# Patient Record
Sex: FEMALE | Race: BLACK OR AFRICAN AMERICAN | Employment: OTHER | URBAN - METROPOLITAN AREA
[De-identification: names, ages, dates, MRNs, and addresses within clinical notes are randomized per-mention and may not be internally consistent; named-entity substitution may affect disease eponyms.]

---

## 2018-09-06 ENCOUNTER — HOSPITAL ENCOUNTER (EMERGENCY)
Age: 68
Discharge: HOME | End: 2018-09-06
Attending: EMERGENCY MEDICINE
Payer: MEDICARE

## 2018-09-06 ENCOUNTER — APPOINTMENT (OUTPATIENT)
Dept: CT IMAGING | Age: 68
End: 2018-09-06
Payer: MEDICARE

## 2018-09-06 VITALS
BODY MASS INDEX: 30.3 KG/M2 | WEIGHT: 171 LBS | DIASTOLIC BLOOD PRESSURE: 92 MMHG | TEMPERATURE: 98.2 F | SYSTOLIC BLOOD PRESSURE: 195 MMHG | HEIGHT: 63 IN | HEART RATE: 104 BPM | OXYGEN SATURATION: 97 % | RESPIRATION RATE: 16 BRPM

## 2018-09-06 DIAGNOSIS — K52.9 GASTROENTERITIS: Primary | ICD-10-CM

## 2018-09-06 LAB
ALBUMIN SERPL-MCNC: 3.8 G/DL (ref 3.5–5.2)
ALP BLD-CCNC: 144 U/L (ref 35–104)
ALT SERPL-CCNC: 29 U/L (ref 0–32)
ANION GAP SERPL CALCULATED.3IONS-SCNC: 13 MMOL/L (ref 7–16)
AST SERPL-CCNC: 74 U/L (ref 0–31)
BASOPHILS ABSOLUTE: 0.02 E9/L (ref 0–0.2)
BASOPHILS RELATIVE PERCENT: 0.1 % (ref 0–2)
BILIRUB SERPL-MCNC: 1.1 MG/DL (ref 0–1.2)
BUN BLDV-MCNC: 12 MG/DL (ref 8–23)
CALCIUM SERPL-MCNC: 8.9 MG/DL (ref 8.6–10.2)
CHLORIDE BLD-SCNC: 96 MMOL/L (ref 98–107)
CO2: 27 MMOL/L (ref 22–29)
CREAT SERPL-MCNC: 0.7 MG/DL (ref 0.5–1)
EOSINOPHILS ABSOLUTE: 0.09 E9/L (ref 0.05–0.5)
EOSINOPHILS RELATIVE PERCENT: 0.6 % (ref 0–6)
GFR AFRICAN AMERICAN: >60
GFR NON-AFRICAN AMERICAN: >60 ML/MIN/1.73
GLUCOSE BLD-MCNC: 240 MG/DL (ref 74–109)
HCT VFR BLD CALC: 34.9 % (ref 34–48)
HEMOGLOBIN: 10.6 G/DL (ref 11.5–15.5)
IMMATURE GRANULOCYTES #: 0.06 E9/L
IMMATURE GRANULOCYTES %: 0.4 % (ref 0–5)
LACTIC ACID: 1.5 MMOL/L (ref 0.5–2.2)
LIPASE: 22 U/L (ref 13–60)
LYMPHOCYTES ABSOLUTE: 0.71 E9/L (ref 1.5–4)
LYMPHOCYTES RELATIVE PERCENT: 4.7 % (ref 20–42)
MCH RBC QN AUTO: 20.9 PG (ref 26–35)
MCHC RBC AUTO-ENTMCNC: 30.4 % (ref 32–34.5)
MCV RBC AUTO: 68.7 FL (ref 80–99.9)
MONOCYTES ABSOLUTE: 0.64 E9/L (ref 0.1–0.95)
MONOCYTES RELATIVE PERCENT: 4.3 % (ref 2–12)
NEUTROPHILS ABSOLUTE: 13.51 E9/L (ref 1.8–7.3)
NEUTROPHILS RELATIVE PERCENT: 89.9 % (ref 43–80)
PDW BLD-RTO: 19.7 FL (ref 11.5–15)
PLATELET # BLD: 239 E9/L (ref 130–450)
PMV BLD AUTO: 10.8 FL (ref 7–12)
POTASSIUM SERPL-SCNC: 3.9 MMOL/L (ref 3.5–5)
RBC # BLD: 5.08 E12/L (ref 3.5–5.5)
SODIUM BLD-SCNC: 136 MMOL/L (ref 132–146)
TOTAL PROTEIN: 7.6 G/DL (ref 6.4–8.3)
WBC # BLD: 15 E9/L (ref 4.5–11.5)

## 2018-09-06 PROCEDURE — 83690 ASSAY OF LIPASE: CPT

## 2018-09-06 PROCEDURE — 2580000003 HC RX 258: Performed by: EMERGENCY MEDICINE

## 2018-09-06 PROCEDURE — 74177 CT ABD & PELVIS W/CONTRAST: CPT

## 2018-09-06 PROCEDURE — 80053 COMPREHEN METABOLIC PANEL: CPT

## 2018-09-06 PROCEDURE — 85025 COMPLETE CBC W/AUTO DIFF WBC: CPT

## 2018-09-06 PROCEDURE — 99284 EMERGENCY DEPT VISIT MOD MDM: CPT

## 2018-09-06 PROCEDURE — 96374 THER/PROPH/DIAG INJ IV PUSH: CPT

## 2018-09-06 PROCEDURE — 83605 ASSAY OF LACTIC ACID: CPT

## 2018-09-06 PROCEDURE — 6360000004 HC RX CONTRAST MEDICATION: Performed by: RADIOLOGY

## 2018-09-06 PROCEDURE — 6360000002 HC RX W HCPCS: Performed by: EMERGENCY MEDICINE

## 2018-09-06 PROCEDURE — 36415 COLL VENOUS BLD VENIPUNCTURE: CPT

## 2018-09-06 RX ORDER — ONDANSETRON 2 MG/ML
4 INJECTION INTRAMUSCULAR; INTRAVENOUS ONCE
Status: COMPLETED | OUTPATIENT
Start: 2018-09-06 | End: 2018-09-06

## 2018-09-06 RX ORDER — 0.9 % SODIUM CHLORIDE 0.9 %
1000 INTRAVENOUS SOLUTION INTRAVENOUS ONCE
Status: COMPLETED | OUTPATIENT
Start: 2018-09-06 | End: 2018-09-06

## 2018-09-06 RX ORDER — PROMETHAZINE HYDROCHLORIDE 12.5 MG/1
12.5 TABLET ORAL EVERY 6 HOURS PRN
Qty: 15 TABLET | Refills: 0 | Status: SHIPPED | OUTPATIENT
Start: 2018-09-06 | End: 2018-09-13

## 2018-09-06 RX ADMIN — SODIUM CHLORIDE 1000 ML: 9 INJECTION, SOLUTION INTRAVENOUS at 18:30

## 2018-09-06 RX ADMIN — IOPAMIDOL 110 ML: 755 INJECTION, SOLUTION INTRAVENOUS at 20:05

## 2018-09-06 RX ADMIN — SODIUM CHLORIDE 1000 ML: 9 INJECTION, SOLUTION INTRAVENOUS at 18:55

## 2018-09-06 RX ADMIN — ONDANSETRON HYDROCHLORIDE 4 MG: 2 INJECTION, SOLUTION INTRAMUSCULAR; INTRAVENOUS at 18:55

## 2018-09-06 ASSESSMENT — PAIN DESCRIPTION - DESCRIPTORS: DESCRIPTORS: SORE

## 2018-09-06 ASSESSMENT — PAIN DESCRIPTION - FREQUENCY: FREQUENCY: CONTINUOUS

## 2018-09-06 ASSESSMENT — PAIN DESCRIPTION - ONSET: ONSET: SUDDEN

## 2018-09-06 ASSESSMENT — PAIN DESCRIPTION - LOCATION: LOCATION: ABDOMEN

## 2018-09-06 ASSESSMENT — PAIN SCALES - GENERAL: PAINLEVEL_OUTOF10: 7

## 2018-09-06 ASSESSMENT — PAIN DESCRIPTION - PAIN TYPE: TYPE: ACUTE PAIN

## 2018-09-06 NOTE — ED PROVIDER NOTES
Department of Emergency Medicine   ED  Provider Note  Admit Date/RoomTime: 9/6/2018  5:56 PM  ED Room: 04/04    HPI:   Dhruv Valladares is a 77 y.o. female presenting to the ED for abd pain, beginning at 7:00 AM today. The complaint has been constant, moderate in severity, and worsened by nothing. Pt also c/o nausea and emesis. She states she had diarrhea 2 days ago but it has since resolved. She has had 7 episodes of emesis throughout the day. She has taken imodium. She denies HA, chest pain, SOB, fever, chills, diarrhea, dysuria, hematuria, hematochezia, and melena. ROS:   Pertinent positives and negatives are stated within HPI, all other systems reviewed and are negative.    --------------------------------------------- PAST HISTORY ---------------------------------------------  Past Medical History:  has a past medical history of Diabetes mellitus ([facility] [address] Diverticular disease; Fibromyalgia muscle pain; and Hypertension. Past Surgical History:  has a past surgical history that includes Hysterectomy. Social History:  reports that she has never smoked. She has never used smokeless tobacco. She reports that she does not drink alcohol or use drugs. Family History: family history is not on file. The patients home medications have been reviewed. Allergies: Patient has no known allergies.     -------------------------------------------------- RESULTS -------------------------------------------------  All laboratory and radiology results have been personally reviewed by myself   LABS:  Results for orders placed or performed during the hospital encounter of 09/06/18   CBC Auto Differential   Result Value Ref Range    WBC 15.0 (H) 4.5 - 11.5 E9/L    RBC 5.08 3.50 - 5.50 E12/L    Hemoglobin 10.6 (L) 11.5 - 15.5 g/dL    Hematocrit 34.9 34.0 - 48.0 %    MCV 68.7 (L) 80.0 - 99.9 fL    MCH 20.9 (L) 26.0 - 35.0 pg    MCHC 30.4 (L) 32.0 - 34.5 %    RDW 19.7 (H) 11.5 - 15.0 fL    Platelets 174 291 - 749 E9/L    MPV 10.8 7.0 - 12.0 fL    Neutrophils % 89.9 (H) 43.0 - 80.0 %    Immature Granulocytes % 0.4 0.0 - 5.0 %    Lymphocytes % 4.7 (L) 20.0 - 42.0 %    Monocytes % 4.3 2.0 - 12.0 %    Eosinophils % 0.6 0.0 - 6.0 %    Basophils % 0.1 0.0 - 2.0 %    Neutrophils # 13.51 (H) 1.80 - 7.30 E9/L    Immature Granulocytes # 0.06 E9/L    Lymphocytes # 0.71 (L) 1.50 - 4.00 E9/L    Monocytes # 0.64 0.10 - 0.95 E9/L    Eosinophils # 0.09 0.05 - 0.50 E9/L    Basophils # 0.02 0.00 - 0.20 E9/L   Comprehensive Metabolic Panel   Result Value Ref Range    Sodium 136 132 - 146 mmol/L    Potassium 3.9 3.5 - 5.0 mmol/L    Chloride 96 (L) 98 - 107 mmol/L    CO2 27 22 - 29 mmol/L    Anion Gap 13 7 - 16 mmol/L    Glucose 240 (H) 74 - 109 mg/dL    BUN 12 8 - 23 mg/dL    CREATININE 0.7 0.5 - 1.0 mg/dL    GFR Non-African American >60 >=60 mL/min/1.73    GFR African American >60     Calcium 8.9 8.6 - 10.2 mg/dL    Total Protein 7.6 6.4 - 8.3 g/dL    Alb 3.8 3.5 - 5.2 g/dL    Total Bilirubin 1.1 0.0 - 1.2 mg/dL    Alkaline Phosphatase 144 (H) 35 - 104 U/L    ALT 29 0 - 32 U/L    AST 74 (H) 0 - 31 U/L   Lactic Acid, Plasma   Result Value Ref Range    Lactic Acid 1.5 0.5 - 2.2 mmol/L   Lipase   Result Value Ref Range    Lipase 22 13 - 60 U/L       RADIOLOGY:  Interpreted by Radiologist.  CT ABDOMEN PELVIS W IV CONTRAST Additional Contrast? None   Final Result      1. Cholelithiasis. 2. Small hiatal hernia.                         ------------------------- NURSING NOTES AND VITALS REVIEWED ---------------------------   The nursing notes within the ED encounter and vital signs as below have been reviewed.    BP (!) 179/94   Pulse 104   Temp 99.5 °F (37.5 °C) (Oral)   Resp 16   Ht 5' 3\" (1.6 m)   Wt 171 lb (77.6 kg)   SpO2 98%   BMI 30.29 kg/m²   Oxygen Saturation Interpretation: Normal    ---------------------------------------------------PHYSICAL EXAM--------------------------------------    Constitutional/General: Alert and oriented x3, well appearing, non toxic in NAD. Afebrile. Head: NC/AT  Eyes: PERRL, EOMI  HENT: Oropharynx clear. Handling secretions. Neck: Supple, full ROM  Pulmonary: Lungs clear to auscultation bilaterally, no wheezes, rales, or rhonchi. Not in respiratory distress. Cardiovascular: Regular rate. Regular rhythm. No murmurs. No gallops, or rubs. 2+ distal pulses. Abdomen: Soft, non tender, non distended. No guarding, rebound, or rigidity. Extremities: Moves all extremities x 4. Warm and well perfused. No edema. Skin: Warm and dry without rash. Neurologic: GCS 15, no focal deficits, systemic strength 5/5 to all extremities symmetrically, CN II-XII grossly intact. Psych: Speech and behavior appropriate.       ------------------------------ ED COURSE/MEDICAL DECISION MAKING----------------------  Medications   0.9 % sodium chloride bolus (1,000 mLs Intravenous New Bag 9/6/18 1855)   0.9 % sodium chloride bolus (1,000 mLs Intravenous New Bag 9/6/18 1830)   ondansetron (ZOFRAN) injection 4 mg (4 mg Intravenous Given 9/6/18 1855)   iopamidol (ISOVUE-370) 76 % injection 110 mL (110 mLs Intravenous Given 9/6/18 2005)            Medical Decision Making:    Pt is a 77year old female presenting to the ED for abd pain, nausea, and emesis. She appears in NAD, her BP is slightly elevated. She will be given IV fluids. Labs ordered. CT scan of the abdomen was negative except for a symptomatic cholelithiasis  Counseling: The emergency provider has spoken with the patient and discussed todays results, in addition to providing specific details for the plan of care and counseling regarding the diagnosis and prognosis. Questions are answered at this time and they are agreeable with the plan.      --------------------------------- IMPRESSION AND DISPOSITION ---------------------------------    IMPRESSION  1.  Gastroenteritis        DISPOSITION  Disposition: Discharge to home  Patient condition is good    9/6/18, 6:20 PM.    This note is

## 2018-11-24 ENCOUNTER — HOSPITAL ENCOUNTER (EMERGENCY)
Age: 68
Discharge: HOME | End: 2018-11-24
Attending: EMERGENCY MEDICINE
Payer: MEDICARE

## 2018-11-24 ENCOUNTER — APPOINTMENT (OUTPATIENT)
Dept: GENERAL RADIOLOGY | Age: 68
End: 2018-11-24
Payer: MEDICARE

## 2018-11-24 VITALS
HEART RATE: 84 BPM | HEIGHT: 64 IN | TEMPERATURE: 98 F | WEIGHT: 174 LBS | DIASTOLIC BLOOD PRESSURE: 81 MMHG | SYSTOLIC BLOOD PRESSURE: 175 MMHG | OXYGEN SATURATION: 99 % | BODY MASS INDEX: 29.71 KG/M2 | RESPIRATION RATE: 16 BRPM

## 2018-11-24 DIAGNOSIS — R07.82 INTERCOSTAL PAIN: ICD-10-CM

## 2018-11-24 DIAGNOSIS — N64.4 BREAST PAIN: Primary | ICD-10-CM

## 2018-11-24 LAB
ALBUMIN SERPL-MCNC: 3.7 G/DL (ref 3.5–5.2)
ALP BLD-CCNC: 143 U/L (ref 35–104)
ALT SERPL-CCNC: 16 U/L (ref 0–32)
ANION GAP SERPL CALCULATED.3IONS-SCNC: 11 MMOL/L (ref 7–16)
AST SERPL-CCNC: 25 U/L (ref 0–31)
BACTERIA: ABNORMAL /HPF
BASOPHILS ABSOLUTE: 0.02 E9/L (ref 0–0.2)
BASOPHILS RELATIVE PERCENT: 0.3 % (ref 0–2)
BILIRUB SERPL-MCNC: 0.3 MG/DL (ref 0–1.2)
BILIRUBIN URINE: NEGATIVE
BLOOD, URINE: ABNORMAL
BUN BLDV-MCNC: 20 MG/DL (ref 8–23)
CALCIUM SERPL-MCNC: 8.6 MG/DL (ref 8.6–10.2)
CHLORIDE BLD-SCNC: 99 MMOL/L (ref 98–107)
CLARITY: CLEAR
CO2: 24 MMOL/L (ref 22–29)
COLOR: YELLOW
CREAT SERPL-MCNC: 0.8 MG/DL (ref 0.5–1)
EKG ATRIAL RATE: 92 BPM
EKG P AXIS: 46 DEGREES
EKG P-R INTERVAL: 150 MS
EKG Q-T INTERVAL: 380 MS
EKG QRS DURATION: 90 MS
EKG QTC CALCULATION (BAZETT): 469 MS
EKG R AXIS: 7 DEGREES
EKG T AXIS: 62 DEGREES
EKG VENTRICULAR RATE: 92 BPM
EOSINOPHILS ABSOLUTE: 0.29 E9/L (ref 0.05–0.5)
EOSINOPHILS RELATIVE PERCENT: 4.5 % (ref 0–6)
GFR AFRICAN AMERICAN: >60
GFR NON-AFRICAN AMERICAN: >60 ML/MIN/1.73
GLUCOSE BLD-MCNC: 328 MG/DL (ref 74–99)
GLUCOSE URINE: >=1000 MG/DL
HCT VFR BLD CALC: 33 % (ref 34–48)
HEMOGLOBIN: 10.5 G/DL (ref 11.5–15.5)
IMMATURE GRANULOCYTES #: 0.02 E9/L
IMMATURE GRANULOCYTES %: 0.3 % (ref 0–5)
KETONES, URINE: NEGATIVE MG/DL
LACTIC ACID: 1.4 MMOL/L (ref 0.5–2.2)
LEUKOCYTE ESTERASE, URINE: ABNORMAL
LIPASE: 36 U/L (ref 13–60)
LYMPHOCYTES ABSOLUTE: 1.91 E9/L (ref 1.5–4)
LYMPHOCYTES RELATIVE PERCENT: 29.4 % (ref 20–42)
MCH RBC QN AUTO: 23.2 PG (ref 26–35)
MCHC RBC AUTO-ENTMCNC: 31.8 % (ref 32–34.5)
MCV RBC AUTO: 72.8 FL (ref 80–99.9)
MONOCYTES ABSOLUTE: 0.5 E9/L (ref 0.1–0.95)
MONOCYTES RELATIVE PERCENT: 7.7 % (ref 2–12)
NEUTROPHILS ABSOLUTE: 3.76 E9/L (ref 1.8–7.3)
NEUTROPHILS RELATIVE PERCENT: 57.8 % (ref 43–80)
NITRITE, URINE: NEGATIVE
PDW BLD-RTO: 19.6 FL (ref 11.5–15)
PH UA: 6 (ref 5–9)
PLATELET # BLD: 209 E9/L (ref 130–450)
PMV BLD AUTO: 11.1 FL (ref 7–12)
POTASSIUM SERPL-SCNC: 4.6 MMOL/L (ref 3.5–5)
PRO-BNP: 79 PG/ML (ref 0–125)
PROTEIN UA: NEGATIVE MG/DL
RBC # BLD: 4.53 E12/L (ref 3.5–5.5)
RBC UA: ABNORMAL /HPF (ref 0–2)
SODIUM BLD-SCNC: 134 MMOL/L (ref 132–146)
SPECIFIC GRAVITY UA: 1.01 (ref 1–1.03)
TOTAL PROTEIN: 7.1 G/DL (ref 6.4–8.3)
TROPONIN: <0.01 NG/ML (ref 0–0.03)
UROBILINOGEN, URINE: 0.2 E.U./DL
WBC # BLD: 6.5 E9/L (ref 4.5–11.5)
WBC UA: ABNORMAL /HPF (ref 0–5)

## 2018-11-24 PROCEDURE — 83605 ASSAY OF LACTIC ACID: CPT

## 2018-11-24 PROCEDURE — 81001 URINALYSIS AUTO W/SCOPE: CPT

## 2018-11-24 PROCEDURE — 71045 X-RAY EXAM CHEST 1 VIEW: CPT

## 2018-11-24 PROCEDURE — 83880 ASSAY OF NATRIURETIC PEPTIDE: CPT

## 2018-11-24 PROCEDURE — 6360000002 HC RX W HCPCS: Performed by: EMERGENCY MEDICINE

## 2018-11-24 PROCEDURE — 83690 ASSAY OF LIPASE: CPT

## 2018-11-24 PROCEDURE — 96374 THER/PROPH/DIAG INJ IV PUSH: CPT

## 2018-11-24 PROCEDURE — 99285 EMERGENCY DEPT VISIT HI MDM: CPT

## 2018-11-24 PROCEDURE — 36415 COLL VENOUS BLD VENIPUNCTURE: CPT

## 2018-11-24 PROCEDURE — 96375 TX/PRO/DX INJ NEW DRUG ADDON: CPT

## 2018-11-24 PROCEDURE — 80053 COMPREHEN METABOLIC PANEL: CPT

## 2018-11-24 PROCEDURE — 84484 ASSAY OF TROPONIN QUANT: CPT

## 2018-11-24 PROCEDURE — 85025 COMPLETE CBC W/AUTO DIFF WBC: CPT

## 2018-11-24 RX ORDER — HYDRALAZINE HYDROCHLORIDE 20 MG/ML
10 INJECTION INTRAMUSCULAR; INTRAVENOUS ONCE
Status: COMPLETED | OUTPATIENT
Start: 2018-11-24 | End: 2018-11-24

## 2018-11-24 RX ORDER — NAPROXEN 500 MG/1
500 TABLET ORAL 2 TIMES DAILY PRN
Qty: 28 TABLET | Refills: 0 | Status: SHIPPED | OUTPATIENT
Start: 2018-11-24 | End: 2019-09-19

## 2018-11-24 RX ORDER — SODIUM CHLORIDE 0.9 % (FLUSH) 0.9 %
10 SYRINGE (ML) INJECTION PRN
Status: DISCONTINUED | OUTPATIENT
Start: 2018-11-24 | End: 2018-11-24 | Stop reason: HOSPADM

## 2018-11-24 RX ORDER — FENTANYL CITRATE 50 UG/ML
25 INJECTION, SOLUTION INTRAMUSCULAR; INTRAVENOUS ONCE
Status: COMPLETED | OUTPATIENT
Start: 2018-11-24 | End: 2018-11-24

## 2018-11-24 RX ADMIN — FENTANYL CITRATE 25 MCG: 50 INJECTION, SOLUTION INTRAMUSCULAR; INTRAVENOUS at 10:43

## 2018-11-24 RX ADMIN — HYDRALAZINE HYDROCHLORIDE 10 MG: 20 INJECTION INTRAMUSCULAR; INTRAVENOUS at 12:11

## 2018-11-24 ASSESSMENT — PAIN DESCRIPTION - DESCRIPTORS: DESCRIPTORS: SORE

## 2018-11-24 ASSESSMENT — ENCOUNTER SYMPTOMS
NAUSEA: 0
VOMITING: 0
BACK PAIN: 0
ABDOMINAL PAIN: 0
COUGH: 0
BLOOD IN STOOL: 0
SHORTNESS OF BREATH: 0

## 2018-11-24 ASSESSMENT — PAIN DESCRIPTION - ORIENTATION: ORIENTATION: MID

## 2018-11-24 ASSESSMENT — PAIN SCALES - GENERAL
PAINLEVEL_OUTOF10: 7
PAINLEVEL_OUTOF10: 3

## 2018-11-24 ASSESSMENT — PAIN DESCRIPTION - LOCATION
LOCATION: CHEST
LOCATION: CHEST

## 2018-11-24 ASSESSMENT — PAIN DESCRIPTION - PAIN TYPE: TYPE: ACUTE PAIN

## 2018-11-24 ASSESSMENT — PAIN DESCRIPTION - FREQUENCY: FREQUENCY: CONTINUOUS

## 2019-03-19 ENCOUNTER — APPOINTMENT (OUTPATIENT)
Dept: GENERAL RADIOLOGY | Age: 69
End: 2019-03-19
Payer: MEDICARE

## 2019-03-19 ENCOUNTER — HOSPITAL ENCOUNTER (EMERGENCY)
Age: 69
Discharge: HOME | End: 2019-03-19
Attending: EMERGENCY MEDICINE
Payer: MEDICARE

## 2019-03-19 VITALS
BODY MASS INDEX: 28.35 KG/M2 | WEIGHT: 160 LBS | RESPIRATION RATE: 20 BRPM | OXYGEN SATURATION: 94 % | TEMPERATURE: 99 F | HEIGHT: 63 IN | HEART RATE: 88 BPM | DIASTOLIC BLOOD PRESSURE: 88 MMHG | SYSTOLIC BLOOD PRESSURE: 148 MMHG

## 2019-03-19 DIAGNOSIS — J11.1 INFLUENZA WITH RESPIRATORY MANIFESTATION OTHER THAN PNEUMONIA: Primary | ICD-10-CM

## 2019-03-19 LAB
ANION GAP SERPL CALCULATED.3IONS-SCNC: 12 MMOL/L (ref 7–16)
ANISOCYTOSIS: ABNORMAL
BASOPHILS ABSOLUTE: 0.01 E9/L (ref 0–0.2)
BASOPHILS RELATIVE PERCENT: 0.1 % (ref 0–2)
BUN BLDV-MCNC: 18 MG/DL (ref 8–23)
BURR CELLS: ABNORMAL
CALCIUM SERPL-MCNC: 8.6 MG/DL (ref 8.6–10.2)
CHLORIDE BLD-SCNC: 94 MMOL/L (ref 98–107)
CO2: 27 MMOL/L (ref 22–29)
CREAT SERPL-MCNC: 1 MG/DL (ref 0.5–1)
EOSINOPHILS ABSOLUTE: 0.01 E9/L (ref 0.05–0.5)
EOSINOPHILS RELATIVE PERCENT: 0.1 % (ref 0–6)
GFR AFRICAN AMERICAN: >60
GFR NON-AFRICAN AMERICAN: >60 ML/MIN/1.73
GLUCOSE BLD-MCNC: 187 MG/DL (ref 74–99)
HCT VFR BLD CALC: 30.4 % (ref 34–48)
HEMOGLOBIN: 9 G/DL (ref 11.5–15.5)
HYPOCHROMIA: ABNORMAL
IMMATURE GRANULOCYTES #: 0.05 E9/L
IMMATURE GRANULOCYTES %: 0.5 % (ref 0–5)
INFLUENZA A BY PCR: DETECTED
INFLUENZA B BY PCR: NOT DETECTED
LACTIC ACID: 1.3 MMOL/L (ref 0.5–2.2)
LYMPHOCYTES ABSOLUTE: 1.07 E9/L (ref 1.5–4)
LYMPHOCYTES RELATIVE PERCENT: 11 % (ref 20–42)
MCH RBC QN AUTO: 18.2 PG (ref 26–35)
MCHC RBC AUTO-ENTMCNC: 29.6 % (ref 32–34.5)
MCV RBC AUTO: 61.4 FL (ref 80–99.9)
MONOCYTES ABSOLUTE: 0.98 E9/L (ref 0.1–0.95)
MONOCYTES RELATIVE PERCENT: 10.1 % (ref 2–12)
NEUTROPHILS ABSOLUTE: 7.6 E9/L (ref 1.8–7.3)
NEUTROPHILS RELATIVE PERCENT: 78.2 % (ref 43–80)
OVALOCYTES: ABNORMAL
PDW BLD-RTO: 21.1 FL (ref 11.5–15)
PLATELET # BLD: 255 E9/L (ref 130–450)
PMV BLD AUTO: ABNORMAL FL (ref 7–12)
POIKILOCYTES: ABNORMAL
POLYCHROMASIA: ABNORMAL
POTASSIUM SERPL-SCNC: 3.4 MMOL/L (ref 3.5–5)
RBC # BLD: 4.95 E12/L (ref 3.5–5.5)
SCHISTOCYTES: ABNORMAL
SODIUM BLD-SCNC: 133 MMOL/L (ref 132–146)
WBC # BLD: 9.7 E9/L (ref 4.5–11.5)

## 2019-03-19 PROCEDURE — 99283 EMERGENCY DEPT VISIT LOW MDM: CPT

## 2019-03-19 PROCEDURE — 6370000000 HC RX 637 (ALT 250 FOR IP): Performed by: EMERGENCY MEDICINE

## 2019-03-19 PROCEDURE — 80048 BASIC METABOLIC PNL TOTAL CA: CPT

## 2019-03-19 PROCEDURE — 71046 X-RAY EXAM CHEST 2 VIEWS: CPT

## 2019-03-19 PROCEDURE — 87040 BLOOD CULTURE FOR BACTERIA: CPT

## 2019-03-19 PROCEDURE — 85025 COMPLETE CBC W/AUTO DIFF WBC: CPT

## 2019-03-19 PROCEDURE — 83605 ASSAY OF LACTIC ACID: CPT

## 2019-03-19 PROCEDURE — 87502 INFLUENZA DNA AMP PROBE: CPT

## 2019-03-19 PROCEDURE — 36415 COLL VENOUS BLD VENIPUNCTURE: CPT

## 2019-03-19 PROCEDURE — 2580000003 HC RX 258: Performed by: EMERGENCY MEDICINE

## 2019-03-19 RX ORDER — BENZONATATE 100 MG/1
100 CAPSULE ORAL 3 TIMES DAILY PRN
Qty: 21 CAPSULE | Refills: 0 | Status: SHIPPED | OUTPATIENT
Start: 2019-03-19 | End: 2019-03-26

## 2019-03-19 RX ORDER — ACETAMINOPHEN 325 MG/1
650 TABLET ORAL ONCE
Status: COMPLETED | OUTPATIENT
Start: 2019-03-19 | End: 2019-03-19

## 2019-03-19 RX ORDER — 0.9 % SODIUM CHLORIDE 0.9 %
500 INTRAVENOUS SOLUTION INTRAVENOUS ONCE
Status: COMPLETED | OUTPATIENT
Start: 2019-03-19 | End: 2019-03-19

## 2019-03-19 RX ORDER — OSELTAMIVIR PHOSPHATE 75 MG/1
75 CAPSULE ORAL ONCE
Status: COMPLETED | OUTPATIENT
Start: 2019-03-19 | End: 2019-03-19

## 2019-03-19 RX ORDER — OSELTAMIVIR PHOSPHATE 75 MG/1
75 CAPSULE ORAL 2 TIMES DAILY
Qty: 10 CAPSULE | Refills: 0 | Status: SHIPPED | OUTPATIENT
Start: 2019-03-19 | End: 2019-03-24

## 2019-03-19 RX ORDER — POTASSIUM CHLORIDE 20 MEQ/1
20 TABLET, EXTENDED RELEASE ORAL ONCE
Status: COMPLETED | OUTPATIENT
Start: 2019-03-19 | End: 2019-03-19

## 2019-03-19 RX ADMIN — ACETAMINOPHEN 650 MG: 325 TABLET ORAL at 18:07

## 2019-03-19 RX ADMIN — OSELTAMIVIR PHOSPHATE 75 MG: 75 CAPSULE ORAL at 20:15

## 2019-03-19 RX ADMIN — POTASSIUM CHLORIDE 20 MEQ: 20 TABLET, EXTENDED RELEASE ORAL at 20:58

## 2019-03-19 RX ADMIN — SODIUM CHLORIDE 500 ML: 9 INJECTION, SOLUTION INTRAVENOUS at 17:57

## 2019-03-19 ASSESSMENT — ENCOUNTER SYMPTOMS
BLOOD IN STOOL: 0
COUGH: 1
SHORTNESS OF BREATH: 0
VOMITING: 0
NAUSEA: 0
ABDOMINAL PAIN: 0
BACK PAIN: 0

## 2019-03-19 ASSESSMENT — PAIN DESCRIPTION - PAIN TYPE: TYPE: ACUTE PAIN

## 2019-03-19 ASSESSMENT — PAIN SCALES - GENERAL
PAINLEVEL_OUTOF10: 8
PAINLEVEL_OUTOF10: 8

## 2019-03-19 ASSESSMENT — PAIN DESCRIPTION - LOCATION: LOCATION: HEAD;FOOT;LEG

## 2019-03-19 ASSESSMENT — PAIN DESCRIPTION - DESCRIPTORS: DESCRIPTORS: ACHING

## 2019-03-19 ASSESSMENT — PAIN DESCRIPTION - PROGRESSION: CLINICAL_PROGRESSION: RESOLVED

## 2019-03-19 ASSESSMENT — PAIN DESCRIPTION - ORIENTATION: ORIENTATION: RIGHT;LEFT

## 2019-03-19 ASSESSMENT — PAIN SCALES - WONG BAKER: WONGBAKER_NUMERICALRESPONSE: 2

## 2019-03-24 LAB
BLOOD CULTURE, ROUTINE: NORMAL
CULTURE, BLOOD 2: NORMAL

## 2019-03-29 LAB
EKG ATRIAL RATE: 98 BPM
EKG P AXIS: 47 DEGREES
EKG P-R INTERVAL: 134 MS
EKG Q-T INTERVAL: 388 MS
EKG QRS DURATION: 98 MS
EKG QTC CALCULATION (BAZETT): 495 MS
EKG R AXIS: 37 DEGREES
EKG T AXIS: 105 DEGREES
EKG VENTRICULAR RATE: 98 BPM

## 2019-09-19 ENCOUNTER — APPOINTMENT (OUTPATIENT)
Dept: GENERAL RADIOLOGY | Age: 69
DRG: 193 | End: 2019-09-19
Payer: MEDICARE

## 2019-09-19 ENCOUNTER — APPOINTMENT (OUTPATIENT)
Dept: CT IMAGING | Age: 69
DRG: 193 | End: 2019-09-19
Payer: MEDICARE

## 2019-09-19 ENCOUNTER — APPOINTMENT (OUTPATIENT)
Dept: ULTRASOUND IMAGING | Age: 69
DRG: 193 | End: 2019-09-19
Payer: MEDICARE

## 2019-09-19 ENCOUNTER — HOSPITAL ENCOUNTER (INPATIENT)
Age: 69
LOS: 1 days | Discharge: HOME | DRG: 193 | End: 2019-09-20
Attending: EMERGENCY MEDICINE | Admitting: INTERNAL MEDICINE
Payer: MEDICARE

## 2019-09-19 DIAGNOSIS — J96.01 ACUTE RESPIRATORY FAILURE WITH HYPOXIA (HCC): Primary | ICD-10-CM

## 2019-09-19 DIAGNOSIS — J18.9 PNEUMONIA OF RIGHT UPPER LOBE DUE TO INFECTIOUS ORGANISM: ICD-10-CM

## 2019-09-19 DIAGNOSIS — J44.1 COPD EXACERBATION (HCC): ICD-10-CM

## 2019-09-19 PROBLEM — M54.9 CHRONIC BACK PAIN: Status: ACTIVE | Noted: 2019-09-19

## 2019-09-19 PROBLEM — G89.29 CHRONIC BACK PAIN: Status: ACTIVE | Noted: 2019-09-19

## 2019-09-19 LAB
ABO/RH: NORMAL
ANION GAP SERPL CALCULATED.3IONS-SCNC: 10 MMOL/L (ref 7–16)
ANTIBODY SCREEN: NORMAL
B.E.: -0.3 MMOL/L (ref -3–3)
BASOPHILS ABSOLUTE: 0.03 E9/L (ref 0–0.2)
BASOPHILS RELATIVE PERCENT: 0.3 % (ref 0–2)
BUN BLDV-MCNC: 18 MG/DL (ref 8–23)
CALCIUM SERPL-MCNC: 9.2 MG/DL (ref 8.6–10.2)
CHLORIDE BLD-SCNC: 98 MMOL/L (ref 98–107)
CO2: 28 MMOL/L (ref 22–29)
COHB: 1.1 % (ref 0–1.5)
CREAT SERPL-MCNC: 0.9 MG/DL (ref 0.5–1)
CRITICAL: ABNORMAL
DATE ANALYZED: ABNORMAL
DATE OF COLLECTION: ABNORMAL
EOSINOPHILS ABSOLUTE: 0.19 E9/L (ref 0.05–0.5)
EOSINOPHILS RELATIVE PERCENT: 2.1 % (ref 0–6)
GFR AFRICAN AMERICAN: >60
GFR NON-AFRICAN AMERICAN: >60 ML/MIN/1.73
GLUCOSE BLD-MCNC: 265 MG/DL (ref 74–99)
HCO3: 24.7 MMOL/L (ref 22–26)
HCT VFR BLD CALC: 39.9 % (ref 34–48)
HEMOGLOBIN: 12.4 G/DL (ref 11.5–15.5)
HHB: 9.9 % (ref 0–5)
IMMATURE GRANULOCYTES #: 0.03 E9/L
IMMATURE GRANULOCYTES %: 0.3 % (ref 0–5)
LAB: ABNORMAL
LYMPHOCYTES ABSOLUTE: 1.21 E9/L (ref 1.5–4)
LYMPHOCYTES RELATIVE PERCENT: 13.2 % (ref 20–42)
Lab: ABNORMAL
MCH RBC QN AUTO: 25.9 PG (ref 26–35)
MCHC RBC AUTO-ENTMCNC: 31.1 % (ref 32–34.5)
MCV RBC AUTO: 83.3 FL (ref 80–99.9)
METER GLUCOSE: 285 MG/DL (ref 74–99)
METER GLUCOSE: 296 MG/DL (ref 74–99)
METER GLUCOSE: 350 MG/DL (ref 74–99)
METHB: 0.2 % (ref 0–1.5)
MODE: ABNORMAL
MONOCYTES ABSOLUTE: 0.59 E9/L (ref 0.1–0.95)
MONOCYTES RELATIVE PERCENT: 6.5 % (ref 2–12)
NEUTROPHILS ABSOLUTE: 7.09 E9/L (ref 1.8–7.3)
NEUTROPHILS RELATIVE PERCENT: 77.6 % (ref 43–80)
O2 CONTENT: 16.3 ML/DL
O2 SATURATION: 90 % (ref 92–98.5)
O2HB: 88.8 % (ref 94–97)
OPERATOR ID: ABNORMAL
PATIENT TEMP: 37 C
PCO2: 41.8 MMHG (ref 35–45)
PDW BLD-RTO: 15 FL (ref 11.5–15)
PH BLOOD GAS: 7.39 (ref 7.35–7.45)
PLATELET # BLD: 160 E9/L (ref 130–450)
PMV BLD AUTO: 11.9 FL (ref 7–12)
PO2: 57 MMHG (ref 60–100)
POTASSIUM SERPL-SCNC: 4.1 MMOL/L (ref 3.5–5)
PRO-BNP: 3189 PG/ML (ref 0–125)
RBC # BLD: 4.79 E12/L (ref 3.5–5.5)
SODIUM BLD-SCNC: 136 MMOL/L (ref 132–146)
SOURCE, BLOOD GAS: ABNORMAL
THB: 13.1 G/DL (ref 11.5–16.5)
TIME ANALYZED: 814
TROPONIN: <0.01 NG/ML (ref 0–0.03)
WBC # BLD: 9.1 E9/L (ref 4.5–11.5)

## 2019-09-19 PROCEDURE — 82962 GLUCOSE BLOOD TEST: CPT

## 2019-09-19 PROCEDURE — 93005 ELECTROCARDIOGRAM TRACING: CPT | Performed by: EMERGENCY MEDICINE

## 2019-09-19 PROCEDURE — 93971 EXTREMITY STUDY: CPT

## 2019-09-19 PROCEDURE — 82805 BLOOD GASES W/O2 SATURATION: CPT

## 2019-09-19 PROCEDURE — 6360000002 HC RX W HCPCS: Performed by: NURSE PRACTITIONER

## 2019-09-19 PROCEDURE — 94640 AIRWAY INHALATION TREATMENT: CPT

## 2019-09-19 PROCEDURE — 6370000000 HC RX 637 (ALT 250 FOR IP): Performed by: NURSE PRACTITIONER

## 2019-09-19 PROCEDURE — 85025 COMPLETE CBC W/AUTO DIFF WBC: CPT

## 2019-09-19 PROCEDURE — APPSS45 APP SPLIT SHARED TIME 31-45 MINUTES: Performed by: NURSE PRACTITIONER

## 2019-09-19 PROCEDURE — 6360000002 HC RX W HCPCS: Performed by: EMERGENCY MEDICINE

## 2019-09-19 PROCEDURE — 99285 EMERGENCY DEPT VISIT HI MDM: CPT

## 2019-09-19 PROCEDURE — 6360000004 HC RX CONTRAST MEDICATION: Performed by: RADIOLOGY

## 2019-09-19 PROCEDURE — 71275 CT ANGIOGRAPHY CHEST: CPT

## 2019-09-19 PROCEDURE — 80048 BASIC METABOLIC PNL TOTAL CA: CPT

## 2019-09-19 PROCEDURE — 87633 RESP VIRUS 12-25 TARGETS: CPT

## 2019-09-19 PROCEDURE — 83880 ASSAY OF NATRIURETIC PEPTIDE: CPT

## 2019-09-19 PROCEDURE — 99223 1ST HOSP IP/OBS HIGH 75: CPT | Performed by: INTERNAL MEDICINE

## 2019-09-19 PROCEDURE — 86900 BLOOD TYPING SEROLOGIC ABO: CPT

## 2019-09-19 PROCEDURE — 2580000003 HC RX 258: Performed by: EMERGENCY MEDICINE

## 2019-09-19 PROCEDURE — 94760 N-INVAS EAR/PLS OXIMETRY 1: CPT

## 2019-09-19 PROCEDURE — 96365 THER/PROPH/DIAG IV INF INIT: CPT

## 2019-09-19 PROCEDURE — 2580000003 HC RX 258: Performed by: NURSE PRACTITIONER

## 2019-09-19 PROCEDURE — 87798 DETECT AGENT NOS DNA AMP: CPT

## 2019-09-19 PROCEDURE — 87581 M.PNEUMON DNA AMP PROBE: CPT

## 2019-09-19 PROCEDURE — 86901 BLOOD TYPING SEROLOGIC RH(D): CPT

## 2019-09-19 PROCEDURE — 2060000000 HC ICU INTERMEDIATE R&B

## 2019-09-19 PROCEDURE — 87486 CHLMYD PNEUM DNA AMP PROBE: CPT

## 2019-09-19 PROCEDURE — 84484 ASSAY OF TROPONIN QUANT: CPT

## 2019-09-19 PROCEDURE — 96375 TX/PRO/DX INJ NEW DRUG ADDON: CPT

## 2019-09-19 PROCEDURE — 94664 DEMO&/EVAL PT USE INHALER: CPT

## 2019-09-19 PROCEDURE — 86850 RBC ANTIBODY SCREEN: CPT

## 2019-09-19 PROCEDURE — 6370000000 HC RX 637 (ALT 250 FOR IP): Performed by: EMERGENCY MEDICINE

## 2019-09-19 PROCEDURE — 71045 X-RAY EXAM CHEST 1 VIEW: CPT

## 2019-09-19 RX ORDER — CLONIDINE HYDROCHLORIDE 0.2 MG/1
0.2 TABLET ORAL NIGHTLY
Status: DISCONTINUED | OUTPATIENT
Start: 2019-09-19 | End: 2019-09-20 | Stop reason: HOSPADM

## 2019-09-19 RX ORDER — SODIUM CHLORIDE 0.9 % (FLUSH) 0.9 %
10 SYRINGE (ML) INJECTION PRN
Status: DISCONTINUED | OUTPATIENT
Start: 2019-09-19 | End: 2019-09-19 | Stop reason: SDUPTHER

## 2019-09-19 RX ORDER — IPRATROPIUM BROMIDE AND ALBUTEROL SULFATE 2.5; .5 MG/3ML; MG/3ML
1 SOLUTION RESPIRATORY (INHALATION)
Status: DISCONTINUED | OUTPATIENT
Start: 2019-09-19 | End: 2019-09-20 | Stop reason: HOSPADM

## 2019-09-19 RX ORDER — SODIUM CHLORIDE 0.9 % (FLUSH) 0.9 %
10 SYRINGE (ML) INJECTION EVERY 12 HOURS SCHEDULED
Status: DISCONTINUED | OUTPATIENT
Start: 2019-09-19 | End: 2019-09-20 | Stop reason: HOSPADM

## 2019-09-19 RX ORDER — OXYCODONE HYDROCHLORIDE AND ACETAMINOPHEN 5; 325 MG/1; MG/1
1 TABLET ORAL EVERY 4 HOURS PRN
Status: DISCONTINUED | OUTPATIENT
Start: 2019-09-19 | End: 2019-09-20 | Stop reason: HOSPADM

## 2019-09-19 RX ORDER — CYCLOBENZAPRINE HCL 10 MG
10 TABLET ORAL 3 TIMES DAILY PRN
Status: DISCONTINUED | OUTPATIENT
Start: 2019-09-19 | End: 2019-09-20 | Stop reason: HOSPADM

## 2019-09-19 RX ORDER — LORAZEPAM 2 MG/ML
0.5 INJECTION INTRAMUSCULAR ONCE
Status: COMPLETED | OUTPATIENT
Start: 2019-09-19 | End: 2019-09-19

## 2019-09-19 RX ORDER — OXYCODONE AND ACETAMINOPHEN 7.5; 325 MG/1; MG/1
1 TABLET ORAL EVERY 6 HOURS PRN
Status: DISCONTINUED | OUTPATIENT
Start: 2019-09-19 | End: 2019-09-19 | Stop reason: CLARIF

## 2019-09-19 RX ORDER — BUMETANIDE 1 MG/1
1 TABLET ORAL NIGHTLY
Status: DISCONTINUED | OUTPATIENT
Start: 2019-09-19 | End: 2019-09-20 | Stop reason: HOSPADM

## 2019-09-19 RX ORDER — ASCORBIC ACID 500 MG
500 TABLET ORAL DAILY
COMMUNITY

## 2019-09-19 RX ORDER — LOSARTAN POTASSIUM 50 MG/1
100 TABLET ORAL DAILY
Status: DISCONTINUED | OUTPATIENT
Start: 2019-09-19 | End: 2019-09-20 | Stop reason: HOSPADM

## 2019-09-19 RX ORDER — BUMETANIDE 1 MG/1
1 TABLET ORAL NIGHTLY
Status: ON HOLD | COMMUNITY
End: 2019-10-24 | Stop reason: HOSPADM

## 2019-09-19 RX ORDER — NICOTINE POLACRILEX 4 MG
15 LOZENGE BUCCAL PRN
Status: DISCONTINUED | OUTPATIENT
Start: 2019-09-19 | End: 2019-09-20 | Stop reason: HOSPADM

## 2019-09-19 RX ORDER — HYDROCHLOROTHIAZIDE 25 MG/1
25 TABLET ORAL DAILY
Status: DISCONTINUED | OUTPATIENT
Start: 2019-09-19 | End: 2019-09-20 | Stop reason: HOSPADM

## 2019-09-19 RX ORDER — CLONIDINE HYDROCHLORIDE 0.1 MG/1
0.1 TABLET ORAL DAILY
Status: DISCONTINUED | OUTPATIENT
Start: 2019-09-19 | End: 2019-09-20 | Stop reason: HOSPADM

## 2019-09-19 RX ORDER — DEXTROSE MONOHYDRATE 25 G/50ML
12.5 INJECTION, SOLUTION INTRAVENOUS PRN
Status: DISCONTINUED | OUTPATIENT
Start: 2019-09-19 | End: 2019-09-20 | Stop reason: HOSPADM

## 2019-09-19 RX ORDER — INSULIN GLARGINE 100 [IU]/ML
22 INJECTION, SOLUTION SUBCUTANEOUS NIGHTLY
Status: DISCONTINUED | OUTPATIENT
Start: 2019-09-19 | End: 2019-09-20 | Stop reason: HOSPADM

## 2019-09-19 RX ORDER — METHYLPREDNISOLONE SODIUM SUCCINATE 125 MG/2ML
80 INJECTION, POWDER, LYOPHILIZED, FOR SOLUTION INTRAMUSCULAR; INTRAVENOUS ONCE
Status: COMPLETED | OUTPATIENT
Start: 2019-09-19 | End: 2019-09-19

## 2019-09-19 RX ORDER — CETIRIZINE HYDROCHLORIDE 10 MG/1
5 TABLET ORAL NIGHTLY
Status: DISCONTINUED | OUTPATIENT
Start: 2019-09-19 | End: 2019-09-20 | Stop reason: HOSPADM

## 2019-09-19 RX ORDER — OXYCODONE HYDROCHLORIDE 5 MG/1
2.5 TABLET ORAL EVERY 4 HOURS PRN
Status: DISCONTINUED | OUTPATIENT
Start: 2019-09-19 | End: 2019-09-20 | Stop reason: HOSPADM

## 2019-09-19 RX ORDER — IPRATROPIUM BROMIDE AND ALBUTEROL SULFATE 2.5; .5 MG/3ML; MG/3ML
1 SOLUTION RESPIRATORY (INHALATION) ONCE
Status: COMPLETED | OUTPATIENT
Start: 2019-09-19 | End: 2019-09-19

## 2019-09-19 RX ORDER — DOCUSATE SODIUM 100 MG/1
100 CAPSULE, LIQUID FILLED ORAL PRN
COMMUNITY
End: 2021-06-08

## 2019-09-19 RX ORDER — SODIUM CHLORIDE 9 MG/ML
INJECTION, SOLUTION INTRAVENOUS CONTINUOUS
Status: ACTIVE | OUTPATIENT
Start: 2019-09-19 | End: 2019-09-20

## 2019-09-19 RX ORDER — FLUOXETINE HYDROCHLORIDE 20 MG/1
20 CAPSULE ORAL DAILY
Status: DISCONTINUED | OUTPATIENT
Start: 2019-09-19 | End: 2019-09-20 | Stop reason: HOSPADM

## 2019-09-19 RX ORDER — LOSARTAN POTASSIUM 100 MG/1
100 TABLET ORAL DAILY
Status: ON HOLD | COMMUNITY
End: 2019-10-23 | Stop reason: ALTCHOICE

## 2019-09-19 RX ORDER — LORATADINE 10 MG/1
10 TABLET ORAL DAILY
COMMUNITY
End: 2019-11-07 | Stop reason: ALTCHOICE

## 2019-09-19 RX ORDER — DEXTROSE MONOHYDRATE 50 MG/ML
100 INJECTION, SOLUTION INTRAVENOUS PRN
Status: DISCONTINUED | OUTPATIENT
Start: 2019-09-19 | End: 2019-09-20 | Stop reason: HOSPADM

## 2019-09-19 RX ORDER — AMLODIPINE BESYLATE 10 MG/1
10 TABLET ORAL DAILY
Status: DISCONTINUED | OUTPATIENT
Start: 2019-09-19 | End: 2019-09-20 | Stop reason: HOSPADM

## 2019-09-19 RX ORDER — FLUOXETINE HYDROCHLORIDE 20 MG/1
20 CAPSULE ORAL DAILY
COMMUNITY

## 2019-09-19 RX ORDER — CYCLOBENZAPRINE HCL 10 MG
10 TABLET ORAL 3 TIMES DAILY PRN
COMMUNITY

## 2019-09-19 RX ORDER — ONDANSETRON 2 MG/ML
4 INJECTION INTRAMUSCULAR; INTRAVENOUS EVERY 6 HOURS PRN
Status: DISCONTINUED | OUTPATIENT
Start: 2019-09-19 | End: 2019-09-20 | Stop reason: HOSPADM

## 2019-09-19 RX ORDER — SODIUM CHLORIDE 0.9 % (FLUSH) 0.9 %
10 SYRINGE (ML) INJECTION EVERY 12 HOURS SCHEDULED
Status: DISCONTINUED | OUTPATIENT
Start: 2019-09-19 | End: 2019-09-19 | Stop reason: SDUPTHER

## 2019-09-19 RX ORDER — SODIUM CHLORIDE 0.9 % (FLUSH) 0.9 %
10 SYRINGE (ML) INJECTION PRN
Status: DISCONTINUED | OUTPATIENT
Start: 2019-09-19 | End: 2019-09-20 | Stop reason: HOSPADM

## 2019-09-19 RX ADMIN — FLUOXETINE 20 MG: 20 CAPSULE ORAL at 12:30

## 2019-09-19 RX ADMIN — LORAZEPAM 0.5 MG: 2 INJECTION INTRAMUSCULAR at 09:56

## 2019-09-19 RX ADMIN — CETIRIZINE HYDROCHLORIDE 5 MG: 10 TABLET, FILM COATED ORAL at 20:27

## 2019-09-19 RX ADMIN — CLONIDINE HYDROCHLORIDE 0.2 MG: 0.2 TABLET ORAL at 20:27

## 2019-09-19 RX ADMIN — ENOXAPARIN SODIUM 40 MG: 40 INJECTION SUBCUTANEOUS at 14:13

## 2019-09-19 RX ADMIN — IOPAMIDOL 80 ML: 755 INJECTION, SOLUTION INTRAVENOUS at 09:54

## 2019-09-19 RX ADMIN — CLONIDINE HYDROCHLORIDE 0.1 MG: 0.2 TABLET ORAL at 12:30

## 2019-09-19 RX ADMIN — AMLODIPINE BESYLATE 10 MG: 10 TABLET ORAL at 12:30

## 2019-09-19 RX ADMIN — HYDROCHLOROTHIAZIDE 25 MG: 25 TABLET ORAL at 12:32

## 2019-09-19 RX ADMIN — INSULIN LISPRO 10 UNITS: 100 INJECTION, SOLUTION INTRAVENOUS; SUBCUTANEOUS at 12:35

## 2019-09-19 RX ADMIN — IPRATROPIUM BROMIDE AND ALBUTEROL SULFATE 1 AMPULE: 2.5; .5 SOLUTION RESPIRATORY (INHALATION) at 15:31

## 2019-09-19 RX ADMIN — BUMETANIDE 1 MG: 1 TABLET ORAL at 20:27

## 2019-09-19 RX ADMIN — INSULIN LISPRO 3 UNITS: 100 INJECTION, SOLUTION INTRAVENOUS; SUBCUTANEOUS at 20:34

## 2019-09-19 RX ADMIN — INSULIN LISPRO 6 UNITS: 100 INJECTION, SOLUTION INTRAVENOUS; SUBCUTANEOUS at 16:24

## 2019-09-19 RX ADMIN — LOSARTAN POTASSIUM 100 MG: 50 TABLET, FILM COATED ORAL at 12:30

## 2019-09-19 RX ADMIN — AZITHROMYCIN DIHYDRATE 500 MG: 500 INJECTION, POWDER, LYOPHILIZED, FOR SOLUTION INTRAVENOUS at 10:40

## 2019-09-19 RX ADMIN — IPRATROPIUM BROMIDE AND ALBUTEROL SULFATE 1 AMPULE: .5; 3 SOLUTION RESPIRATORY (INHALATION) at 08:33

## 2019-09-19 RX ADMIN — IPRATROPIUM BROMIDE AND ALBUTEROL SULFATE 1 AMPULE: 2.5; .5 SOLUTION RESPIRATORY (INHALATION) at 21:43

## 2019-09-19 RX ADMIN — METHYLPREDNISOLONE SODIUM SUCCINATE 80 MG: 125 INJECTION, POWDER, FOR SOLUTION INTRAMUSCULAR; INTRAVENOUS at 08:22

## 2019-09-19 RX ADMIN — Medication 10 ML: at 20:28

## 2019-09-19 RX ADMIN — CEFTRIAXONE 1 G: 1 INJECTION, POWDER, FOR SOLUTION INTRAMUSCULAR; INTRAVENOUS at 09:08

## 2019-09-19 RX ADMIN — SODIUM CHLORIDE: 9 INJECTION, SOLUTION INTRAVENOUS at 14:04

## 2019-09-19 RX ADMIN — INSULIN GLARGINE 22 UNITS: 100 INJECTION, SOLUTION SUBCUTANEOUS at 20:34

## 2019-09-19 ASSESSMENT — PAIN SCALES - GENERAL
PAINLEVEL_OUTOF10: 0
PAINLEVEL_OUTOF10: 0

## 2019-09-19 NOTE — ED NOTES
Pt complaint of SOB for a few days. Pt states it worse today. Pt states walking she is just exhausted and having difficulty breathing.       Bruce Lopez RN  09/19/19 [id number]

## 2019-09-19 NOTE — ED PROVIDER NOTES
Supple  Respiratory: Lungs clear to auscultation bilaterally, no wheezes, rales, or rhonchi. Mild respiratory distress. Arrie Mast. Hypoxic on room air: 88%. Cardiovascular:  Regular rate. Regular rhythm. No murmurs, gallops, or rubs. 2+ distal pulses  Chest: No chest wall tenderness  GI:  Abdomen Soft, Non tender, Non distended. +BS. No rebound, guarding, or rigidity. No pulsatile masses. Musculoskeletal: Moves all extremities x 4. Warm and well perfused, no clubbing, cyanosis, or edema. No leg edema. Integument: Skin warm and dry. Neurologic: GCS 15, no focal deficits, symmetric strength 5/5 in the upper and lower extremities bilaterally  Psychiatric: Normal Affect    -------------------------------------------------- RESULTS -------------------------------------------------  I have personally reviewed all laboratory and imaging results for this patient. Results are listed below.      LABS:  Results for orders placed or performed during the hospital encounter of 09/19/19   CBC Auto Differential   Result Value Ref Range    WBC 9.1 4.5 - 11.5 E9/L    RBC 4.79 3.50 - 5.50 E12/L    Hemoglobin 12.4 11.5 - 15.5 g/dL    Hematocrit 39.9 34.0 - 48.0 %    MCV 83.3 80.0 - 99.9 fL    MCH 25.9 (L) 26.0 - 35.0 pg    MCHC 31.1 (L) 32.0 - 34.5 %    RDW 15.0 11.5 - 15.0 fL    Platelets 658 279 - 980 E9/L    MPV 11.9 7.0 - 12.0 fL    Neutrophils % 77.6 43.0 - 80.0 %    Immature Granulocytes % 0.3 0.0 - 5.0 %    Lymphocytes % 13.2 (L) 20.0 - 42.0 %    Monocytes % 6.5 2.0 - 12.0 %    Eosinophils % 2.1 0.0 - 6.0 %    Basophils % 0.3 0.0 - 2.0 %    Neutrophils Absolute 7.09 1.80 - 7.30 E9/L    Immature Granulocytes # 0.03 E9/L    Lymphocytes Absolute 1.21 (L) 1.50 - 4.00 E9/L    Monocytes Absolute 0.59 0.10 - 0.95 E9/L    Eosinophils Absolute 0.19 0.05 - 0.50 E9/L    Basophils Absolute 0.03 0.00 - 0.20 W6/K   Basic Metabolic Panel   Result Value Ref Range    Sodium 136 132 - 146 mmol/L    Potassium 4.1 3.5 - 5.0 mmol/L Bag 9/19/19 1404)   ipratropium-albuterol (DUONEB) nebulizer solution 1 ampule (1 ampule Inhalation Given 9/19/19 1531)   glucose (GLUTOSE) 40 % oral gel 15 g (has no administration in time range)   dextrose 50 % IV solution (has no administration in time range)   glucagon (rDNA) injection 1 mg (has no administration in time range)   dextrose 5 % solution (has no administration in time range)   insulin lispro (HUMALOG) injection vial 0-12 Units (10 Units Subcutaneous Given 9/19/19 1235)   insulin lispro (HUMALOG) injection vial 0-6 Units (has no administration in time range)   sodium chloride flush 0.9 % injection 10 mL (has no administration in time range)   sodium chloride flush 0.9 % injection 10 mL (has no administration in time range)   oxyCODONE-acetaminophen (PERCOCET) 5-325 MG per tablet 1 tablet (has no administration in time range)     And   oxyCODONE (ROXICODONE) immediate release tablet 2.5 mg (has no administration in time range)   cloNIDine (CATAPRES) tablet 0.1 mg (0.1 mg Oral Given 9/19/19 1230)   cloNIDine (CATAPRES) tablet 0.2 mg (has no administration in time range)   ipratropium-albuterol (DUONEB) nebulizer solution 1 ampule (1 ampule Inhalation Given 9/19/19 0833)   methylPREDNISolone sodium (SOLU-MEDROL) injection 80 mg (80 mg Intravenous Given 9/19/19 0822)   cefTRIAXone (ROCEPHIN) 1 g in dextrose 5 % 50 mL IVPB (vial-mate) (0 g Intravenous Stopped 9/19/19 1020)   azithromycin (ZITHROMAX) 500 mg in D5W 250ml addavial (0 mg Intravenous Stopped 9/19/19 1204)   iopamidol (ISOVUE-370) 76 % injection 80 mL (80 mLs Intravenous Given 9/19/19 0954)   LORazepam (ATIVAN) injection 0.5 mg (0.5 mg Intravenous Given 9/19/19 0956)       Medical Decision Making:    She was seen and examined. Labs and imaging were ordered for concern for both pneumonia and pulmonary embolism. She ultimately received CTA of the chest breathing treatments and steroids.   Cup was concerning for pneumonia without CT evidence of pulmonary embolism. Biotics were initiated and patient was still requiring oxygen in the department which she normally does not wear oxygen at home. This was discussed with Dr. Tali Hammer who will admit. This patient's ED course included: a personal history and physicial examination, re-evaluation prior to disposition, cardiac monitoring and continuous pulse oximetry    This patient has remained hemodynamically stable during their ED course. Consultations:  Time: 10:34 AM.   Spoke with Dr. Hair Yuan (Hospitalist). Discussed case. They will admit this patient. Counseling: The emergency provider has spoken with the patient and discussed todays results, in addition to providing specific details for the plan of care and counseling regarding the diagnosis and prognosis. Questions are answered at this time and they are agreeable with the plan.     --------------------------------- IMPRESSION AND DISPOSITION ---------------------------------    IMPRESSION  1. Acute respiratory failure with hypoxia ([facility] [address]    2. COPD exacerbation ([facility] [address]    3. Pneumonia of right upper lobe due to infectious organism [facility])        DISPOSITION  Disposition: Admit to telemetry  Patient condition is stable    9/19/19, 8:40 AM.    This note is prepared by Isha Bhatia, acting as Scribe for Tashi Saavedra DO. Tashi Saavedra DO:  The scribe's documentation has been prepared under my direction and personally reviewed by me in its entirety. I confirm that the note above accurately reflects all work, treatment, procedures, and medical decision making performed by me.          Tashi Saavedra DO  09/19/19 [id number]

## 2019-09-19 NOTE — ED NOTES
SBAR faxed to floor. Will call to verify it was received. Spoke with Leah Sage if she did not receive it they will call back. Pt on her way up to floor.      Tati Medina RN  09/19/19 [id number]

## 2019-09-20 VITALS
RESPIRATION RATE: 16 BRPM | OXYGEN SATURATION: 96 % | HEIGHT: 63 IN | BODY MASS INDEX: 32.27 KG/M2 | TEMPERATURE: 98.1 F | WEIGHT: 182.1 LBS | HEART RATE: 77 BPM | SYSTOLIC BLOOD PRESSURE: 111 MMHG | DIASTOLIC BLOOD PRESSURE: 62 MMHG

## 2019-09-20 LAB
ALBUMIN SERPL-MCNC: 3.5 G/DL (ref 3.5–5.2)
ALP BLD-CCNC: 83 U/L (ref 35–104)
ALT SERPL-CCNC: 14 U/L (ref 0–32)
ANION GAP SERPL CALCULATED.3IONS-SCNC: 10 MMOL/L (ref 7–16)
AST SERPL-CCNC: 17 U/L (ref 0–31)
BASOPHILS ABSOLUTE: 0 E9/L (ref 0–0.2)
BASOPHILS RELATIVE PERCENT: 0 % (ref 0–2)
BILIRUB SERPL-MCNC: 0.5 MG/DL (ref 0–1.2)
BUN BLDV-MCNC: 20 MG/DL (ref 8–23)
CALCIUM SERPL-MCNC: 9.2 MG/DL (ref 8.6–10.2)
CHLORIDE BLD-SCNC: 98 MMOL/L (ref 98–107)
CO2: 27 MMOL/L (ref 22–29)
CREAT SERPL-MCNC: 0.9 MG/DL (ref 0.5–1)
EKG ATRIAL RATE: 99 BPM
EKG P AXIS: 51 DEGREES
EKG P-R INTERVAL: 138 MS
EKG Q-T INTERVAL: 382 MS
EKG QRS DURATION: 96 MS
EKG QTC CALCULATION (BAZETT): 490 MS
EKG R AXIS: 60 DEGREES
EKG T AXIS: 86 DEGREES
EKG VENTRICULAR RATE: 99 BPM
EOSINOPHILS ABSOLUTE: 0 E9/L (ref 0.05–0.5)
EOSINOPHILS RELATIVE PERCENT: 0 % (ref 0–6)
FILM ARRAY ADENOVIRUS: NORMAL
FILM ARRAY BORDETELLA PERTUSSIS: NORMAL
FILM ARRAY CHLAMYDOPHILIA PNEUMONIAE: NORMAL
FILM ARRAY CORONAVIRUS 229E: NORMAL
FILM ARRAY CORONAVIRUS HKU1: NORMAL
FILM ARRAY CORONAVIRUS NL63: NORMAL
FILM ARRAY CORONAVIRUS OC43: NORMAL
FILM ARRAY INFLUENZA A VIRUS 09H1: NORMAL
FILM ARRAY INFLUENZA A VIRUS H1: NORMAL
FILM ARRAY INFLUENZA A VIRUS H3: NORMAL
FILM ARRAY INFLUENZA A VIRUS: NORMAL
FILM ARRAY INFLUENZA B: NORMAL
FILM ARRAY METAPNEUMOVIRUS: NORMAL
FILM ARRAY MYCOPLASMA PNEUMONIAE: NORMAL
FILM ARRAY PARAINFLUENZA VIRUS 1: NORMAL
FILM ARRAY PARAINFLUENZA VIRUS 2: NORMAL
FILM ARRAY PARAINFLUENZA VIRUS 3: NORMAL
FILM ARRAY PARAINFLUENZA VIRUS 4: NORMAL
FILM ARRAY RESPIRATORY SYNCITIAL VIRUS: NORMAL
FILM ARRAY RHINOVIRUS/ENTEROVIRUS: NORMAL
GFR AFRICAN AMERICAN: >60
GFR NON-AFRICAN AMERICAN: >60 ML/MIN/1.73
GLUCOSE BLD-MCNC: 255 MG/DL (ref 74–99)
HBA1C MFR BLD: 9.7 % (ref 4–5.6)
HCT VFR BLD CALC: 34.7 % (ref 34–48)
HEMOGLOBIN: 11.3 G/DL (ref 11.5–15.5)
IMMATURE GRANULOCYTES #: 0.04 E9/L
IMMATURE GRANULOCYTES %: 0.5 % (ref 0–5)
LYMPHOCYTES ABSOLUTE: 0.75 E9/L (ref 1.5–4)
LYMPHOCYTES RELATIVE PERCENT: 10.1 % (ref 20–42)
MCH RBC QN AUTO: 26.5 PG (ref 26–35)
MCHC RBC AUTO-ENTMCNC: 32.6 % (ref 32–34.5)
MCV RBC AUTO: 81.3 FL (ref 80–99.9)
METER GLUCOSE: 139 MG/DL (ref 74–99)
METER GLUCOSE: 223 MG/DL (ref 74–99)
METER GLUCOSE: 227 MG/DL (ref 74–99)
MONOCYTES ABSOLUTE: 0.35 E9/L (ref 0.1–0.95)
MONOCYTES RELATIVE PERCENT: 4.7 % (ref 2–12)
NEUTROPHILS ABSOLUTE: 6.3 E9/L (ref 1.8–7.3)
NEUTROPHILS RELATIVE PERCENT: 84.7 % (ref 43–80)
PDW BLD-RTO: 15 FL (ref 11.5–15)
PHOSPHORUS: 2.8 MG/DL (ref 2.5–4.5)
PLATELET # BLD: 162 E9/L (ref 130–450)
PMV BLD AUTO: 12 FL (ref 7–12)
POTASSIUM REFLEX MAGNESIUM: 4.2 MMOL/L (ref 3.5–5)
PROCALCITONIN: 0.14 NG/ML (ref 0–0.08)
RBC # BLD: 4.27 E12/L (ref 3.5–5.5)
SODIUM BLD-SCNC: 135 MMOL/L (ref 132–146)
TOTAL PROTEIN: 6.6 G/DL (ref 6.4–8.3)
WBC # BLD: 7.4 E9/L (ref 4.5–11.5)

## 2019-09-20 PROCEDURE — 2700000000 HC OXYGEN THERAPY PER DAY

## 2019-09-20 PROCEDURE — 84100 ASSAY OF PHOSPHORUS: CPT

## 2019-09-20 PROCEDURE — 36415 COLL VENOUS BLD VENIPUNCTURE: CPT

## 2019-09-20 PROCEDURE — 6370000000 HC RX 637 (ALT 250 FOR IP): Performed by: NURSE PRACTITIONER

## 2019-09-20 PROCEDURE — 82962 GLUCOSE BLOOD TEST: CPT

## 2019-09-20 PROCEDURE — 84145 PROCALCITONIN (PCT): CPT

## 2019-09-20 PROCEDURE — 83036 HEMOGLOBIN GLYCOSYLATED A1C: CPT

## 2019-09-20 PROCEDURE — 93010 ELECTROCARDIOGRAM REPORT: CPT | Performed by: INTERNAL MEDICINE

## 2019-09-20 PROCEDURE — 94640 AIRWAY INHALATION TREATMENT: CPT

## 2019-09-20 PROCEDURE — 99239 HOSP IP/OBS DSCHRG MGMT >30: CPT | Performed by: INTERNAL MEDICINE

## 2019-09-20 PROCEDURE — 6360000002 HC RX W HCPCS: Performed by: NURSE PRACTITIONER

## 2019-09-20 PROCEDURE — 85025 COMPLETE CBC W/AUTO DIFF WBC: CPT

## 2019-09-20 PROCEDURE — 80053 COMPREHEN METABOLIC PANEL: CPT

## 2019-09-20 PROCEDURE — 2580000003 HC RX 258: Performed by: NURSE PRACTITIONER

## 2019-09-20 RX ORDER — CEFUROXIME AXETIL 500 MG/1
500 TABLET ORAL 2 TIMES DAILY
Qty: 24 TABLET | Refills: 0 | Status: SHIPPED | OUTPATIENT
Start: 2019-09-20 | End: 2019-10-02

## 2019-09-20 RX ORDER — AZITHROMYCIN 500 MG/1
500 TABLET, FILM COATED ORAL DAILY
Qty: 3 TABLET | Refills: 0 | Status: SHIPPED | OUTPATIENT
Start: 2019-09-20 | End: 2019-09-24

## 2019-09-20 RX ADMIN — IPRATROPIUM BROMIDE AND ALBUTEROL SULFATE 1 AMPULE: 2.5; .5 SOLUTION RESPIRATORY (INHALATION) at 12:24

## 2019-09-20 RX ADMIN — SODIUM CHLORIDE: 9 INJECTION, SOLUTION INTRAVENOUS at 06:14

## 2019-09-20 RX ADMIN — ENOXAPARIN SODIUM 40 MG: 40 INJECTION SUBCUTANEOUS at 13:30

## 2019-09-20 RX ADMIN — FLUOXETINE 20 MG: 20 CAPSULE ORAL at 08:00

## 2019-09-20 RX ADMIN — INSULIN LISPRO 4 UNITS: 100 INJECTION, SOLUTION INTRAVENOUS; SUBCUTANEOUS at 06:08

## 2019-09-20 RX ADMIN — Medication 10 ML: at 08:01

## 2019-09-20 RX ADMIN — AMLODIPINE BESYLATE 10 MG: 10 TABLET ORAL at 08:00

## 2019-09-20 RX ADMIN — INSULIN LISPRO 4 UNITS: 100 INJECTION, SOLUTION INTRAVENOUS; SUBCUTANEOUS at 11:07

## 2019-09-20 RX ADMIN — LOSARTAN POTASSIUM 100 MG: 50 TABLET, FILM COATED ORAL at 08:00

## 2019-09-20 RX ADMIN — HYDROCHLOROTHIAZIDE 25 MG: 25 TABLET ORAL at 08:00

## 2019-09-20 RX ADMIN — CLONIDINE HYDROCHLORIDE 0.1 MG: 0.2 TABLET ORAL at 08:00

## 2019-09-20 RX ADMIN — IPRATROPIUM BROMIDE AND ALBUTEROL SULFATE 1 AMPULE: 2.5; .5 SOLUTION RESPIRATORY (INHALATION) at 16:06

## 2019-09-20 RX ADMIN — IPRATROPIUM BROMIDE AND ALBUTEROL SULFATE 1 AMPULE: 2.5; .5 SOLUTION RESPIRATORY (INHALATION) at 08:13

## 2019-09-20 RX ADMIN — CEFTRIAXONE 1 G: 1 INJECTION, POWDER, FOR SOLUTION INTRAMUSCULAR; INTRAVENOUS at 08:30

## 2019-09-20 RX ADMIN — AZITHROMYCIN DIHYDRATE 500 MG: 500 INJECTION, POWDER, LYOPHILIZED, FOR SOLUTION INTRAVENOUS at 10:14

## 2019-09-20 ASSESSMENT — PAIN SCALES - GENERAL
PAINLEVEL_OUTOF10: 0

## 2019-09-20 NOTE — DISCHARGE SUMMARY
to the ED for further evaluation. Patient states that her granddaughter recently had a cold and she originally thought she caught the cold from her granddaughter. Patient denies any fevers, chills, nausea, vomiting, or chest pain. Patient states that as of today she has been coughing up yellow mucus. Patient denies any history of tobacco use, illicit drug or alcohol abuse. Pt seen and examined. Pt improved abx. Pt was weaned off oxygen. On day of discharge, pt denied fevers, chills,n/v. Pt has cough but improving and not productive. Pt directed to take ibuprofen otc for rib discomfort with coughing and may take otc cough medicine at her discretion. Discharge planning d/w pt. Time given for questions and all questions answered. Discharge Exam:  Vitals:    09/20/19 0035 09/20/19 0615 09/20/19 0800 09/20/19 0900   BP: 126/60  118/64    Pulse: 92  78    Resp: 18  18    Temp: 97.6 °F (36.4 °C)  97.6 °F (36.4 °C)    TempSrc: Oral  Oral    SpO2: 97%  98% 94%   Weight:  182 lb 1.6 oz (82.6 kg)     Height:           Skin: warm and dry, no rash or erythema  Pulmonary/Chest: clear to auscultation bilaterally- no wheezes, rales or rhonchi, normal air movement, no respiratory distress  Cardiovascular: rhythm reg at rate of 88  Abdomen: soft, non-tender, non-distended, normal bowel sounds, no masses or organomegaly  Extremities: no cyanosis, no clubbing and no edema  I/O last 3 completed shifts: In: 4842 [P.O.:240; I.V.:799]  Out: 2500 [Urine:2500]  No intake/output data recorded.       LABS:  Recent Labs     09/19/19  0812 09/20/19  0355    135   K 4.1 4.2   CL 98 98   CO2 28 27   BUN 18 20   CREATININE 0.9 0.9   GLUCOSE 265* 255*   CALCIUM 9.2 9.2       Recent Labs     09/19/19  0812 09/20/19  0355   WBC 9.1 7.4   RBC 4.79 4.27   HGB 12.4 11.3*   HCT 39.9 34.7   MCV 83.3 81.3   MCH 25.9* 26.5   MCHC 31.1* 32.6   RDW 15.0 15.0    162   MPV 11.9 12.0       No results for input(s): POCGLU in the last 72 10 MG tablet  Commonly known as:  NORVASC     bumetanide 1 MG tablet  Commonly known as:  BUMEX     CLARITIN 10 MG tablet  Generic drug:  loratadine     cloNIDine 0.3 MG/24HR  Commonly known as:  CATAPRES     cyclobenzaprine 10 MG tablet  Commonly known as:  FLEXERIL     FLUoxetine 20 MG capsule  Commonly known as:  PROZAC     hydrochlorothiazide 25 MG tablet  Commonly known as:  HYDRODIURIL     insulin glargine 100 UNIT/ML injection vial  Commonly known as:  LANTUS     losartan 100 MG tablet  Commonly known as:  COZAAR     ondansetron 8 MG Tbdp disintegrating tablet  Commonly known as:  ZOFRAN-ODT  Take 1 tablet by mouth every 8 hours as needed for Nausea or Vomiting     oxyCODONE-acetaminophen 7.5-325 MG per tablet  Commonly known as:  PERCOCET     RA COL-RITE 100 MG capsule  Generic drug:  docusate sodium     therapeutic multivitamin-minerals tablet     vitamin C 500 MG tablet  Commonly known as:  ASCORBIC ACID        STOP taking these medications    carvedilol 25 MG tablet  Commonly known as:  COREG           Where to Get Your Medications      These medications were sent to [id number] [address], [address] [facility] [place] [phone]  [address] [place] [address]    Phone:  [phone]   · azithromycin 500 MG tablet  · cefUROXime 500 MG tablet           Total time for discharge is 37 min    Signed:  Electronically signed by Isabella Doyle DO on 9/20/2019 at 2:02 PM

## 2019-10-20 ENCOUNTER — APPOINTMENT (OUTPATIENT)
Dept: GENERAL RADIOLOGY | Age: 69
DRG: 291 | End: 2019-10-20
Payer: MEDICARE

## 2019-10-20 ENCOUNTER — APPOINTMENT (OUTPATIENT)
Dept: CT IMAGING | Age: 69
DRG: 291 | End: 2019-10-20
Payer: MEDICARE

## 2019-10-20 ENCOUNTER — HOSPITAL ENCOUNTER (INPATIENT)
Age: 69
LOS: 4 days | Discharge: HOME | DRG: 291 | End: 2019-10-24
Attending: EMERGENCY MEDICINE | Admitting: INTERNAL MEDICINE
Payer: MEDICARE

## 2019-10-20 DIAGNOSIS — J96.01 ACUTE RESPIRATORY FAILURE WITH HYPOXIA (HCC): Primary | ICD-10-CM

## 2019-10-20 DIAGNOSIS — I50.9 ACUTE ON CHRONIC CONGESTIVE HEART FAILURE, UNSPECIFIED HEART FAILURE TYPE (HCC): ICD-10-CM

## 2019-10-20 PROBLEM — J96.21 ACUTE AND CHRONIC RESPIRATORY FAILURE WITH HYPOXIA (HCC): Status: ACTIVE | Noted: 2019-10-20

## 2019-10-20 LAB
AADO2: 210 MMHG
ANION GAP SERPL CALCULATED.3IONS-SCNC: 11 MMOL/L (ref 7–16)
B.E.: -3.8 MMOL/L (ref -3–3)
BASOPHILS ABSOLUTE: 0.06 E9/L (ref 0–0.2)
BASOPHILS RELATIVE PERCENT: 0.5 % (ref 0–2)
BUN BLDV-MCNC: 20 MG/DL (ref 8–23)
CALCIUM SERPL-MCNC: 8.9 MG/DL (ref 8.6–10.2)
CHLORIDE BLD-SCNC: 102 MMOL/L (ref 98–107)
CO2: 26 MMOL/L (ref 22–29)
COHB: 0.9 % (ref 0–1.5)
CREAT SERPL-MCNC: 0.9 MG/DL (ref 0.5–1)
CRITICAL: ABNORMAL
D DIMER: 1823 NG/ML DDU
DATE ANALYZED: ABNORMAL
DATE OF COLLECTION: ABNORMAL
EOSINOPHILS ABSOLUTE: 0.25 E9/L (ref 0.05–0.5)
EOSINOPHILS RELATIVE PERCENT: 1.9 % (ref 0–6)
FIO2: 50 %
GFR AFRICAN AMERICAN: >60
GFR NON-AFRICAN AMERICAN: >60 ML/MIN/1.73
GLUCOSE BLD-MCNC: 340 MG/DL (ref 74–99)
HCO3: 22.4 MMOL/L (ref 22–26)
HCT VFR BLD CALC: 41.7 % (ref 34–48)
HEMOGLOBIN: 12.6 G/DL (ref 11.5–15.5)
HHB: 4.8 % (ref 0–5)
IMMATURE GRANULOCYTES #: 0.06 E9/L
IMMATURE GRANULOCYTES %: 0.5 % (ref 0–5)
LAB: ABNORMAL
LACTIC ACID, SEPSIS: 1.8 MMOL/L (ref 0.5–1.9)
LV EF: 28 %
LVEF MODALITY: NORMAL
LYMPHOCYTES ABSOLUTE: 3.09 E9/L (ref 1.5–4)
LYMPHOCYTES RELATIVE PERCENT: 23.7 % (ref 20–42)
Lab: ABNORMAL
MCH RBC QN AUTO: 25.6 PG (ref 26–35)
MCHC RBC AUTO-ENTMCNC: 30.2 % (ref 32–34.5)
MCV RBC AUTO: 84.8 FL (ref 80–99.9)
METER GLUCOSE: 169 MG/DL (ref 74–99)
METHB: 0.2 % (ref 0–1.5)
MODE: ABNORMAL
MONOCYTES ABSOLUTE: 0.53 E9/L (ref 0.1–0.95)
MONOCYTES RELATIVE PERCENT: 4.1 % (ref 2–12)
NEUTROPHILS ABSOLUTE: 9.05 E9/L (ref 1.8–7.3)
NEUTROPHILS RELATIVE PERCENT: 69.3 % (ref 43–80)
O2 CONTENT: 17 ML/DL
O2 SATURATION: 95.1 % (ref 92–98.5)
O2HB: 94.1 % (ref 94–97)
OPERATOR ID: 316
PATIENT TEMP: 37 C
PCO2: 44.7 MMHG (ref 35–45)
PDW BLD-RTO: 14.3 FL (ref 11.5–15)
PEEP/CPAP: 6 CMH2O
PFO2: 1.67 MMHG/%
PH BLOOD GAS: 7.32 (ref 7.35–7.45)
PLATELET # BLD: 189 E9/L (ref 130–450)
PMV BLD AUTO: 12.2 FL (ref 7–12)
PO2: 83.7 MMHG (ref 60–100)
POTASSIUM SERPL-SCNC: 4 MMOL/L (ref 3.5–5)
PRO-BNP: 2832 PG/ML (ref 0–125)
PROCALCITONIN: 0.9 NG/ML (ref 0–0.08)
PS: 12 CMH20
RBC # BLD: 4.92 E12/L (ref 3.5–5.5)
RI(T): 251 %
RR MECHANICAL: 12 B/MIN
SODIUM BLD-SCNC: 139 MMOL/L (ref 132–146)
SOURCE, BLOOD GAS: ABNORMAL
T4 FREE: 1.1 NG/DL (ref 0.93–1.7)
THB: 12.8 G/DL (ref 11.5–16.5)
TIME ANALYZED: 1001
TROPONIN: 0.05 NG/ML (ref 0–0.03)
TROPONIN: 0.05 NG/ML (ref 0–0.03)
TROPONIN: <0.01 NG/ML (ref 0–0.03)
WBC # BLD: 13 E9/L (ref 4.5–11.5)

## 2019-10-20 PROCEDURE — 85025 COMPLETE CBC W/AUTO DIFF WBC: CPT

## 2019-10-20 PROCEDURE — 87486 CHLMYD PNEUM DNA AMP PROBE: CPT

## 2019-10-20 PROCEDURE — 71275 CT ANGIOGRAPHY CHEST: CPT

## 2019-10-20 PROCEDURE — 84145 PROCALCITONIN (PCT): CPT

## 2019-10-20 PROCEDURE — 6360000004 HC RX CONTRAST MEDICATION: Performed by: RADIOLOGY

## 2019-10-20 PROCEDURE — 83880 ASSAY OF NATRIURETIC PEPTIDE: CPT

## 2019-10-20 PROCEDURE — 6360000002 HC RX W HCPCS: Performed by: EMERGENCY MEDICINE

## 2019-10-20 PROCEDURE — 6360000002 HC RX W HCPCS: Performed by: PHYSICIAN ASSISTANT

## 2019-10-20 PROCEDURE — 80048 BASIC METABOLIC PNL TOTAL CA: CPT

## 2019-10-20 PROCEDURE — 99291 CRITICAL CARE FIRST HOUR: CPT

## 2019-10-20 PROCEDURE — 94760 N-INVAS EAR/PLS OXIMETRY 1: CPT

## 2019-10-20 PROCEDURE — 51702 INSERT TEMP BLADDER CATH: CPT

## 2019-10-20 PROCEDURE — 36415 COLL VENOUS BLD VENIPUNCTURE: CPT

## 2019-10-20 PROCEDURE — 2060000000 HC ICU INTERMEDIATE R&B

## 2019-10-20 PROCEDURE — 83605 ASSAY OF LACTIC ACID: CPT

## 2019-10-20 PROCEDURE — 82805 BLOOD GASES W/O2 SATURATION: CPT

## 2019-10-20 PROCEDURE — 94664 DEMO&/EVAL PT USE INHALER: CPT

## 2019-10-20 PROCEDURE — 6370000000 HC RX 637 (ALT 250 FOR IP): Performed by: EMERGENCY MEDICINE

## 2019-10-20 PROCEDURE — APPSS45 APP SPLIT SHARED TIME 31-45 MINUTES: Performed by: PHYSICIAN ASSISTANT

## 2019-10-20 PROCEDURE — 93306 TTE W/DOPPLER COMPLETE: CPT

## 2019-10-20 PROCEDURE — 93005 ELECTROCARDIOGRAM TRACING: CPT | Performed by: EMERGENCY MEDICINE

## 2019-10-20 PROCEDURE — 6370000000 HC RX 637 (ALT 250 FOR IP): Performed by: PHYSICIAN ASSISTANT

## 2019-10-20 PROCEDURE — 87633 RESP VIRUS 12-25 TARGETS: CPT

## 2019-10-20 PROCEDURE — 82962 GLUCOSE BLOOD TEST: CPT

## 2019-10-20 PROCEDURE — 94660 CPAP INITIATION&MGMT: CPT

## 2019-10-20 PROCEDURE — 84484 ASSAY OF TROPONIN QUANT: CPT

## 2019-10-20 PROCEDURE — 84439 ASSAY OF FREE THYROXINE: CPT

## 2019-10-20 PROCEDURE — 2580000003 HC RX 258: Performed by: PHYSICIAN ASSISTANT

## 2019-10-20 PROCEDURE — 87581 M.PNEUMON DNA AMP PROBE: CPT

## 2019-10-20 PROCEDURE — 94640 AIRWAY INHALATION TREATMENT: CPT

## 2019-10-20 PROCEDURE — 85378 FIBRIN DEGRADE SEMIQUANT: CPT

## 2019-10-20 PROCEDURE — 87798 DETECT AGENT NOS DNA AMP: CPT

## 2019-10-20 PROCEDURE — 99222 1ST HOSP IP/OBS MODERATE 55: CPT | Performed by: INTERNAL MEDICINE

## 2019-10-20 PROCEDURE — 71045 X-RAY EXAM CHEST 1 VIEW: CPT

## 2019-10-20 RX ORDER — LOSARTAN POTASSIUM 50 MG/1
100 TABLET ORAL DAILY
Status: DISCONTINUED | OUTPATIENT
Start: 2019-10-20 | End: 2019-10-22

## 2019-10-20 RX ORDER — SODIUM CHLORIDE 0.9 % (FLUSH) 0.9 %
10 SYRINGE (ML) INJECTION PRN
Status: DISCONTINUED | OUTPATIENT
Start: 2019-10-20 | End: 2019-10-24 | Stop reason: HOSPADM

## 2019-10-20 RX ORDER — IPRATROPIUM BROMIDE AND ALBUTEROL SULFATE 2.5; .5 MG/3ML; MG/3ML
1 SOLUTION RESPIRATORY (INHALATION)
Status: DISCONTINUED | OUTPATIENT
Start: 2019-10-20 | End: 2019-10-24 | Stop reason: HOSPADM

## 2019-10-20 RX ORDER — SODIUM CHLORIDE 0.9 % (FLUSH) 0.9 %
10 SYRINGE (ML) INJECTION EVERY 12 HOURS SCHEDULED
Status: DISCONTINUED | OUTPATIENT
Start: 2019-10-20 | End: 2019-10-24 | Stop reason: HOSPADM

## 2019-10-20 RX ORDER — CETIRIZINE HYDROCHLORIDE 10 MG/1
10 TABLET ORAL DAILY
Status: DISCONTINUED | OUTPATIENT
Start: 2019-10-20 | End: 2019-10-24 | Stop reason: HOSPADM

## 2019-10-20 RX ORDER — ONDANSETRON 2 MG/ML
4 INJECTION INTRAMUSCULAR; INTRAVENOUS EVERY 6 HOURS PRN
Status: DISCONTINUED | OUTPATIENT
Start: 2019-10-20 | End: 2019-10-24 | Stop reason: HOSPADM

## 2019-10-20 RX ORDER — ACETAMINOPHEN 325 MG/1
650 TABLET ORAL EVERY 4 HOURS PRN
Status: DISCONTINUED | OUTPATIENT
Start: 2019-10-20 | End: 2019-10-24 | Stop reason: HOSPADM

## 2019-10-20 RX ORDER — AMLODIPINE BESYLATE 10 MG/1
10 TABLET ORAL DAILY
Status: DISCONTINUED | OUTPATIENT
Start: 2019-10-20 | End: 2019-10-21

## 2019-10-20 RX ORDER — INSULIN GLARGINE 100 [IU]/ML
22 INJECTION, SOLUTION SUBCUTANEOUS NIGHTLY
Status: DISCONTINUED | OUTPATIENT
Start: 2019-10-20 | End: 2019-10-24 | Stop reason: HOSPADM

## 2019-10-20 RX ORDER — FUROSEMIDE 10 MG/ML
40 INJECTION INTRAMUSCULAR; INTRAVENOUS ONCE
Status: COMPLETED | OUTPATIENT
Start: 2019-10-20 | End: 2019-10-20

## 2019-10-20 RX ORDER — ASPIRIN 81 MG/1
324 TABLET, CHEWABLE ORAL ONCE
Status: COMPLETED | OUTPATIENT
Start: 2019-10-20 | End: 2019-10-20

## 2019-10-20 RX ORDER — IPRATROPIUM BROMIDE AND ALBUTEROL SULFATE 2.5; .5 MG/3ML; MG/3ML
1 SOLUTION RESPIRATORY (INHALATION) ONCE
Status: COMPLETED | OUTPATIENT
Start: 2019-10-20 | End: 2019-10-20

## 2019-10-20 RX ORDER — FUROSEMIDE 20 MG/1
20 TABLET ORAL DAILY
Status: DISCONTINUED | OUTPATIENT
Start: 2019-10-20 | End: 2019-10-20

## 2019-10-20 RX ORDER — CLONIDINE HYDROCHLORIDE 0.1 MG/1
0.1 TABLET ORAL 2 TIMES DAILY
Status: DISCONTINUED | OUTPATIENT
Start: 2019-10-20 | End: 2019-10-22

## 2019-10-20 RX ORDER — FLUOXETINE HYDROCHLORIDE 20 MG/1
20 CAPSULE ORAL DAILY
Status: DISCONTINUED | OUTPATIENT
Start: 2019-10-20 | End: 2019-10-24 | Stop reason: HOSPADM

## 2019-10-20 RX ORDER — M-VIT,TX,IRON,MINS/CALC/FOLIC 27MG-0.4MG
1 TABLET ORAL DAILY
Status: DISCONTINUED | OUTPATIENT
Start: 2019-10-20 | End: 2019-10-24 | Stop reason: HOSPADM

## 2019-10-20 RX ORDER — DOCUSATE SODIUM 100 MG/1
100 CAPSULE, LIQUID FILLED ORAL 2 TIMES DAILY
Status: DISCONTINUED | OUTPATIENT
Start: 2019-10-20 | End: 2019-10-24 | Stop reason: HOSPADM

## 2019-10-20 RX ORDER — FUROSEMIDE 10 MG/ML
20 INJECTION INTRAMUSCULAR; INTRAVENOUS DAILY
Status: DISCONTINUED | OUTPATIENT
Start: 2019-10-20 | End: 2019-10-21

## 2019-10-20 RX ADMIN — Medication 10 ML: at 17:05

## 2019-10-20 RX ADMIN — Medication 10 ML: at 21:24

## 2019-10-20 RX ADMIN — FLUOXETINE 20 MG: 20 CAPSULE ORAL at 17:04

## 2019-10-20 RX ADMIN — LOSARTAN POTASSIUM 100 MG: 50 TABLET, FILM COATED ORAL at 17:04

## 2019-10-20 RX ADMIN — ASPIRIN 81 MG 324 MG: 81 TABLET ORAL at 17:04

## 2019-10-20 RX ADMIN — DOCUSATE SODIUM 100 MG: 100 CAPSULE, LIQUID FILLED ORAL at 17:04

## 2019-10-20 RX ADMIN — Medication 10 ML: at 20:40

## 2019-10-20 RX ADMIN — IPRATROPIUM BROMIDE AND ALBUTEROL SULFATE 1 AMPULE: .5; 3 SOLUTION RESPIRATORY (INHALATION) at 19:48

## 2019-10-20 RX ADMIN — FUROSEMIDE 40 MG: 10 INJECTION, SOLUTION INTRAMUSCULAR; INTRAVENOUS at 10:05

## 2019-10-20 RX ADMIN — Medication 1 TABLET: at 17:04

## 2019-10-20 RX ADMIN — CLONIDINE HYDROCHLORIDE 0.1 MG: 0.1 TABLET ORAL at 17:04

## 2019-10-20 RX ADMIN — FUROSEMIDE 20 MG: 10 INJECTION, SOLUTION INTRAVENOUS at 17:05

## 2019-10-20 RX ADMIN — CLONIDINE HYDROCHLORIDE 0.1 MG: 0.1 TABLET ORAL at 21:22

## 2019-10-20 RX ADMIN — INSULIN GLARGINE 22 UNITS: 100 INJECTION, SOLUTION SUBCUTANEOUS at 21:23

## 2019-10-20 RX ADMIN — IPRATROPIUM BROMIDE AND ALBUTEROL SULFATE 1 AMPULE: .5; 3 SOLUTION RESPIRATORY (INHALATION) at 08:28

## 2019-10-20 RX ADMIN — AMLODIPINE BESYLATE 10 MG: 10 TABLET ORAL at 17:04

## 2019-10-20 RX ADMIN — CETIRIZINE HYDROCHLORIDE 10 MG: 10 TABLET, FILM COATED ORAL at 17:04

## 2019-10-20 RX ADMIN — IOPAMIDOL 80 ML: 755 INJECTION, SOLUTION INTRAVENOUS at 20:42

## 2019-10-20 RX ADMIN — IPRATROPIUM BROMIDE AND ALBUTEROL SULFATE 1 AMPULE: .5; 3 SOLUTION RESPIRATORY (INHALATION) at 15:49

## 2019-10-20 RX ADMIN — ENOXAPARIN SODIUM 40 MG: 40 INJECTION SUBCUTANEOUS at 17:04

## 2019-10-20 RX ADMIN — DOCUSATE SODIUM 100 MG: 100 CAPSULE, LIQUID FILLED ORAL at 21:22

## 2019-10-20 ASSESSMENT — ENCOUNTER SYMPTOMS
EYE DISCHARGE: 0
ABDOMINAL DISTENTION: 0
STRIDOR: 0
WHEEZING: 0
CHOKING: 0
VOMITING: 0
SHORTNESS OF BREATH: 1
COLOR CHANGE: 0
COUGH: 0
SINUS PRESSURE: 0
ABDOMINAL PAIN: 0
NAUSEA: 0
EYE ITCHING: 0
EYE REDNESS: 0
RHINORRHEA: 0
CHEST TIGHTNESS: 0
VOICE CHANGE: 0
SORE THROAT: 0
DIARRHEA: 0

## 2019-10-20 ASSESSMENT — PAIN SCALES - GENERAL
PAINLEVEL_OUTOF10: 0
PAINLEVEL_OUTOF10: 0

## 2019-10-21 LAB
ALBUMIN SERPL-MCNC: 3.5 G/DL (ref 3.5–5.2)
ALP BLD-CCNC: 81 U/L (ref 35–104)
ALT SERPL-CCNC: 13 U/L (ref 0–32)
ANION GAP SERPL CALCULATED.3IONS-SCNC: 10 MMOL/L (ref 7–16)
AST SERPL-CCNC: 15 U/L (ref 0–31)
BASOPHILS ABSOLUTE: 0.02 E9/L (ref 0–0.2)
BASOPHILS RELATIVE PERCENT: 0.3 % (ref 0–2)
BILIRUB SERPL-MCNC: 0.7 MG/DL (ref 0–1.2)
BUN BLDV-MCNC: 22 MG/DL (ref 8–23)
CALCIUM SERPL-MCNC: 9 MG/DL (ref 8.6–10.2)
CHLORIDE BLD-SCNC: 103 MMOL/L (ref 98–107)
CO2: 29 MMOL/L (ref 22–29)
CREAT SERPL-MCNC: 0.9 MG/DL (ref 0.5–1)
EOSINOPHILS ABSOLUTE: 0.12 E9/L (ref 0.05–0.5)
EOSINOPHILS RELATIVE PERCENT: 1.6 % (ref 0–6)
FILM ARRAY ADENOVIRUS: NORMAL
FILM ARRAY BORDETELLA PERTUSSIS: NORMAL
FILM ARRAY CHLAMYDOPHILIA PNEUMONIAE: NORMAL
FILM ARRAY CORONAVIRUS 229E: NORMAL
FILM ARRAY CORONAVIRUS HKU1: NORMAL
FILM ARRAY CORONAVIRUS NL63: NORMAL
FILM ARRAY CORONAVIRUS OC43: NORMAL
FILM ARRAY INFLUENZA A VIRUS 09H1: NORMAL
FILM ARRAY INFLUENZA A VIRUS H1: NORMAL
FILM ARRAY INFLUENZA A VIRUS H3: NORMAL
FILM ARRAY INFLUENZA A VIRUS: NORMAL
FILM ARRAY INFLUENZA B: NORMAL
FILM ARRAY METAPNEUMOVIRUS: NORMAL
FILM ARRAY MYCOPLASMA PNEUMONIAE: NORMAL
FILM ARRAY PARAINFLUENZA VIRUS 1: NORMAL
FILM ARRAY PARAINFLUENZA VIRUS 2: NORMAL
FILM ARRAY PARAINFLUENZA VIRUS 3: NORMAL
FILM ARRAY PARAINFLUENZA VIRUS 4: NORMAL
FILM ARRAY RESPIRATORY SYNCITIAL VIRUS: NORMAL
FILM ARRAY RHINOVIRUS/ENTEROVIRUS: NORMAL
GFR AFRICAN AMERICAN: >60
GFR NON-AFRICAN AMERICAN: >60 ML/MIN/1.73
GLUCOSE BLD-MCNC: 204 MG/DL (ref 74–99)
HBA1C MFR BLD: 9.7 % (ref 4–5.6)
HCT VFR BLD CALC: 36.8 % (ref 34–48)
HEMOGLOBIN: 11.4 G/DL (ref 11.5–15.5)
IMMATURE GRANULOCYTES #: 0.03 E9/L
IMMATURE GRANULOCYTES %: 0.4 % (ref 0–5)
LYMPHOCYTES ABSOLUTE: 1.53 E9/L (ref 1.5–4)
LYMPHOCYTES RELATIVE PERCENT: 20.5 % (ref 20–42)
MCH RBC QN AUTO: 25.9 PG (ref 26–35)
MCHC RBC AUTO-ENTMCNC: 31 % (ref 32–34.5)
MCV RBC AUTO: 83.4 FL (ref 80–99.9)
METER GLUCOSE: 201 MG/DL (ref 74–99)
MONOCYTES ABSOLUTE: 0.55 E9/L (ref 0.1–0.95)
MONOCYTES RELATIVE PERCENT: 7.4 % (ref 2–12)
NEUTROPHILS ABSOLUTE: 5.2 E9/L (ref 1.8–7.3)
NEUTROPHILS RELATIVE PERCENT: 69.8 % (ref 43–80)
PDW BLD-RTO: 14.5 FL (ref 11.5–15)
PLATELET # BLD: 181 E9/L (ref 130–450)
PMV BLD AUTO: 11.7 FL (ref 7–12)
POTASSIUM REFLEX MAGNESIUM: 4.3 MMOL/L (ref 3.5–5)
RBC # BLD: 4.41 E12/L (ref 3.5–5.5)
SODIUM BLD-SCNC: 142 MMOL/L (ref 132–146)
TOTAL PROTEIN: 6.7 G/DL (ref 6.4–8.3)
TSH SERPL DL<=0.05 MIU/L-ACNC: 0.26 UIU/ML (ref 0.27–4.2)
WBC # BLD: 7.5 E9/L (ref 4.5–11.5)

## 2019-10-21 PROCEDURE — 36415 COLL VENOUS BLD VENIPUNCTURE: CPT

## 2019-10-21 PROCEDURE — 84443 ASSAY THYROID STIM HORMONE: CPT

## 2019-10-21 PROCEDURE — 94660 CPAP INITIATION&MGMT: CPT

## 2019-10-21 PROCEDURE — 80053 COMPREHEN METABOLIC PANEL: CPT

## 2019-10-21 PROCEDURE — 6370000000 HC RX 637 (ALT 250 FOR IP): Performed by: PHYSICIAN ASSISTANT

## 2019-10-21 PROCEDURE — APPSS45 APP SPLIT SHARED TIME 31-45 MINUTES: Performed by: PHYSICIAN ASSISTANT

## 2019-10-21 PROCEDURE — 6360000002 HC RX W HCPCS: Performed by: PHYSICIAN ASSISTANT

## 2019-10-21 PROCEDURE — 2580000003 HC RX 258: Performed by: PHYSICIAN ASSISTANT

## 2019-10-21 PROCEDURE — 85025 COMPLETE CBC W/AUTO DIFF WBC: CPT

## 2019-10-21 PROCEDURE — 94640 AIRWAY INHALATION TREATMENT: CPT

## 2019-10-21 PROCEDURE — 2060000000 HC ICU INTERMEDIATE R&B

## 2019-10-21 PROCEDURE — APPSS30 APP SPLIT SHARED TIME 16-30 MINUTES: Performed by: PHYSICIAN ASSISTANT

## 2019-10-21 PROCEDURE — 99232 SBSQ HOSP IP/OBS MODERATE 35: CPT | Performed by: INTERNAL MEDICINE

## 2019-10-21 PROCEDURE — 99223 1ST HOSP IP/OBS HIGH 75: CPT | Performed by: INTERNAL MEDICINE

## 2019-10-21 PROCEDURE — 2700000000 HC OXYGEN THERAPY PER DAY

## 2019-10-21 PROCEDURE — 82962 GLUCOSE BLOOD TEST: CPT

## 2019-10-21 PROCEDURE — 83036 HEMOGLOBIN GLYCOSYLATED A1C: CPT

## 2019-10-21 PROCEDURE — 6370000000 HC RX 637 (ALT 250 FOR IP): Performed by: INTERNAL MEDICINE

## 2019-10-21 RX ORDER — CARVEDILOL 3.12 MG/1
3.12 TABLET ORAL 2 TIMES DAILY WITH MEALS
Status: DISCONTINUED | OUTPATIENT
Start: 2019-10-21 | End: 2019-10-22

## 2019-10-21 RX ORDER — AMLODIPINE BESYLATE 5 MG/1
5 TABLET ORAL DAILY
Status: DISCONTINUED | OUTPATIENT
Start: 2019-10-22 | End: 2019-10-23

## 2019-10-21 RX ORDER — FUROSEMIDE 10 MG/ML
40 INJECTION INTRAMUSCULAR; INTRAVENOUS DAILY
Status: DISCONTINUED | OUTPATIENT
Start: 2019-10-22 | End: 2019-10-24

## 2019-10-21 RX ADMIN — IPRATROPIUM BROMIDE AND ALBUTEROL SULFATE 1 AMPULE: .5; 3 SOLUTION RESPIRATORY (INHALATION) at 12:26

## 2019-10-21 RX ADMIN — CLONIDINE HYDROCHLORIDE 0.1 MG: 0.1 TABLET ORAL at 22:37

## 2019-10-21 RX ADMIN — Medication 1 TABLET: at 08:46

## 2019-10-21 RX ADMIN — FLUOXETINE 20 MG: 20 CAPSULE ORAL at 08:46

## 2019-10-21 RX ADMIN — Medication 10 ML: at 03:49

## 2019-10-21 RX ADMIN — Medication 10 ML: at 22:38

## 2019-10-21 RX ADMIN — CARVEDILOL 3.12 MG: 3.12 TABLET, FILM COATED ORAL at 10:18

## 2019-10-21 RX ADMIN — DOCUSATE SODIUM 100 MG: 100 CAPSULE, LIQUID FILLED ORAL at 08:46

## 2019-10-21 RX ADMIN — CARVEDILOL 3.12 MG: 3.12 TABLET, FILM COATED ORAL at 16:23

## 2019-10-21 RX ADMIN — CLONIDINE HYDROCHLORIDE 0.1 MG: 0.1 TABLET ORAL at 08:46

## 2019-10-21 RX ADMIN — ENOXAPARIN SODIUM 40 MG: 40 INJECTION SUBCUTANEOUS at 08:45

## 2019-10-21 RX ADMIN — IPRATROPIUM BROMIDE AND ALBUTEROL SULFATE 1 AMPULE: .5; 3 SOLUTION RESPIRATORY (INHALATION) at 19:56

## 2019-10-21 RX ADMIN — INSULIN GLARGINE 22 UNITS: 100 INJECTION, SOLUTION SUBCUTANEOUS at 22:42

## 2019-10-21 RX ADMIN — FUROSEMIDE 20 MG: 10 INJECTION, SOLUTION INTRAVENOUS at 08:46

## 2019-10-21 RX ADMIN — CETIRIZINE HYDROCHLORIDE 10 MG: 10 TABLET, FILM COATED ORAL at 08:46

## 2019-10-21 RX ADMIN — ONDANSETRON 4 MG: 2 INJECTION INTRAMUSCULAR; INTRAVENOUS at 03:49

## 2019-10-21 RX ADMIN — Medication 10 ML: at 08:46

## 2019-10-21 RX ADMIN — AMLODIPINE BESYLATE 10 MG: 10 TABLET ORAL at 08:46

## 2019-10-21 RX ADMIN — IPRATROPIUM BROMIDE AND ALBUTEROL SULFATE 1 AMPULE: .5; 3 SOLUTION RESPIRATORY (INHALATION) at 07:59

## 2019-10-21 RX ADMIN — IPRATROPIUM BROMIDE AND ALBUTEROL SULFATE 1 AMPULE: .5; 3 SOLUTION RESPIRATORY (INHALATION) at 16:08

## 2019-10-21 RX ADMIN — DOCUSATE SODIUM 100 MG: 100 CAPSULE, LIQUID FILLED ORAL at 22:37

## 2019-10-21 RX ADMIN — LOSARTAN POTASSIUM 100 MG: 50 TABLET, FILM COATED ORAL at 08:46

## 2019-10-21 ASSESSMENT — PAIN SCALES - GENERAL
PAINLEVEL_OUTOF10: 0

## 2019-10-22 ENCOUNTER — APPOINTMENT (OUTPATIENT)
Dept: NUCLEAR MEDICINE | Age: 69
DRG: 291 | End: 2019-10-22
Payer: MEDICARE

## 2019-10-22 LAB
ANION GAP SERPL CALCULATED.3IONS-SCNC: 10 MMOL/L (ref 7–16)
BASOPHILS ABSOLUTE: 0.04 E9/L (ref 0–0.2)
BASOPHILS RELATIVE PERCENT: 0.6 % (ref 0–2)
BUN BLDV-MCNC: 33 MG/DL (ref 8–23)
CALCIUM SERPL-MCNC: 8.8 MG/DL (ref 8.6–10.2)
CHLORIDE BLD-SCNC: 102 MMOL/L (ref 98–107)
CO2: 30 MMOL/L (ref 22–29)
CREAT SERPL-MCNC: 1 MG/DL (ref 0.5–1)
EKG ATRIAL RATE: 114 BPM
EKG P AXIS: 47 DEGREES
EKG P-R INTERVAL: 144 MS
EKG Q-T INTERVAL: 362 MS
EKG QRS DURATION: 104 MS
EKG QTC CALCULATION (BAZETT): 498 MS
EKG R AXIS: 47 DEGREES
EKG T AXIS: 67 DEGREES
EKG VENTRICULAR RATE: 114 BPM
EOSINOPHILS ABSOLUTE: 0.35 E9/L (ref 0.05–0.5)
EOSINOPHILS RELATIVE PERCENT: 5 % (ref 0–6)
GFR AFRICAN AMERICAN: >60
GFR NON-AFRICAN AMERICAN: >60 ML/MIN/1.73
GLUCOSE BLD-MCNC: 160 MG/DL (ref 74–99)
HCT VFR BLD CALC: 34.8 % (ref 34–48)
HEMOGLOBIN: 10.9 G/DL (ref 11.5–15.5)
IMMATURE GRANULOCYTES #: 0.02 E9/L
IMMATURE GRANULOCYTES %: 0.3 % (ref 0–5)
LV EF: 33 %
LVEF MODALITY: NORMAL
LYMPHOCYTES ABSOLUTE: 1.73 E9/L (ref 1.5–4)
LYMPHOCYTES RELATIVE PERCENT: 24.9 % (ref 20–42)
MAGNESIUM: 2.1 MG/DL (ref 1.6–2.6)
MCH RBC QN AUTO: 26.1 PG (ref 26–35)
MCHC RBC AUTO-ENTMCNC: 31.3 % (ref 32–34.5)
MCV RBC AUTO: 83.3 FL (ref 80–99.9)
METER GLUCOSE: 261 MG/DL (ref 74–99)
MONOCYTES ABSOLUTE: 0.62 E9/L (ref 0.1–0.95)
MONOCYTES RELATIVE PERCENT: 8.9 % (ref 2–12)
NEUTROPHILS ABSOLUTE: 4.2 E9/L (ref 1.8–7.3)
NEUTROPHILS RELATIVE PERCENT: 60.3 % (ref 43–80)
PDW BLD-RTO: 14.4 FL (ref 11.5–15)
PLATELET # BLD: 167 E9/L (ref 130–450)
PMV BLD AUTO: 12.2 FL (ref 7–12)
POTASSIUM SERPL-SCNC: 4.1 MMOL/L (ref 3.5–5)
RBC # BLD: 4.18 E12/L (ref 3.5–5.5)
SODIUM BLD-SCNC: 142 MMOL/L (ref 132–146)
WBC # BLD: 7 E9/L (ref 4.5–11.5)

## 2019-10-22 PROCEDURE — 93018 CV STRESS TEST I&R ONLY: CPT | Performed by: INTERNAL MEDICINE

## 2019-10-22 PROCEDURE — 99232 SBSQ HOSP IP/OBS MODERATE 35: CPT | Performed by: INTERNAL MEDICINE

## 2019-10-22 PROCEDURE — 82962 GLUCOSE BLOOD TEST: CPT

## 2019-10-22 PROCEDURE — 2580000003 HC RX 258: Performed by: PHYSICIAN ASSISTANT

## 2019-10-22 PROCEDURE — APPSS30 APP SPLIT SHARED TIME 16-30 MINUTES: Performed by: PHYSICIAN ASSISTANT

## 2019-10-22 PROCEDURE — 2700000000 HC OXYGEN THERAPY PER DAY

## 2019-10-22 PROCEDURE — 94660 CPAP INITIATION&MGMT: CPT

## 2019-10-22 PROCEDURE — A9500 TC99M SESTAMIBI: HCPCS | Performed by: RADIOLOGY

## 2019-10-22 PROCEDURE — APPSS30 APP SPLIT SHARED TIME 16-30 MINUTES: Performed by: NURSE PRACTITIONER

## 2019-10-22 PROCEDURE — 80048 BASIC METABOLIC PNL TOTAL CA: CPT

## 2019-10-22 PROCEDURE — 6360000002 HC RX W HCPCS: Performed by: INTERNAL MEDICINE

## 2019-10-22 PROCEDURE — 94640 AIRWAY INHALATION TREATMENT: CPT

## 2019-10-22 PROCEDURE — 83735 ASSAY OF MAGNESIUM: CPT

## 2019-10-22 PROCEDURE — 6370000000 HC RX 637 (ALT 250 FOR IP): Performed by: INTERNAL MEDICINE

## 2019-10-22 PROCEDURE — 2060000000 HC ICU INTERMEDIATE R&B

## 2019-10-22 PROCEDURE — 3430000000 HC RX DIAGNOSTIC RADIOPHARMACEUTICAL: Performed by: RADIOLOGY

## 2019-10-22 PROCEDURE — 36415 COLL VENOUS BLD VENIPUNCTURE: CPT

## 2019-10-22 PROCEDURE — 6370000000 HC RX 637 (ALT 250 FOR IP): Performed by: PHYSICIAN ASSISTANT

## 2019-10-22 PROCEDURE — 93017 CV STRESS TEST TRACING ONLY: CPT

## 2019-10-22 PROCEDURE — 85025 COMPLETE CBC W/AUTO DIFF WBC: CPT

## 2019-10-22 PROCEDURE — 93016 CV STRESS TEST SUPVJ ONLY: CPT | Performed by: INTERNAL MEDICINE

## 2019-10-22 PROCEDURE — 6370000000 HC RX 637 (ALT 250 FOR IP): Performed by: NURSE PRACTITIONER

## 2019-10-22 PROCEDURE — 78452 HT MUSCLE IMAGE SPECT MULT: CPT

## 2019-10-22 PROCEDURE — 6360000002 HC RX W HCPCS: Performed by: PHYSICIAN ASSISTANT

## 2019-10-22 RX ORDER — CARVEDILOL 6.25 MG/1
6.25 TABLET ORAL 2 TIMES DAILY WITH MEALS
Status: DISCONTINUED | OUTPATIENT
Start: 2019-10-22 | End: 2019-10-24 | Stop reason: HOSPADM

## 2019-10-22 RX ORDER — CLONIDINE HYDROCHLORIDE 0.1 MG/1
0.1 TABLET ORAL DAILY
Status: DISCONTINUED | OUTPATIENT
Start: 2019-10-23 | End: 2019-10-23

## 2019-10-22 RX ADMIN — Medication 1 TABLET: at 11:34

## 2019-10-22 RX ADMIN — DOCUSATE SODIUM 100 MG: 100 CAPSULE, LIQUID FILLED ORAL at 11:36

## 2019-10-22 RX ADMIN — ENOXAPARIN SODIUM 40 MG: 40 INJECTION SUBCUTANEOUS at 11:36

## 2019-10-22 RX ADMIN — IPRATROPIUM BROMIDE AND ALBUTEROL SULFATE 1 AMPULE: .5; 3 SOLUTION RESPIRATORY (INHALATION) at 13:03

## 2019-10-22 RX ADMIN — CLONIDINE HYDROCHLORIDE 0.1 MG: 0.1 TABLET ORAL at 11:36

## 2019-10-22 RX ADMIN — Medication 30 MILLICURIE: at 09:00

## 2019-10-22 RX ADMIN — REGADENOSON 0.4 MG: 0.08 INJECTION, SOLUTION INTRAVENOUS at 10:09

## 2019-10-22 RX ADMIN — CARVEDILOL 3.12 MG: 3.12 TABLET, FILM COATED ORAL at 07:46

## 2019-10-22 RX ADMIN — Medication 10 MILLICURIE: at 07:50

## 2019-10-22 RX ADMIN — FLUOXETINE 20 MG: 20 CAPSULE ORAL at 11:35

## 2019-10-22 RX ADMIN — DOCUSATE SODIUM 100 MG: 100 CAPSULE, LIQUID FILLED ORAL at 21:21

## 2019-10-22 RX ADMIN — Medication 10 ML: at 11:37

## 2019-10-22 RX ADMIN — LOSARTAN POTASSIUM 100 MG: 50 TABLET, FILM COATED ORAL at 11:35

## 2019-10-22 RX ADMIN — AMLODIPINE BESYLATE 5 MG: 5 TABLET ORAL at 11:35

## 2019-10-22 RX ADMIN — IPRATROPIUM BROMIDE AND ALBUTEROL SULFATE 1 AMPULE: .5; 3 SOLUTION RESPIRATORY (INHALATION) at 19:37

## 2019-10-22 RX ADMIN — INSULIN GLARGINE 22 UNITS: 100 INJECTION, SOLUTION SUBCUTANEOUS at 21:21

## 2019-10-22 RX ADMIN — Medication 10 ML: at 21:21

## 2019-10-22 RX ADMIN — CARVEDILOL 6.25 MG: 6.25 TABLET, FILM COATED ORAL at 16:05

## 2019-10-22 RX ADMIN — CETIRIZINE HYDROCHLORIDE 10 MG: 10 TABLET, FILM COATED ORAL at 11:35

## 2019-10-22 RX ADMIN — FUROSEMIDE 40 MG: 10 INJECTION, SOLUTION INTRAMUSCULAR; INTRAVENOUS at 11:36

## 2019-10-22 RX ADMIN — IPRATROPIUM BROMIDE AND ALBUTEROL SULFATE 1 AMPULE: .5; 3 SOLUTION RESPIRATORY (INHALATION) at 16:31

## 2019-10-22 ASSESSMENT — PAIN SCALES - GENERAL
PAINLEVEL_OUTOF10: 0

## 2019-10-23 ENCOUNTER — HOSPITAL ENCOUNTER (OUTPATIENT)
Age: 69
Discharge: HOME | End: 2019-10-23
Payer: MEDICARE

## 2019-10-23 ENCOUNTER — HOSPITAL ENCOUNTER (OUTPATIENT)
Dept: CARDIAC CATH/INVASIVE PROCEDURES | Age: 69
Discharge: HOME | End: 2019-10-23
Payer: MEDICARE

## 2019-10-23 LAB
ABO/RH: NORMAL
ABO/RH: NORMAL
ANION GAP SERPL CALCULATED.3IONS-SCNC: 10 MMOL/L (ref 7–16)
ANTIBODY SCREEN: NORMAL
ANTIBODY SCREEN: NORMAL
BASOPHILS ABSOLUTE: 0.04 E9/L (ref 0–0.2)
BASOPHILS RELATIVE PERCENT: 0.6 % (ref 0–2)
BUN BLDV-MCNC: 31 MG/DL (ref 8–23)
CALCIUM SERPL-MCNC: 8.8 MG/DL (ref 8.6–10.2)
CHLORIDE BLD-SCNC: 100 MMOL/L (ref 98–107)
CO2: 29 MMOL/L (ref 22–29)
CREAT SERPL-MCNC: 1 MG/DL (ref 0.5–1)
EOSINOPHILS ABSOLUTE: 0.27 E9/L (ref 0.05–0.5)
EOSINOPHILS RELATIVE PERCENT: 4.4 % (ref 0–6)
GFR AFRICAN AMERICAN: >60
GFR NON-AFRICAN AMERICAN: >60 ML/MIN/1.73
GLUCOSE BLD-MCNC: 124 MG/DL (ref 74–99)
HCT VFR BLD CALC: 36.2 % (ref 34–48)
HEMOGLOBIN: 11.3 G/DL (ref 11.5–15.5)
IMMATURE GRANULOCYTES #: 0.02 E9/L
IMMATURE GRANULOCYTES %: 0.3 % (ref 0–5)
INR BLD: 1.1
LYMPHOCYTES ABSOLUTE: 1.57 E9/L (ref 1.5–4)
LYMPHOCYTES RELATIVE PERCENT: 25.4 % (ref 20–42)
MCH RBC QN AUTO: 25.7 PG (ref 26–35)
MCHC RBC AUTO-ENTMCNC: 31.2 % (ref 32–34.5)
MCV RBC AUTO: 82.3 FL (ref 80–99.9)
METER GLUCOSE: 176 MG/DL (ref 74–99)
MONOCYTES ABSOLUTE: 0.5 E9/L (ref 0.1–0.95)
MONOCYTES RELATIVE PERCENT: 8.1 % (ref 2–12)
NEUTROPHILS ABSOLUTE: 3.79 E9/L (ref 1.8–7.3)
NEUTROPHILS RELATIVE PERCENT: 61.2 % (ref 43–80)
PDW BLD-RTO: 14.3 FL (ref 11.5–15)
PLATELET # BLD: 170 E9/L (ref 130–450)
PMV BLD AUTO: 11.2 FL (ref 7–12)
POTASSIUM SERPL-SCNC: 3.9 MMOL/L (ref 3.5–5)
PROTHROMBIN TIME: 13.3 SEC (ref 9.3–12.4)
RBC # BLD: 4.4 E12/L (ref 3.5–5.5)
SODIUM BLD-SCNC: 139 MMOL/L (ref 132–146)
WBC # BLD: 6.2 E9/L (ref 4.5–11.5)

## 2019-10-23 PROCEDURE — 99024 POSTOP FOLLOW-UP VISIT: CPT | Performed by: PHYSICIAN ASSISTANT

## 2019-10-23 PROCEDURE — 36415 COLL VENOUS BLD VENIPUNCTURE: CPT

## 2019-10-23 PROCEDURE — 6370000000 HC RX 637 (ALT 250 FOR IP): Performed by: PHYSICIAN ASSISTANT

## 2019-10-23 PROCEDURE — 93458 L HRT ARTERY/VENTRICLE ANGIO: CPT | Performed by: INTERNAL MEDICINE

## 2019-10-23 PROCEDURE — 85610 PROTHROMBIN TIME: CPT

## 2019-10-23 PROCEDURE — 85025 COMPLETE CBC W/AUTO DIFF WBC: CPT

## 2019-10-23 PROCEDURE — 86850 RBC ANTIBODY SCREEN: CPT

## 2019-10-23 PROCEDURE — C1894 INTRO/SHEATH, NON-LASER: HCPCS

## 2019-10-23 PROCEDURE — 2060000000 HC ICU INTERMEDIATE R&B

## 2019-10-23 PROCEDURE — 82962 GLUCOSE BLOOD TEST: CPT

## 2019-10-23 PROCEDURE — 6370000000 HC RX 637 (ALT 250 FOR IP)

## 2019-10-23 PROCEDURE — A0426 ALS 1: HCPCS

## 2019-10-23 PROCEDURE — 99232 SBSQ HOSP IP/OBS MODERATE 35: CPT | Performed by: INTERNAL MEDICINE

## 2019-10-23 PROCEDURE — 86901 BLOOD TYPING SEROLOGIC RH(D): CPT

## 2019-10-23 PROCEDURE — 86900 BLOOD TYPING SEROLOGIC ABO: CPT

## 2019-10-23 PROCEDURE — 94660 CPAP INITIATION&MGMT: CPT

## 2019-10-23 PROCEDURE — 6370000000 HC RX 637 (ALT 250 FOR IP): Performed by: INTERNAL MEDICINE

## 2019-10-23 PROCEDURE — 2580000003 HC RX 258: Performed by: INTERNAL MEDICINE

## 2019-10-23 PROCEDURE — APPSS30 APP SPLIT SHARED TIME 16-30 MINUTES: Performed by: PHYSICIAN ASSISTANT

## 2019-10-23 PROCEDURE — 2500000003 HC RX 250 WO HCPCS

## 2019-10-23 PROCEDURE — A0425 GROUND MILEAGE: HCPCS

## 2019-10-23 PROCEDURE — 6370000000 HC RX 637 (ALT 250 FOR IP): Performed by: NURSE PRACTITIONER

## 2019-10-23 PROCEDURE — C1887 CATHETER, GUIDING: HCPCS

## 2019-10-23 PROCEDURE — 6360000002 HC RX W HCPCS

## 2019-10-23 PROCEDURE — 94640 AIRWAY INHALATION TREATMENT: CPT

## 2019-10-23 PROCEDURE — 6360000002 HC RX W HCPCS: Performed by: INTERNAL MEDICINE

## 2019-10-23 PROCEDURE — 2709999900 HC NON-CHARGEABLE SUPPLY

## 2019-10-23 PROCEDURE — 2580000003 HC RX 258: Performed by: PHYSICIAN ASSISTANT

## 2019-10-23 PROCEDURE — 80048 BASIC METABOLIC PNL TOTAL CA: CPT

## 2019-10-23 PROCEDURE — C1769 GUIDE WIRE: HCPCS

## 2019-10-23 RX ORDER — ACETAMINOPHEN 325 MG/1
650 TABLET ORAL EVERY 4 HOURS PRN
Status: DISCONTINUED | OUTPATIENT
Start: 2019-10-23 | End: 2019-10-24 | Stop reason: HOSPADM

## 2019-10-23 RX ORDER — SPIRONOLACTONE 25 MG/1
12.5 TABLET ORAL DAILY
Status: DISCONTINUED | OUTPATIENT
Start: 2019-10-24 | End: 2019-10-24 | Stop reason: HOSPADM

## 2019-10-23 RX ORDER — LISINOPRIL 5 MG/1
5 TABLET ORAL DAILY
Status: DISCONTINUED | OUTPATIENT
Start: 2019-10-24 | End: 2019-10-24 | Stop reason: HOSPADM

## 2019-10-23 RX ORDER — SODIUM CHLORIDE 9 MG/ML
INJECTION, SOLUTION INTRAVENOUS ONCE
Status: COMPLETED | OUTPATIENT
Start: 2019-10-23 | End: 2019-10-23

## 2019-10-23 RX ADMIN — Medication 10 ML: at 08:07

## 2019-10-23 RX ADMIN — INSULIN GLARGINE 22 UNITS: 100 INJECTION, SOLUTION SUBCUTANEOUS at 21:29

## 2019-10-23 RX ADMIN — IPRATROPIUM BROMIDE AND ALBUTEROL SULFATE 1 AMPULE: .5; 3 SOLUTION RESPIRATORY (INHALATION) at 08:36

## 2019-10-23 RX ADMIN — AMLODIPINE BESYLATE 5 MG: 5 TABLET ORAL at 08:07

## 2019-10-23 RX ADMIN — SODIUM CHLORIDE: 9 INJECTION, SOLUTION INTRAVENOUS at 09:55

## 2019-10-23 RX ADMIN — DOCUSATE SODIUM 100 MG: 100 CAPSULE, LIQUID FILLED ORAL at 21:29

## 2019-10-23 RX ADMIN — CETIRIZINE HYDROCHLORIDE 10 MG: 10 TABLET, FILM COATED ORAL at 08:07

## 2019-10-23 RX ADMIN — Medication 10 ML: at 21:29

## 2019-10-23 RX ADMIN — Medication 1 TABLET: at 08:07

## 2019-10-23 RX ADMIN — CARVEDILOL 6.25 MG: 6.25 TABLET, FILM COATED ORAL at 17:16

## 2019-10-23 RX ADMIN — IPRATROPIUM BROMIDE AND ALBUTEROL SULFATE 1 AMPULE: .5; 3 SOLUTION RESPIRATORY (INHALATION) at 18:21

## 2019-10-23 RX ADMIN — FLUOXETINE 20 MG: 20 CAPSULE ORAL at 08:07

## 2019-10-23 RX ADMIN — DOCUSATE SODIUM 100 MG: 100 CAPSULE, LIQUID FILLED ORAL at 08:07

## 2019-10-23 RX ADMIN — CLONIDINE HYDROCHLORIDE 0.1 MG: 0.1 TABLET ORAL at 08:07

## 2019-10-23 RX ADMIN — FUROSEMIDE 40 MG: 10 INJECTION, SOLUTION INTRAMUSCULAR; INTRAVENOUS at 08:06

## 2019-10-23 RX ADMIN — SODIUM CHLORIDE: 9 INJECTION, SOLUTION INTRAVENOUS at 09:54

## 2019-10-23 RX ADMIN — IPRATROPIUM BROMIDE AND ALBUTEROL SULFATE 1 AMPULE: .5; 3 SOLUTION RESPIRATORY (INHALATION) at 21:55

## 2019-10-23 RX ADMIN — CARVEDILOL 6.25 MG: 6.25 TABLET, FILM COATED ORAL at 08:07

## 2019-10-23 ASSESSMENT — PAIN SCALES - GENERAL
PAINLEVEL_OUTOF10: 0

## 2019-10-24 VITALS
OXYGEN SATURATION: 95 % | RESPIRATION RATE: 16 BRPM | TEMPERATURE: 98.2 F | HEIGHT: 63 IN | WEIGHT: 180 LBS | SYSTOLIC BLOOD PRESSURE: 128 MMHG | BODY MASS INDEX: 31.89 KG/M2 | DIASTOLIC BLOOD PRESSURE: 65 MMHG | HEART RATE: 68 BPM

## 2019-10-24 LAB
ANION GAP SERPL CALCULATED.3IONS-SCNC: 10 MMOL/L (ref 7–16)
BASOPHILS ABSOLUTE: 0.03 E9/L (ref 0–0.2)
BASOPHILS RELATIVE PERCENT: 0.4 % (ref 0–2)
BUN BLDV-MCNC: 27 MG/DL (ref 8–23)
CALCIUM SERPL-MCNC: 9 MG/DL (ref 8.6–10.2)
CHLORIDE BLD-SCNC: 99 MMOL/L (ref 98–107)
CO2: 28 MMOL/L (ref 22–29)
CREAT SERPL-MCNC: 0.9 MG/DL (ref 0.5–1)
EOSINOPHILS ABSOLUTE: 0.3 E9/L (ref 0.05–0.5)
EOSINOPHILS RELATIVE PERCENT: 4.4 % (ref 0–6)
GFR AFRICAN AMERICAN: >60
GFR NON-AFRICAN AMERICAN: >60 ML/MIN/1.73
GLUCOSE BLD-MCNC: 110 MG/DL (ref 74–99)
HCT VFR BLD CALC: 35.4 % (ref 34–48)
HEMOGLOBIN: 11.1 G/DL (ref 11.5–15.5)
IMMATURE GRANULOCYTES #: 0.01 E9/L
IMMATURE GRANULOCYTES %: 0.1 % (ref 0–5)
INR BLD: 1.2
LYMPHOCYTES ABSOLUTE: 1.68 E9/L (ref 1.5–4)
LYMPHOCYTES RELATIVE PERCENT: 24.5 % (ref 20–42)
MCH RBC QN AUTO: 25.6 PG (ref 26–35)
MCHC RBC AUTO-ENTMCNC: 31.4 % (ref 32–34.5)
MCV RBC AUTO: 81.8 FL (ref 80–99.9)
MONOCYTES ABSOLUTE: 0.67 E9/L (ref 0.1–0.95)
MONOCYTES RELATIVE PERCENT: 9.8 % (ref 2–12)
NEUTROPHILS ABSOLUTE: 4.18 E9/L (ref 1.8–7.3)
NEUTROPHILS RELATIVE PERCENT: 60.8 % (ref 43–80)
PDW BLD-RTO: 14.2 FL (ref 11.5–15)
PLATELET # BLD: 184 E9/L (ref 130–450)
PMV BLD AUTO: 11.2 FL (ref 7–12)
POTASSIUM SERPL-SCNC: 4 MMOL/L (ref 3.5–5)
PROTHROMBIN TIME: 14.1 SEC (ref 9.3–12.4)
RBC # BLD: 4.33 E12/L (ref 3.5–5.5)
SODIUM BLD-SCNC: 137 MMOL/L (ref 132–146)
WBC # BLD: 6.9 E9/L (ref 4.5–11.5)

## 2019-10-24 PROCEDURE — 6370000000 HC RX 637 (ALT 250 FOR IP): Performed by: INTERNAL MEDICINE

## 2019-10-24 PROCEDURE — 6370000000 HC RX 637 (ALT 250 FOR IP): Performed by: PHYSICIAN ASSISTANT

## 2019-10-24 PROCEDURE — 6370000000 HC RX 637 (ALT 250 FOR IP): Performed by: NURSE PRACTITIONER

## 2019-10-24 PROCEDURE — 85025 COMPLETE CBC W/AUTO DIFF WBC: CPT

## 2019-10-24 PROCEDURE — APPSS45 APP SPLIT SHARED TIME 31-45 MINUTES: Performed by: PHYSICIAN ASSISTANT

## 2019-10-24 PROCEDURE — 94660 CPAP INITIATION&MGMT: CPT

## 2019-10-24 PROCEDURE — 99231 SBSQ HOSP IP/OBS SF/LOW 25: CPT | Performed by: INTERNAL MEDICINE

## 2019-10-24 PROCEDURE — 99239 HOSP IP/OBS DSCHRG MGMT >30: CPT | Performed by: INTERNAL MEDICINE

## 2019-10-24 PROCEDURE — 80048 BASIC METABOLIC PNL TOTAL CA: CPT

## 2019-10-24 PROCEDURE — 6360000002 HC RX W HCPCS: Performed by: PHYSICIAN ASSISTANT

## 2019-10-24 PROCEDURE — 6360000002 HC RX W HCPCS: Performed by: INTERNAL MEDICINE

## 2019-10-24 PROCEDURE — 85610 PROTHROMBIN TIME: CPT

## 2019-10-24 PROCEDURE — 94640 AIRWAY INHALATION TREATMENT: CPT

## 2019-10-24 PROCEDURE — 2580000003 HC RX 258: Performed by: PHYSICIAN ASSISTANT

## 2019-10-24 PROCEDURE — 36415 COLL VENOUS BLD VENIPUNCTURE: CPT

## 2019-10-24 RX ORDER — SPIRONOLACTONE 25 MG/1
12.5 TABLET ORAL DAILY
Qty: 30 TABLET | Refills: 0 | Status: CANCELLED | OUTPATIENT
Start: 2019-10-25

## 2019-10-24 RX ORDER — FUROSEMIDE 40 MG/1
40 TABLET ORAL DAILY
Qty: 60 TABLET | Refills: 0 | Status: SHIPPED | OUTPATIENT
Start: 2019-10-25 | End: 2019-11-21 | Stop reason: SDUPTHER

## 2019-10-24 RX ORDER — LISINOPRIL 5 MG/1
5 TABLET ORAL DAILY
Qty: 30 TABLET | Refills: 0 | Status: SHIPPED | OUTPATIENT
Start: 2019-10-25 | End: 2019-11-21 | Stop reason: SDUPTHER

## 2019-10-24 RX ORDER — FUROSEMIDE 40 MG/1
40 TABLET ORAL DAILY
Status: DISCONTINUED | OUTPATIENT
Start: 2019-10-25 | End: 2019-10-24 | Stop reason: HOSPADM

## 2019-10-24 RX ORDER — SPIRONOLACTONE 25 MG/1
12.5 TABLET ORAL DAILY
Qty: 30 TABLET | Refills: 0 | Status: SHIPPED | OUTPATIENT
Start: 2019-10-25 | End: 2020-01-02 | Stop reason: SDUPTHER

## 2019-10-24 RX ORDER — FUROSEMIDE 40 MG/1
40 TABLET ORAL DAILY
Qty: 60 TABLET | Refills: 0 | Status: CANCELLED | OUTPATIENT
Start: 2019-10-25

## 2019-10-24 RX ORDER — LISINOPRIL 5 MG/1
5 TABLET ORAL DAILY
Qty: 30 TABLET | Refills: 0 | Status: CANCELLED | OUTPATIENT
Start: 2019-10-25

## 2019-10-24 RX ORDER — CARVEDILOL 6.25 MG/1
6.25 TABLET ORAL 2 TIMES DAILY WITH MEALS
Qty: 60 TABLET | Refills: 0 | Status: SHIPPED | OUTPATIENT
Start: 2019-10-24 | End: 2019-11-07 | Stop reason: SDUPTHER

## 2019-10-24 RX ORDER — CARVEDILOL 6.25 MG/1
6.25 TABLET ORAL 2 TIMES DAILY WITH MEALS
Qty: 60 TABLET | Refills: 0 | Status: CANCELLED | OUTPATIENT
Start: 2019-10-24

## 2019-10-24 RX ADMIN — IPRATROPIUM BROMIDE AND ALBUTEROL SULFATE 1 AMPULE: .5; 3 SOLUTION RESPIRATORY (INHALATION) at 08:48

## 2019-10-24 RX ADMIN — ACETAMINOPHEN 650 MG: 325 TABLET ORAL at 14:30

## 2019-10-24 RX ADMIN — Medication 10 ML: at 08:37

## 2019-10-24 RX ADMIN — CARVEDILOL 6.25 MG: 6.25 TABLET, FILM COATED ORAL at 08:37

## 2019-10-24 RX ADMIN — LISINOPRIL 5 MG: 5 TABLET ORAL at 08:38

## 2019-10-24 RX ADMIN — DOCUSATE SODIUM 100 MG: 100 CAPSULE, LIQUID FILLED ORAL at 08:38

## 2019-10-24 RX ADMIN — ENOXAPARIN SODIUM 40 MG: 40 INJECTION SUBCUTANEOUS at 08:38

## 2019-10-24 RX ADMIN — FLUOXETINE 20 MG: 20 CAPSULE ORAL at 08:38

## 2019-10-24 RX ADMIN — FUROSEMIDE 40 MG: 10 INJECTION, SOLUTION INTRAMUSCULAR; INTRAVENOUS at 08:37

## 2019-10-24 RX ADMIN — Medication 1 TABLET: at 08:38

## 2019-10-24 RX ADMIN — CETIRIZINE HYDROCHLORIDE 10 MG: 10 TABLET, FILM COATED ORAL at 08:38

## 2019-10-24 RX ADMIN — SPIRONOLACTONE 12.5 MG: 25 TABLET ORAL at 08:37

## 2019-10-24 RX ADMIN — IPRATROPIUM BROMIDE AND ALBUTEROL SULFATE 1 AMPULE: .5; 3 SOLUTION RESPIRATORY (INHALATION) at 17:16

## 2019-10-24 RX ADMIN — IPRATROPIUM BROMIDE AND ALBUTEROL SULFATE 1 AMPULE: .5; 3 SOLUTION RESPIRATORY (INHALATION) at 12:20

## 2019-10-24 ASSESSMENT — PAIN SCALES - GENERAL
PAINLEVEL_OUTOF10: 0
PAINLEVEL_OUTOF10: 0
PAINLEVEL_OUTOF10: 8

## 2019-10-24 ASSESSMENT — PAIN DESCRIPTION - LOCATION: LOCATION: HEAD

## 2019-10-24 ASSESSMENT — PAIN DESCRIPTION - FREQUENCY: FREQUENCY: INTERMITTENT

## 2019-10-24 ASSESSMENT — PAIN DESCRIPTION - PAIN TYPE: TYPE: ACUTE PAIN

## 2019-10-24 ASSESSMENT — PAIN DESCRIPTION - ONSET: ONSET: GRADUAL

## 2019-10-24 ASSESSMENT — PAIN DESCRIPTION - PROGRESSION: CLINICAL_PROGRESSION: GRADUALLY WORSENING

## 2019-10-24 ASSESSMENT — PAIN - FUNCTIONAL ASSESSMENT: PAIN_FUNCTIONAL_ASSESSMENT: ACTIVITIES ARE NOT PREVENTED

## 2019-10-24 ASSESSMENT — PAIN DESCRIPTION - DESCRIPTORS: DESCRIPTORS: HEADACHE

## 2019-10-25 ENCOUNTER — TELEPHONE (OUTPATIENT)
Dept: CARDIOLOGY CLINIC | Age: 69
End: 2019-10-25

## 2019-10-25 DIAGNOSIS — I25.10 CORONARY ARTERY DISEASE INVOLVING NATIVE CORONARY ARTERY OF NATIVE HEART WITHOUT ANGINA PECTORIS: Primary | ICD-10-CM

## 2019-11-06 ENCOUNTER — HOSPITAL ENCOUNTER (OUTPATIENT)
Age: 69
Discharge: HOME | End: 2019-11-06
Payer: MEDICARE

## 2019-11-06 DIAGNOSIS — I25.10 CORONARY ARTERY DISEASE INVOLVING NATIVE CORONARY ARTERY OF NATIVE HEART WITHOUT ANGINA PECTORIS: ICD-10-CM

## 2019-11-06 LAB
ANION GAP SERPL CALCULATED.3IONS-SCNC: 11 MMOL/L (ref 7–16)
BUN BLDV-MCNC: 25 MG/DL (ref 8–23)
CALCIUM SERPL-MCNC: 9.3 MG/DL (ref 8.6–10.2)
CHLORIDE BLD-SCNC: 99 MMOL/L (ref 98–107)
CO2: 29 MMOL/L (ref 22–29)
CREAT SERPL-MCNC: 1.1 MG/DL (ref 0.5–1)
GFR AFRICAN AMERICAN: 59
GFR NON-AFRICAN AMERICAN: 59 ML/MIN/1.73
GLUCOSE BLD-MCNC: 257 MG/DL (ref 74–99)
POTASSIUM SERPL-SCNC: 4.4 MMOL/L (ref 3.5–5)
SODIUM BLD-SCNC: 139 MMOL/L (ref 132–146)

## 2019-11-06 PROCEDURE — 80048 BASIC METABOLIC PNL TOTAL CA: CPT

## 2019-11-06 PROCEDURE — 36415 COLL VENOUS BLD VENIPUNCTURE: CPT

## 2019-11-07 ENCOUNTER — OFFICE VISIT (OUTPATIENT)
Dept: CARDIOLOGY CLINIC | Age: 69
End: 2019-11-07
Payer: MEDICARE

## 2019-11-07 VITALS
BODY MASS INDEX: 32.32 KG/M2 | SYSTOLIC BLOOD PRESSURE: 138 MMHG | WEIGHT: 182.4 LBS | DIASTOLIC BLOOD PRESSURE: 78 MMHG | RESPIRATION RATE: 16 BRPM | HEIGHT: 63 IN | HEART RATE: 84 BPM

## 2019-11-07 DIAGNOSIS — I25.10 CORONARY ARTERY DISEASE INVOLVING NATIVE CORONARY ARTERY OF NATIVE HEART WITHOUT ANGINA PECTORIS: Primary | ICD-10-CM

## 2019-11-07 PROCEDURE — 4040F PNEUMOC VAC/ADMIN/RCVD: CPT | Performed by: INTERNAL MEDICINE

## 2019-11-07 PROCEDURE — 1090F PRES/ABSN URINE INCON ASSESS: CPT | Performed by: INTERNAL MEDICINE

## 2019-11-07 PROCEDURE — 3017F COLORECTAL CA SCREEN DOC REV: CPT | Performed by: INTERNAL MEDICINE

## 2019-11-07 PROCEDURE — G8598 ASA/ANTIPLAT THER USED: HCPCS | Performed by: INTERNAL MEDICINE

## 2019-11-07 PROCEDURE — G8484 FLU IMMUNIZE NO ADMIN: HCPCS | Performed by: INTERNAL MEDICINE

## 2019-11-07 PROCEDURE — 93000 ELECTROCARDIOGRAM COMPLETE: CPT | Performed by: INTERNAL MEDICINE

## 2019-11-07 PROCEDURE — 99213 OFFICE O/P EST LOW 20 MIN: CPT | Performed by: INTERNAL MEDICINE

## 2019-11-07 PROCEDURE — 1036F TOBACCO NON-USER: CPT | Performed by: INTERNAL MEDICINE

## 2019-11-07 PROCEDURE — G8427 DOCREV CUR MEDS BY ELIG CLIN: HCPCS | Performed by: INTERNAL MEDICINE

## 2019-11-07 PROCEDURE — 1123F ACP DISCUSS/DSCN MKR DOCD: CPT | Performed by: INTERNAL MEDICINE

## 2019-11-07 PROCEDURE — G8417 CALC BMI ABV UP PARAM F/U: HCPCS | Performed by: INTERNAL MEDICINE

## 2019-11-07 PROCEDURE — G8400 PT W/DXA NO RESULTS DOC: HCPCS | Performed by: INTERNAL MEDICINE

## 2019-11-07 PROCEDURE — 1111F DSCHRG MED/CURRENT MED MERGE: CPT | Performed by: INTERNAL MEDICINE

## 2019-11-07 RX ORDER — CARVEDILOL 12.5 MG/1
12.5 TABLET ORAL 2 TIMES DAILY WITH MEALS
Qty: 60 TABLET | Refills: 5 | Status: SHIPPED | OUTPATIENT
Start: 2019-11-07 | End: 2019-11-21 | Stop reason: SDUPTHER

## 2019-11-21 ENCOUNTER — OFFICE VISIT (OUTPATIENT)
Dept: CARDIOLOGY CLINIC | Age: 69
End: 2019-11-21
Payer: MEDICARE

## 2019-11-21 VITALS
RESPIRATION RATE: 16 BRPM | SYSTOLIC BLOOD PRESSURE: 114 MMHG | HEART RATE: 77 BPM | DIASTOLIC BLOOD PRESSURE: 78 MMHG | HEIGHT: 63 IN | BODY MASS INDEX: 32.89 KG/M2 | WEIGHT: 185.6 LBS

## 2019-11-21 DIAGNOSIS — I50.21 ACUTE SYSTOLIC CHF (CONGESTIVE HEART FAILURE) (HCC): Primary | ICD-10-CM

## 2019-11-21 PROCEDURE — G8598 ASA/ANTIPLAT THER USED: HCPCS | Performed by: INTERNAL MEDICINE

## 2019-11-21 PROCEDURE — 3017F COLORECTAL CA SCREEN DOC REV: CPT | Performed by: INTERNAL MEDICINE

## 2019-11-21 PROCEDURE — G8417 CALC BMI ABV UP PARAM F/U: HCPCS | Performed by: INTERNAL MEDICINE

## 2019-11-21 PROCEDURE — 1123F ACP DISCUSS/DSCN MKR DOCD: CPT | Performed by: INTERNAL MEDICINE

## 2019-11-21 PROCEDURE — 1090F PRES/ABSN URINE INCON ASSESS: CPT | Performed by: INTERNAL MEDICINE

## 2019-11-21 PROCEDURE — G8400 PT W/DXA NO RESULTS DOC: HCPCS | Performed by: INTERNAL MEDICINE

## 2019-11-21 PROCEDURE — 99213 OFFICE O/P EST LOW 20 MIN: CPT | Performed by: INTERNAL MEDICINE

## 2019-11-21 PROCEDURE — 93000 ELECTROCARDIOGRAM COMPLETE: CPT | Performed by: INTERNAL MEDICINE

## 2019-11-21 PROCEDURE — G8427 DOCREV CUR MEDS BY ELIG CLIN: HCPCS | Performed by: INTERNAL MEDICINE

## 2019-11-21 PROCEDURE — 1111F DSCHRG MED/CURRENT MED MERGE: CPT | Performed by: INTERNAL MEDICINE

## 2019-11-21 PROCEDURE — G8484 FLU IMMUNIZE NO ADMIN: HCPCS | Performed by: INTERNAL MEDICINE

## 2019-11-21 PROCEDURE — 4040F PNEUMOC VAC/ADMIN/RCVD: CPT | Performed by: INTERNAL MEDICINE

## 2019-11-21 PROCEDURE — 1036F TOBACCO NON-USER: CPT | Performed by: INTERNAL MEDICINE

## 2019-11-21 RX ORDER — LISINOPRIL 5 MG/1
5 TABLET ORAL DAILY
Qty: 30 TABLET | Refills: 5 | Status: SHIPPED
Start: 2019-11-21 | End: 2020-02-25 | Stop reason: ALTCHOICE

## 2019-11-21 RX ORDER — FUROSEMIDE 40 MG/1
40 TABLET ORAL DAILY
Qty: 60 TABLET | Refills: 5 | Status: SHIPPED
Start: 2019-11-21 | End: 2020-12-07

## 2019-11-21 RX ORDER — CARVEDILOL 25 MG/1
25 TABLET ORAL 2 TIMES DAILY WITH MEALS
Qty: 60 TABLET | Refills: 5 | Status: SHIPPED
Start: 2019-11-21 | End: 2020-07-21 | Stop reason: SDUPTHER

## 2019-12-12 ENCOUNTER — HOSPITAL ENCOUNTER (OUTPATIENT)
Age: 69
Discharge: HOME | End: 2019-12-14
Payer: MEDICARE

## 2019-12-12 ENCOUNTER — OFFICE VISIT (OUTPATIENT)
Dept: CARDIOLOGY CLINIC | Age: 69
End: 2019-12-12
Payer: MEDICARE

## 2019-12-12 VITALS
HEART RATE: 80 BPM | WEIGHT: 186 LBS | DIASTOLIC BLOOD PRESSURE: 78 MMHG | HEIGHT: 64 IN | BODY MASS INDEX: 31.76 KG/M2 | RESPIRATION RATE: 16 BRPM | SYSTOLIC BLOOD PRESSURE: 120 MMHG

## 2019-12-12 DIAGNOSIS — I25.10 CORONARY ARTERY DISEASE INVOLVING NATIVE CORONARY ARTERY OF NATIVE HEART WITHOUT ANGINA PECTORIS: ICD-10-CM

## 2019-12-12 DIAGNOSIS — I25.10 CORONARY ARTERY DISEASE INVOLVING NATIVE CORONARY ARTERY OF NATIVE HEART WITHOUT ANGINA PECTORIS: Primary | ICD-10-CM

## 2019-12-12 LAB
ANION GAP SERPL CALCULATED.3IONS-SCNC: 17 MMOL/L (ref 7–16)
BUN BLDV-MCNC: 37 MG/DL (ref 8–23)
CALCIUM SERPL-MCNC: 9.1 MG/DL (ref 8.6–10.2)
CHLORIDE BLD-SCNC: 101 MMOL/L (ref 98–107)
CO2: 21 MMOL/L (ref 22–29)
CREAT SERPL-MCNC: 1.3 MG/DL (ref 0.5–1)
GFR AFRICAN AMERICAN: 49
GFR NON-AFRICAN AMERICAN: 49 ML/MIN/1.73
GLUCOSE BLD-MCNC: 252 MG/DL (ref 74–99)
POTASSIUM SERPL-SCNC: 4 MMOL/L (ref 3.5–5)
PRO-BNP: 1159 PG/ML (ref 0–125)
SODIUM BLD-SCNC: 139 MMOL/L (ref 132–146)

## 2019-12-12 PROCEDURE — 1090F PRES/ABSN URINE INCON ASSESS: CPT | Performed by: INTERNAL MEDICINE

## 2019-12-12 PROCEDURE — 1123F ACP DISCUSS/DSCN MKR DOCD: CPT | Performed by: INTERNAL MEDICINE

## 2019-12-12 PROCEDURE — G8400 PT W/DXA NO RESULTS DOC: HCPCS | Performed by: INTERNAL MEDICINE

## 2019-12-12 PROCEDURE — 1036F TOBACCO NON-USER: CPT | Performed by: INTERNAL MEDICINE

## 2019-12-12 PROCEDURE — 93000 ELECTROCARDIOGRAM COMPLETE: CPT | Performed by: INTERNAL MEDICINE

## 2019-12-12 PROCEDURE — 3017F COLORECTAL CA SCREEN DOC REV: CPT | Performed by: INTERNAL MEDICINE

## 2019-12-12 PROCEDURE — G8427 DOCREV CUR MEDS BY ELIG CLIN: HCPCS | Performed by: INTERNAL MEDICINE

## 2019-12-12 PROCEDURE — 80048 BASIC METABOLIC PNL TOTAL CA: CPT

## 2019-12-12 PROCEDURE — G8417 CALC BMI ABV UP PARAM F/U: HCPCS | Performed by: INTERNAL MEDICINE

## 2019-12-12 PROCEDURE — 83880 ASSAY OF NATRIURETIC PEPTIDE: CPT

## 2019-12-12 PROCEDURE — 4040F PNEUMOC VAC/ADMIN/RCVD: CPT | Performed by: INTERNAL MEDICINE

## 2019-12-12 PROCEDURE — 99213 OFFICE O/P EST LOW 20 MIN: CPT | Performed by: INTERNAL MEDICINE

## 2019-12-12 PROCEDURE — G8484 FLU IMMUNIZE NO ADMIN: HCPCS | Performed by: INTERNAL MEDICINE

## 2019-12-12 PROCEDURE — G8598 ASA/ANTIPLAT THER USED: HCPCS | Performed by: INTERNAL MEDICINE

## 2020-01-02 RX ORDER — SPIRONOLACTONE 25 MG/1
12.5 TABLET ORAL DAILY
Qty: 30 TABLET | Refills: 5 | Status: SHIPPED
Start: 2020-01-02 | End: 2020-10-01 | Stop reason: SDUPTHER

## 2020-01-13 NOTE — PROGRESS NOTES
[facility]  Lo Martinez. Lito Oviedo M.D. NELLY Kc M.D. Ramos Raymond M.D. Kayode Anderson M.D. Goyo Bhatia MD                Adela [place]   1950  Dorian DO Leelee      This 68-year-old woman is seen for outpatient cardiac follow-up today. She has a history of nonischemic dilated cardiomyopathy and has been undergoing upward titration of medications per guidelines. She has a wearable cardiac defibrillator. She EF feels well and states that she can go about all of her normal activities without any cardiac limitation. Medical History:  1. Admitted 10/20 2019 via ED for dyspnea and fatigue   present for 24/36 hours   ED /119.  Sinus tachycardia 114.  Chest CT \"groundglass\".  ProBNP 2832.  Troponin normal.  D-dimer 9461.  Prolactin 0.9.  2. HTN  3. T2DM (A1C 9.7)  4. Asthma  5. Fibromyalgia. 6. Iron deficiency anemia (prn iron infusions). 7. Depression. 8. Echo  LVEF = 25%. 10/20/2019  9. Cardiac catheterization 10/23/2019 showed no hemodynamically significant coronary artery disease. 10. Wearable cardio defibrillator applied 10/24/2019. 11. Outpatient cardiac follow-up, 11/07/2019. Coreg increased to 12.5 twice daily. 12. Outpatient cardiac follow-up, 11/20/2019. Coreg increased to 25 mg twice daily.   13. Labs 12/12/2019: BUN= 37.  Creatinine= 1.3.  K=4.0.  proBNP= 1159    Review of Systems:  Constitutional: negative for fever and chills  Respiratory: negative for cough and hemoptysis  Cardiovascular:   Gastrointestinal: negative for abdominal pain, diarrhea, nausea and vomiting  Genitourinary:negative for dysuria and hematuria  Derm: negative for rash and skin lesion(s)  Neurological: negative for seizures and tremors  Endocrine: negative for diabetic symptoms including polydipsia and polyuria  Musculoskeletal: negative for CTD  Psychiatric: negative for psychosis and major depression    She is an alert Pain. , Disp: , Rfl:       Note: This report was completed utilizing computer voice recognition software. Every effort has been made to ensure accuracy, however; inadvertent computerized transcription errors may be present. Alessandro Summers.  Kim Hugo MD

## 2020-01-14 ENCOUNTER — OFFICE VISIT (OUTPATIENT)
Dept: CARDIOLOGY CLINIC | Age: 70
End: 2020-01-14
Payer: MEDICARE

## 2020-01-14 VITALS
BODY MASS INDEX: 33.27 KG/M2 | WEIGHT: 187.8 LBS | HEIGHT: 63 IN | DIASTOLIC BLOOD PRESSURE: 80 MMHG | RESPIRATION RATE: 16 BRPM | SYSTOLIC BLOOD PRESSURE: 128 MMHG | HEART RATE: 77 BPM

## 2020-01-14 PROCEDURE — 93000 ELECTROCARDIOGRAM COMPLETE: CPT | Performed by: INTERNAL MEDICINE

## 2020-01-14 PROCEDURE — G8417 CALC BMI ABV UP PARAM F/U: HCPCS | Performed by: INTERNAL MEDICINE

## 2020-01-14 PROCEDURE — G8484 FLU IMMUNIZE NO ADMIN: HCPCS | Performed by: INTERNAL MEDICINE

## 2020-01-14 PROCEDURE — 1036F TOBACCO NON-USER: CPT | Performed by: INTERNAL MEDICINE

## 2020-01-14 PROCEDURE — 4040F PNEUMOC VAC/ADMIN/RCVD: CPT | Performed by: INTERNAL MEDICINE

## 2020-01-14 PROCEDURE — G8400 PT W/DXA NO RESULTS DOC: HCPCS | Performed by: INTERNAL MEDICINE

## 2020-01-14 PROCEDURE — 99213 OFFICE O/P EST LOW 20 MIN: CPT | Performed by: INTERNAL MEDICINE

## 2020-01-14 PROCEDURE — 3017F COLORECTAL CA SCREEN DOC REV: CPT | Performed by: INTERNAL MEDICINE

## 2020-01-14 PROCEDURE — 1123F ACP DISCUSS/DSCN MKR DOCD: CPT | Performed by: INTERNAL MEDICINE

## 2020-01-14 PROCEDURE — G8427 DOCREV CUR MEDS BY ELIG CLIN: HCPCS | Performed by: INTERNAL MEDICINE

## 2020-01-14 PROCEDURE — 1090F PRES/ABSN URINE INCON ASSESS: CPT | Performed by: INTERNAL MEDICINE

## 2020-02-04 ENCOUNTER — TELEPHONE (OUTPATIENT)
Dept: CARDIOLOGY | Age: 70
End: 2020-02-04

## 2020-02-05 ENCOUNTER — HOSPITAL ENCOUNTER (OUTPATIENT)
Dept: CARDIOLOGY | Age: 70
Discharge: HOME | End: 2020-02-05
Payer: MEDICARE

## 2020-02-05 PROCEDURE — 93308 TTE F-UP OR LMTD: CPT

## 2020-02-05 NOTE — PROGRESS NOTES
CTD  Psychiatric: negative for psychosis and major depression    On examination, she is an alert pleasant elderly -American female in no distress  Skin is warm and dry. Respirations are unlabored. /70   Pulse 76   Resp 16   Ht 5' 4\" (1.626 m)   Wt 192 lb 3.2 oz (87.2 kg)   BMI 32.99 kg/m² . HEENT negative for scleral icterus. Extraocular muscles intact. No facial asymmetry or central cyanosis. Neck without masses or goiter. No bruit or JVD. Cardiac apex not displaced. Rhythm regular. Abdomen normal.  Extremities without edema. Lungs are clear    EKG today shows sinus rhythm 76/min. Possible old septal MI. Posterior axis. Left axis. Today the wearable cardio defibrillator is discontinued. A proBNP and BMP will be checked. Pending these results sacubitril/valsartan may be considered to replace lisinopril. She also may be given a trial of lower Lasix dose. If no changes eventually are made, then she will be seen back in 6 months.     At completion of today's visit, medications include the following:    Current Outpatient Medications:     ferrous sulfate 325 (65 Fe) MG tablet, Take 325 mg by mouth daily (with breakfast), Disp: , Rfl:     spironolactone (ALDACTONE) 25 MG tablet, Take 0.5 tablets by mouth daily, Disp: 30 tablet, Rfl: 5    lisinopril (PRINIVIL;ZESTRIL) 5 MG tablet, Take 1 tablet by mouth daily, Disp: 30 tablet, Rfl: 5    furosemide (LASIX) 40 MG tablet, Take 1 tablet by mouth daily, Disp: 60 tablet, Rfl: 5    carvedilol (COREG) 25 MG tablet, Take 1 tablet by mouth 2 times daily (with meals), Disp: 60 tablet, Rfl: 5    docusate sodium (RA COL-RITE) 100 MG capsule, Take 100 mg by mouth as needed , Disp: , Rfl:     cyclobenzaprine (FLEXERIL) 10 MG tablet, Take 10 mg by mouth 3 times daily as needed for Muscle spasms, Disp: , Rfl:     vitamin C (ASCORBIC ACID) 500 MG tablet, Take 500 mg by mouth daily, Disp: , Rfl:     FLUoxetine (PROZAC) 20 MG capsule, Take 20 mg by mouth daily, Disp: , Rfl:     insulin glargine (LANTUS) 100 UNIT/ML injection vial, Inject 22 Units into the skin nightly , Disp: , Rfl:     oxyCODONE-acetaminophen (PERCOCET) 7.5-325 MG per tablet, Take 1 tablet by mouth every 6 hours as needed for Pain. , Disp: , Rfl:       Note: This report was completed utilizing computer voice recognition software. Every effort has been made to ensure accuracy, however; inadvertent computerized transcription errors may be present. Alessandro Summers.  Kim Hugo MD

## 2020-02-06 ENCOUNTER — OFFICE VISIT (OUTPATIENT)
Dept: CARDIOLOGY CLINIC | Age: 70
End: 2020-02-06
Payer: MEDICARE

## 2020-02-06 ENCOUNTER — HOSPITAL ENCOUNTER (OUTPATIENT)
Age: 70
Discharge: HOME | End: 2020-02-08
Payer: MEDICARE

## 2020-02-06 VITALS
DIASTOLIC BLOOD PRESSURE: 70 MMHG | HEART RATE: 76 BPM | WEIGHT: 192.2 LBS | RESPIRATION RATE: 16 BRPM | BODY MASS INDEX: 32.81 KG/M2 | SYSTOLIC BLOOD PRESSURE: 120 MMHG | HEIGHT: 64 IN

## 2020-02-06 LAB
ANION GAP SERPL CALCULATED.3IONS-SCNC: 15 MMOL/L (ref 7–16)
BUN BLDV-MCNC: 37 MG/DL (ref 8–23)
CALCIUM SERPL-MCNC: 9.7 MG/DL (ref 8.6–10.2)
CHLORIDE BLD-SCNC: 103 MMOL/L (ref 98–107)
CO2: 22 MMOL/L (ref 22–29)
CREAT SERPL-MCNC: 1.2 MG/DL (ref 0.5–1)
GFR AFRICAN AMERICAN: 53
GFR NON-AFRICAN AMERICAN: 53 ML/MIN/1.73
GLUCOSE BLD-MCNC: 247 MG/DL (ref 74–99)
POTASSIUM SERPL-SCNC: 5.5 MMOL/L (ref 3.5–5)
PRO-BNP: 802 PG/ML (ref 0–125)
SODIUM BLD-SCNC: 140 MMOL/L (ref 132–146)

## 2020-02-06 PROCEDURE — 1036F TOBACCO NON-USER: CPT | Performed by: INTERNAL MEDICINE

## 2020-02-06 PROCEDURE — 83880 ASSAY OF NATRIURETIC PEPTIDE: CPT

## 2020-02-06 PROCEDURE — 99213 OFFICE O/P EST LOW 20 MIN: CPT | Performed by: INTERNAL MEDICINE

## 2020-02-06 PROCEDURE — G8484 FLU IMMUNIZE NO ADMIN: HCPCS | Performed by: INTERNAL MEDICINE

## 2020-02-06 PROCEDURE — 80048 BASIC METABOLIC PNL TOTAL CA: CPT

## 2020-02-06 PROCEDURE — 4040F PNEUMOC VAC/ADMIN/RCVD: CPT | Performed by: INTERNAL MEDICINE

## 2020-02-06 PROCEDURE — 3017F COLORECTAL CA SCREEN DOC REV: CPT | Performed by: INTERNAL MEDICINE

## 2020-02-06 PROCEDURE — G8417 CALC BMI ABV UP PARAM F/U: HCPCS | Performed by: INTERNAL MEDICINE

## 2020-02-06 PROCEDURE — 1090F PRES/ABSN URINE INCON ASSESS: CPT | Performed by: INTERNAL MEDICINE

## 2020-02-06 PROCEDURE — 93000 ELECTROCARDIOGRAM COMPLETE: CPT | Performed by: INTERNAL MEDICINE

## 2020-02-06 PROCEDURE — 1123F ACP DISCUSS/DSCN MKR DOCD: CPT | Performed by: INTERNAL MEDICINE

## 2020-02-06 PROCEDURE — G8427 DOCREV CUR MEDS BY ELIG CLIN: HCPCS | Performed by: INTERNAL MEDICINE

## 2020-02-06 PROCEDURE — G8400 PT W/DXA NO RESULTS DOC: HCPCS | Performed by: INTERNAL MEDICINE

## 2020-02-06 RX ORDER — FERROUS SULFATE 325(65) MG
325 TABLET ORAL
COMMUNITY

## 2020-02-24 ENCOUNTER — TELEPHONE (OUTPATIENT)
Dept: ADMINISTRATIVE | Age: 70
End: 2020-02-24

## 2020-02-24 NOTE — PROGRESS NOTES
[facility]  Kaitlyn Latus. Jordon Okeefe M.D. [facility] Leahy M.D.  Dasia Richard. [address] [facility] NELLY Espinal Jhon People, M.D. Fulton Ley, Lorn Levy, M.D. MD Marlon Murdock   1950  Pamela Branch DO      This 24-year-old woman is seen today for outpatient cardiac follow-up. She has a nonischemic dilated cardiomyopathy. Recently she had discontinuation of her wearable cardio defibrillator because of improvement in EF. She is undergoing upward titration of medications per guidelines and is asymptomatic except for dyspnea after climbing 2 flights of stairs. She has noticed some increase in abdominal girth. She notes paresthesias extending from her left shoulder to fingertips. An echocardiogram 02/05/2020 shows a normal ejection fraction. Her wearable cardio defibrillator was therefore discontinued. A proBNP 02/06/2020 was 802. K was 5.5 with normal electrolytes BUN and creatinine    Medical History:  1. Admitted 10/20 2019 via ED for dyspnea and fatigue   present for 24/36 hours   ED /119.  Sinus tachycardia 114.  Chest CT \"groundglass\".  ProBNP 2832.  Troponin normal.  D-dimer 4490.  Prolactin 0.9.  2. HTN  3. T2DM (A1C 9.7)  4. Asthma  5. Fibromyalgia. 6. Iron deficiency anemia (prn iron infusions). 7. Depression. 8. Echo  LVEF = 25%. 10/20/2019  9. Cardiac catheterization 10/23/2019 showed no hemodynamically significant coronary artery disease. 10. Wearable cardio defibrillator applied 10/24/2019. 11. Outpatient cardiac follow-up, 11/07/2019. Coreg increased to 12.5 twice daily. 12. Outpatient cardiac follow-up, 11/20/2019. Coreg increased to 25 mg twice daily. 13. Labs, 12/12/2019. BUN= 37.  Creatinine= 1.3.  K=4.0.  proBNP= 1159. 14. Echo, 02/05/2020. RADHA. Biplane EF 59%. Apical hypokinesis.   LifeVest removed  15   Labs 02/06/2020:K = proBNP= 802  K= 5.5 with normal  BUN and creatinine   16   Outpatient cardiac follow-up

## 2020-02-24 NOTE — TELEPHONE ENCOUNTER
Contacted patient's daughter, Cherelle Chandra. She indicates that her mother has been experiencing numbness in her arm since LifeVest removal.  They also note increased abdominal swelling. She has no other symptoms. Patient has been scheduled for office visit with Dr. Kim Hugo on 2/25/2020.

## 2020-02-25 ENCOUNTER — HOSPITAL ENCOUNTER (OUTPATIENT)
Age: 70
Discharge: HOME | End: 2020-02-27
Payer: MEDICARE

## 2020-02-25 ENCOUNTER — OFFICE VISIT (OUTPATIENT)
Dept: CARDIOLOGY CLINIC | Age: 70
End: 2020-02-25
Payer: MEDICARE

## 2020-02-25 VITALS
RESPIRATION RATE: 18 BRPM | HEIGHT: 63 IN | SYSTOLIC BLOOD PRESSURE: 120 MMHG | HEART RATE: 69 BPM | BODY MASS INDEX: 33.73 KG/M2 | WEIGHT: 190.4 LBS | DIASTOLIC BLOOD PRESSURE: 70 MMHG

## 2020-02-25 LAB
ANION GAP SERPL CALCULATED.3IONS-SCNC: 15 MMOL/L (ref 7–16)
BUN BLDV-MCNC: 26 MG/DL (ref 8–23)
CALCIUM SERPL-MCNC: 9.7 MG/DL (ref 8.6–10.2)
CHLORIDE BLD-SCNC: 95 MMOL/L (ref 98–107)
CO2: 27 MMOL/L (ref 22–29)
CREAT SERPL-MCNC: 1 MG/DL (ref 0.5–1)
GFR AFRICAN AMERICAN: >60
GFR NON-AFRICAN AMERICAN: >60 ML/MIN/1.73
GLUCOSE BLD-MCNC: 313 MG/DL (ref 74–99)
POTASSIUM SERPL-SCNC: 4.6 MMOL/L (ref 3.5–5)
SODIUM BLD-SCNC: 137 MMOL/L (ref 132–146)

## 2020-02-25 PROCEDURE — 3017F COLORECTAL CA SCREEN DOC REV: CPT | Performed by: INTERNAL MEDICINE

## 2020-02-25 PROCEDURE — 1036F TOBACCO NON-USER: CPT | Performed by: INTERNAL MEDICINE

## 2020-02-25 PROCEDURE — 4040F PNEUMOC VAC/ADMIN/RCVD: CPT | Performed by: INTERNAL MEDICINE

## 2020-02-25 PROCEDURE — G8400 PT W/DXA NO RESULTS DOC: HCPCS | Performed by: INTERNAL MEDICINE

## 2020-02-25 PROCEDURE — G8427 DOCREV CUR MEDS BY ELIG CLIN: HCPCS | Performed by: INTERNAL MEDICINE

## 2020-02-25 PROCEDURE — G8417 CALC BMI ABV UP PARAM F/U: HCPCS | Performed by: INTERNAL MEDICINE

## 2020-02-25 PROCEDURE — G8484 FLU IMMUNIZE NO ADMIN: HCPCS | Performed by: INTERNAL MEDICINE

## 2020-02-25 PROCEDURE — 1123F ACP DISCUSS/DSCN MKR DOCD: CPT | Performed by: INTERNAL MEDICINE

## 2020-02-25 PROCEDURE — 80048 BASIC METABOLIC PNL TOTAL CA: CPT

## 2020-02-25 PROCEDURE — 99213 OFFICE O/P EST LOW 20 MIN: CPT | Performed by: INTERNAL MEDICINE

## 2020-02-25 PROCEDURE — 93000 ELECTROCARDIOGRAM COMPLETE: CPT | Performed by: INTERNAL MEDICINE

## 2020-02-25 PROCEDURE — 1090F PRES/ABSN URINE INCON ASSESS: CPT | Performed by: INTERNAL MEDICINE

## 2020-03-05 NOTE — PROGRESS NOTES
tablet by mouth daily, Disp: 60 tablet, Rfl: 5    carvedilol (COREG) 25 MG tablet, Take 1 tablet by mouth 2 times daily (with meals), Disp: 60 tablet, Rfl: 5    docusate sodium (RA COL-RITE) 100 MG capsule, Take 100 mg by mouth as needed , Disp: , Rfl:     vitamin C (ASCORBIC ACID) 500 MG tablet, Take 500 mg by mouth daily, Disp: , Rfl:     FLUoxetine (PROZAC) 20 MG capsule, Take 20 mg by mouth daily, Disp: , Rfl:     insulin glargine (LANTUS) 100 UNIT/ML injection vial, Inject 22 Units into the skin nightly , Disp: , Rfl:     oxyCODONE-acetaminophen (PERCOCET) 7.5-325 MG per tablet, Take 1 tablet by mouth every 6 hours as needed for Pain. , Disp: , Rfl:     cyclobenzaprine (FLEXERIL) 10 MG tablet, Take 10 mg by mouth 3 times daily as needed for Muscle spasms, Disp: , Rfl:       Note: This report was completed utilizing computer voice recognition software. Every effort has been made to ensure accuracy, however; inadvertent computerized transcription errors may be present. Sury Bailey.  Hong Mullins MD

## 2020-03-10 ENCOUNTER — OFFICE VISIT (OUTPATIENT)
Dept: CARDIOLOGY CLINIC | Age: 70
End: 2020-03-10
Payer: MEDICARE

## 2020-03-10 VITALS
RESPIRATION RATE: 14 BRPM | WEIGHT: 188 LBS | BODY MASS INDEX: 33.31 KG/M2 | SYSTOLIC BLOOD PRESSURE: 118 MMHG | HEART RATE: 64 BPM | DIASTOLIC BLOOD PRESSURE: 70 MMHG | HEIGHT: 63 IN

## 2020-03-10 PROCEDURE — 4040F PNEUMOC VAC/ADMIN/RCVD: CPT | Performed by: INTERNAL MEDICINE

## 2020-03-10 PROCEDURE — 99213 OFFICE O/P EST LOW 20 MIN: CPT | Performed by: INTERNAL MEDICINE

## 2020-03-10 PROCEDURE — G8400 PT W/DXA NO RESULTS DOC: HCPCS | Performed by: INTERNAL MEDICINE

## 2020-03-10 PROCEDURE — 1123F ACP DISCUSS/DSCN MKR DOCD: CPT | Performed by: INTERNAL MEDICINE

## 2020-03-10 PROCEDURE — G8484 FLU IMMUNIZE NO ADMIN: HCPCS | Performed by: INTERNAL MEDICINE

## 2020-03-10 PROCEDURE — 3017F COLORECTAL CA SCREEN DOC REV: CPT | Performed by: INTERNAL MEDICINE

## 2020-03-10 PROCEDURE — 1036F TOBACCO NON-USER: CPT | Performed by: INTERNAL MEDICINE

## 2020-03-10 PROCEDURE — 1090F PRES/ABSN URINE INCON ASSESS: CPT | Performed by: INTERNAL MEDICINE

## 2020-03-10 PROCEDURE — 93000 ELECTROCARDIOGRAM COMPLETE: CPT | Performed by: INTERNAL MEDICINE

## 2020-03-10 PROCEDURE — G8427 DOCREV CUR MEDS BY ELIG CLIN: HCPCS | Performed by: INTERNAL MEDICINE

## 2020-03-10 PROCEDURE — G8417 CALC BMI ABV UP PARAM F/U: HCPCS | Performed by: INTERNAL MEDICINE

## 2020-04-14 ENCOUNTER — TELEPHONE (OUTPATIENT)
Dept: CARDIOLOGY CLINIC | Age: 70
End: 2020-04-14

## 2020-04-21 ENCOUNTER — VIRTUAL VISIT (OUTPATIENT)
Dept: CARDIOLOGY CLINIC | Age: 70
End: 2020-04-21
Payer: MEDICARE

## 2020-04-21 VITALS
DIASTOLIC BLOOD PRESSURE: 57 MMHG | WEIGHT: 193.5 LBS | SYSTOLIC BLOOD PRESSURE: 137 MMHG | HEART RATE: 64 BPM | BODY MASS INDEX: 33.03 KG/M2 | HEIGHT: 64 IN

## 2020-04-21 PROCEDURE — 1123F ACP DISCUSS/DSCN MKR DOCD: CPT | Performed by: INTERNAL MEDICINE

## 2020-04-21 PROCEDURE — G8417 CALC BMI ABV UP PARAM F/U: HCPCS | Performed by: INTERNAL MEDICINE

## 2020-04-21 PROCEDURE — G8400 PT W/DXA NO RESULTS DOC: HCPCS | Performed by: INTERNAL MEDICINE

## 2020-04-21 PROCEDURE — 1036F TOBACCO NON-USER: CPT | Performed by: INTERNAL MEDICINE

## 2020-04-21 PROCEDURE — G8427 DOCREV CUR MEDS BY ELIG CLIN: HCPCS | Performed by: INTERNAL MEDICINE

## 2020-04-21 PROCEDURE — 1090F PRES/ABSN URINE INCON ASSESS: CPT | Performed by: INTERNAL MEDICINE

## 2020-04-21 PROCEDURE — 4040F PNEUMOC VAC/ADMIN/RCVD: CPT | Performed by: INTERNAL MEDICINE

## 2020-04-21 PROCEDURE — 99442 PR PHYS/QHP TELEPHONE EVALUATION 11-20 MIN: CPT | Performed by: INTERNAL MEDICINE

## 2020-04-21 PROCEDURE — 3017F COLORECTAL CA SCREEN DOC REV: CPT | Performed by: INTERNAL MEDICINE

## 2020-04-21 NOTE — PROGRESS NOTES
[facility]  [facility]. Cass Franklin M.D. Adelina Vicente M.D.  Derek Hill. [employer], M.D. Arcelia Brunner, Lisa Larson M.D. Carissa Raymond, Krupa Castro M.D. Janina Buerger, MD          [id number] DO Raghu Wu is a 65 y.o. female evaluated via telephone on 4/21/2020. The assessment is conducted from the patient's home and the physician located at the [facility]. Consent:  She and/or health care decision maker is aware that that she may receive a bill for this telephone service, depending on her insurance coverage, and has provided verbal consent to proceed: Yes     She has a nonischemic cardiomyopathy with recent improvement in ejection fraction and permitted discontinuation of her wearable cardio defibrillator. She is undergoing upward titration of medical therapy. She still becomes dyspneic mildly with 1 flight of stairs but has no chest pain orthopnea PND or ankle edema. She is evaluated today with the intention of upward titrating medications per guideline driven recommendations. She has been sedentary and is self quarantined for safety but is not having dyspnea chest pain sweats palpitations orthopnea or PND.     Medical History:  1. Admitted 10/20 2019 via ED for dyspnea and fatigue   present for 24/36 hours   ED /119.  Sinus tachycardia 114.  Chest CT \"groundglass\".  ProBNP 2832.  Troponin normal.  D-dimer 2315.  Prolactin 0.9.  2. HTN  3. T2DM (A1C 9.7)  4. Asthma  5. Fibromyalgia. 6. Iron deficiency anemia (prn iron infusions). 7. Depression. 8. Echo  LVEF = 25%. 10/20/2019  9. Cardiac catheterization 10/23/2019 showed no hemodynamically significant coronary artery disease. 10. Wearable cardio defibrillator applied 10/24/2019. 11. Outpatient cardiac follow-up, 11/07/2019. Coreg increased to 12.5 twice daily. 12. Outpatient cardiac follow-up, 11/20/2019. Coreg increased to 25 mg twice daily. 13. Labs, 12/12/2019. BUN= 37.  Creatinine= 1.3.  K=4.0.  proBNP= 1159. 14. Echo, 02/05/2020. RADHA. Biplane EF 59%. Apical hypokinesis. LifeVest removed  15. Labs, 02/06/2020. ProBNP= 802,  K= 5.5 with normal  BUN and creatinine. 16. Outpatient cardiac follow-up, 02/06/2020. EF biplane 59%. .  Defibrillator discontinued. 17. Outpatient cardiac assessment, 02/25/2020. Still feels dyspneic. BMP and proBNP checked. Entresto 24/49 started. Initiated after 48-hour washout of lisinopril. 18. Outpatient cardiac follow-up 03/10/2020: Minimal dyspnea. K4.6. BUN 26. Creatinine 1.0.  proBNP 802 (02/06/2020). 23. Phone call 04/21/2020: Asymptomatic. No medication changes  20. Review of Systems:  Constitutional: negative for fever and chills  Respiratory: negative for cough and hemoptysis  Cardiovascular:   Gastrointestinal: negative for abdominal pain, diarrhea, nausea and vomiting  Genitourinary:negative for dysuria and hematuria  Derm: negative for rash and skin lesion(s)  Neurological: negative for seizures and tremors  Endocrine: negative for diabetic symptoms including polydipsia and polyuria  Musculoskeletal: negative for CTD  Psychiatric: negative for psychosis and major depression  21. .        Self-reported BP today 137/57. Pulse 64 weight 193. BMI 33.21      The patient is symptomatically and functionally stable from a cardiac perspective. No medication changes are made today. She will be requested to have a proBNP and BMP in 3 or 4 weeks depending on that result adjustments may be made in Lasix or sacubitril/valsartan. It is anticipated that she will have outpatient cardiac assessment in 3 months. Today all medications are reviewed. The patient's daughter confirms.   Rationale for testing reviewed      Documentation:  I communicated with the patient and/or health care decision maker     Details of this discussion including any medical advice provided:       I AFFIRM this is a Patient Initiated Episode with an Established Patient who has not had a related appointment within my department in the past 7 days or scheduled within the next 24 hours. Total Time: minutes: 11-20 minutes    Note: not billable if this call serves to triage the patient into an appointment for the relevant concern                          At completion of today's visit, medications include the following:    Current Outpatient Medications:     sacubitril-valsartan (ENTRESTO) 24-26 MG per tablet, Take 1 tablet by mouth 2 times daily, Disp: 60 tablet, Rfl: 5    ferrous sulfate 325 (65 Fe) MG tablet, Take 325 mg by mouth daily (with breakfast), Disp: , Rfl:     spironolactone (ALDACTONE) 25 MG tablet, Take 0.5 tablets by mouth daily, Disp: 30 tablet, Rfl: 5    furosemide (LASIX) 40 MG tablet, Take 1 tablet by mouth daily, Disp: 60 tablet, Rfl: 5    carvedilol (COREG) 25 MG tablet, Take 1 tablet by mouth 2 times daily (with meals), Disp: 60 tablet, Rfl: 5    docusate sodium (RA COL-RITE) 100 MG capsule, Take 100 mg by mouth as needed , Disp: , Rfl:     vitamin C (ASCORBIC ACID) 500 MG tablet, Take 500 mg by mouth daily, Disp: , Rfl:     FLUoxetine (PROZAC) 20 MG capsule, Take 20 mg by mouth daily, Disp: , Rfl:     insulin glargine (LANTUS) 100 UNIT/ML injection vial, Inject 22 Units into the skin nightly , Disp: , Rfl:     oxyCODONE-acetaminophen (PERCOCET) 7.5-325 MG per tablet, Take 1 tablet by mouth every 6 hours as needed for Pain. , Disp: , Rfl:     cyclobenzaprine (FLEXERIL) 10 MG tablet, Take 10 mg by mouth 3 times daily as needed for Muscle spasms, Disp: , Rfl:       Note: This report was completed utilizing computer voice recognition software. Every effort has been made to ensure accuracy, however; inadvertent computerized transcription errors may be present.       --Maite Enciso MD on 4/21/2020 at 8:22 AM

## 2020-07-21 ENCOUNTER — HOSPITAL ENCOUNTER (OUTPATIENT)
Age: 70
Discharge: HOME | End: 2020-07-23
Payer: MEDICARE

## 2020-07-21 ENCOUNTER — OFFICE VISIT (OUTPATIENT)
Dept: CARDIOLOGY CLINIC | Age: 70
End: 2020-07-21
Payer: MEDICARE

## 2020-07-21 VITALS
SYSTOLIC BLOOD PRESSURE: 128 MMHG | HEART RATE: 74 BPM | WEIGHT: 196.6 LBS | RESPIRATION RATE: 16 BRPM | DIASTOLIC BLOOD PRESSURE: 74 MMHG | BODY MASS INDEX: 34.84 KG/M2 | HEIGHT: 63 IN

## 2020-07-21 LAB
ANION GAP SERPL CALCULATED.3IONS-SCNC: 18 MMOL/L (ref 7–16)
BUN BLDV-MCNC: 30 MG/DL (ref 8–23)
CALCIUM SERPL-MCNC: 9.5 MG/DL (ref 8.6–10.2)
CHLORIDE BLD-SCNC: 99 MMOL/L (ref 98–107)
CO2: 22 MMOL/L (ref 22–29)
CREAT SERPL-MCNC: 1.5 MG/DL (ref 0.5–1)
GFR AFRICAN AMERICAN: 41
GFR NON-AFRICAN AMERICAN: 41 ML/MIN/1.73
GLUCOSE BLD-MCNC: 404 MG/DL (ref 74–99)
POTASSIUM SERPL-SCNC: 4.9 MMOL/L (ref 3.5–5)
PRO-BNP: 748 PG/ML (ref 0–125)
SODIUM BLD-SCNC: 139 MMOL/L (ref 132–146)

## 2020-07-21 PROCEDURE — 93000 ELECTROCARDIOGRAM COMPLETE: CPT | Performed by: INTERNAL MEDICINE

## 2020-07-21 PROCEDURE — G8417 CALC BMI ABV UP PARAM F/U: HCPCS | Performed by: INTERNAL MEDICINE

## 2020-07-21 PROCEDURE — 1090F PRES/ABSN URINE INCON ASSESS: CPT | Performed by: INTERNAL MEDICINE

## 2020-07-21 PROCEDURE — 80048 BASIC METABOLIC PNL TOTAL CA: CPT

## 2020-07-21 PROCEDURE — G8400 PT W/DXA NO RESULTS DOC: HCPCS | Performed by: INTERNAL MEDICINE

## 2020-07-21 PROCEDURE — 3017F COLORECTAL CA SCREEN DOC REV: CPT | Performed by: INTERNAL MEDICINE

## 2020-07-21 PROCEDURE — 83880 ASSAY OF NATRIURETIC PEPTIDE: CPT

## 2020-07-21 PROCEDURE — 99213 OFFICE O/P EST LOW 20 MIN: CPT | Performed by: INTERNAL MEDICINE

## 2020-07-21 PROCEDURE — G8427 DOCREV CUR MEDS BY ELIG CLIN: HCPCS | Performed by: INTERNAL MEDICINE

## 2020-07-21 PROCEDURE — 1123F ACP DISCUSS/DSCN MKR DOCD: CPT | Performed by: INTERNAL MEDICINE

## 2020-07-21 PROCEDURE — 1036F TOBACCO NON-USER: CPT | Performed by: INTERNAL MEDICINE

## 2020-07-21 PROCEDURE — 4040F PNEUMOC VAC/ADMIN/RCVD: CPT | Performed by: INTERNAL MEDICINE

## 2020-07-21 RX ORDER — CARVEDILOL 25 MG/1
25 TABLET ORAL 2 TIMES DAILY WITH MEALS
Qty: 60 TABLET | Refills: 5 | Status: SHIPPED
Start: 2020-07-21 | End: 2021-02-02 | Stop reason: SDUPTHER

## 2020-07-21 NOTE — PROGRESS NOTES
Entresto 24/49 started.  Initiated after 48-hour washout of lisinopril. 18. Outpatient cardiac follow-up 03/10/2020: Minimal dyspnea.  K4. 6.  BUN 26.  Creatinine 1.0.  proBNP 802 (02/06/2020). 23. Phone call 04/21/2020: Asymptomatic. No medication changes  20. Outpatient cardiac assessment 07/21/2020: No medication change          Review of Systems:  Constitutional: negative for fever and chills  Respiratory: negative for cough and hemoptysis  Cardiovascular:   Gastrointestinal: negative for abdominal pain, diarrhea, nausea and vomiting  Genitourinary:negative for dysuria and hematuria  Derm: negative for rash and skin lesion(s)  Neurological: negative for seizures and tremors  Endocrine: negative for diabetic symptoms including polydipsia and polyuria  Musculoskeletal: negative for CTD  Psychiatric: negative for psychosis and major depression    On examination, she is an alert pleasant elderly female no acute distress. Skin is warm and dry. Respirations are unlabored. /74   Pulse 74   Resp 16   Ht 5' 3\" (1.6 m)   Wt 196 lb 9.6 oz (89.2 kg)   BMI 34.83 kg/m² . HEENT negative for scleral icterus. Extraocular muscles intact. No facial asymmetry or central cyanosis. Neck without masses or goiter. No bruit or JVD. Cardiac apex not displaced. Rhythm regular. Abdomen normal.  Extremities without edema. EKG today shows normal sinus rhythm. Nonspecific poor anterior R wave progression    The patient is symptomatically and functionally responded well to guideline driven medical therapy for cardiomyopathy. Today BMP and proBNP are checked. This will help guide further upward titration.   She will have cardiac follow-up in 6 months    At completion of today's visit, medications include the following:    Current Outpatient Medications:     sacubitril-valsartan (ENTRESTO) 24-26 MG per tablet, Take 1 tablet by mouth 2 times daily, Disp: 60 tablet, Rfl: 5    ferrous sulfate 325 (65 Fe) MG tablet, Take 325 mg by mouth daily (with breakfast), Disp: , Rfl:     spironolactone (ALDACTONE) 25 MG tablet, Take 0.5 tablets by mouth daily, Disp: 30 tablet, Rfl: 5    furosemide (LASIX) 40 MG tablet, Take 1 tablet by mouth daily, Disp: 60 tablet, Rfl: 5    carvedilol (COREG) 25 MG tablet, Take 1 tablet by mouth 2 times daily (with meals), Disp: 60 tablet, Rfl: 5    docusate sodium (RA COL-RITE) 100 MG capsule, Take 100 mg by mouth as needed , Disp: , Rfl:     vitamin C (ASCORBIC ACID) 500 MG tablet, Take 500 mg by mouth daily, Disp: , Rfl:     FLUoxetine (PROZAC) 20 MG capsule, Take 20 mg by mouth daily, Disp: , Rfl:     insulin glargine (LANTUS) 100 UNIT/ML injection vial, Inject 22 Units into the skin nightly , Disp: , Rfl:     oxyCODONE-acetaminophen (PERCOCET) 7.5-325 MG per tablet, Take 1 tablet by mouth every 6 hours as needed for Pain. , Disp: , Rfl:     cyclobenzaprine (FLEXERIL) 10 MG tablet, Take 10 mg by mouth 3 times daily as needed for Muscle spasms, Disp: , Rfl:       Note: This report was completed utilizing computer voice recognition software. Every effort has been made to ensure accuracy, however; inadvertent computerized transcription errors may be present.     --Meredith Sanchez MD on 7/21/2020 at 8:47 AM

## 2020-07-24 ENCOUNTER — TELEPHONE (OUTPATIENT)
Dept: CARDIOLOGY CLINIC | Age: 70
End: 2020-07-24

## 2020-07-24 NOTE — TELEPHONE ENCOUNTER
Contacted patient with lab results. She acknowledged understanding.   Letter forwarded to Dr. Hakeem Rowell.    ----- Message from Meredith Sanchez MD sent at 7/24/2020  1:38 PM EDT -----    Amie Rich tell the patient that her labs are really good and she should stay on her medications and we will see her back in 6 months

## 2020-10-01 ENCOUNTER — OFFICE VISIT (OUTPATIENT)
Dept: CARDIOLOGY CLINIC | Age: 70
End: 2020-10-01
Payer: MEDICARE

## 2020-10-01 VITALS
HEART RATE: 73 BPM | DIASTOLIC BLOOD PRESSURE: 60 MMHG | RESPIRATION RATE: 20 BRPM | WEIGHT: 201.7 LBS | SYSTOLIC BLOOD PRESSURE: 106 MMHG | HEIGHT: 63 IN | BODY MASS INDEX: 35.74 KG/M2

## 2020-10-01 PROCEDURE — 1123F ACP DISCUSS/DSCN MKR DOCD: CPT | Performed by: INTERNAL MEDICINE

## 2020-10-01 PROCEDURE — G8427 DOCREV CUR MEDS BY ELIG CLIN: HCPCS | Performed by: INTERNAL MEDICINE

## 2020-10-01 PROCEDURE — G8484 FLU IMMUNIZE NO ADMIN: HCPCS | Performed by: INTERNAL MEDICINE

## 2020-10-01 PROCEDURE — 1090F PRES/ABSN URINE INCON ASSESS: CPT | Performed by: INTERNAL MEDICINE

## 2020-10-01 PROCEDURE — 93000 ELECTROCARDIOGRAM COMPLETE: CPT | Performed by: INTERNAL MEDICINE

## 2020-10-01 PROCEDURE — 99214 OFFICE O/P EST MOD 30 MIN: CPT | Performed by: INTERNAL MEDICINE

## 2020-10-01 PROCEDURE — G8400 PT W/DXA NO RESULTS DOC: HCPCS | Performed by: INTERNAL MEDICINE

## 2020-10-01 PROCEDURE — G8417 CALC BMI ABV UP PARAM F/U: HCPCS | Performed by: INTERNAL MEDICINE

## 2020-10-01 PROCEDURE — 1036F TOBACCO NON-USER: CPT | Performed by: INTERNAL MEDICINE

## 2020-10-01 PROCEDURE — 3017F COLORECTAL CA SCREEN DOC REV: CPT | Performed by: INTERNAL MEDICINE

## 2020-10-01 PROCEDURE — 4040F PNEUMOC VAC/ADMIN/RCVD: CPT | Performed by: INTERNAL MEDICINE

## 2020-10-01 RX ORDER — SPIRONOLACTONE 25 MG/1
12.5 TABLET ORAL DAILY
Qty: 30 TABLET | Refills: 5 | Status: SHIPPED | OUTPATIENT
Start: 2020-10-01 | End: 2022-01-01 | Stop reason: SDUPTHER

## 2020-10-01 RX ORDER — AMLODIPINE BESYLATE 10 MG/1
10 TABLET ORAL DAILY
COMMUNITY
Start: 2020-09-29 | End: 2021-01-28 | Stop reason: CLARIF

## 2020-10-01 NOTE — PROGRESS NOTES
after 48-hour washout of lisinopril. 18. Outpatient cardiac follow-up 03/10/2020: Minimal dyspnea.  K4. 6.  BUN 26.  Creatinine 1.0.  proBNP 802 (02/06/2020). 19. Phone call 04/21/2020: Asymptomatic.  No medication changes  20. Outpatient cardiac assessment 07/21/2020: No medication change  21. Worsening hypertension. Norvasc started by Dr. Ashley Angel 09/30/2020  22. Labs 09/29/2020: H/H 11/35. BUN 18. Creatinine 1. 21.  K4.0.             Review of Systems:  Constitutional: negative for fever and chills  Respiratory: negative for cough and hemoptysis  Cardiovascular:   Gastrointestinal: negative for abdominal pain, diarrhea, nausea and vomiting  Genitourinary:negative for dysuria and hematuria  Derm: negative for rash and skin lesion(s)  Neurological: negative for seizures and tremors  Endocrine: negative for diabetic symptoms including polydipsia and polyuria  Musculoskeletal: negative for CTD  Psychiatric: negative for psychosis and major depression     On examination, she is an alert appropriate elderly -American female in no acute distress   skin is warm and dry. Respirations are unlabored. /60   Pulse 73   Resp 20   Ht 5' 3\" (1.6 m)   Wt 201 lb 11.2 oz (91.5 kg)   BMI 35.73 kg/m² . HEENT negative for scleral icterus. Extraocular muscles intact. No facial asymmetry or central cyanosis. Neck without masses or goiter. No bruit or JVD. Cardiac apex not displaced. Rhythm regular. Abdomen normal.  Extremities without edema. Fine crepitant rales present at both lung bases    EKG today per Dr. Radha Clifton interpretation: Sinus mechanism 73/min. Possible old septal MI.    Mrs. [address] [facility] has easily induced dyspnea which is a significant change. There is no overt decompensated CHF on today's exam.  She has a history of \"groundglass\" on a chest CT last year and therefore some of her dyspnea may be pulmonary. A repeat CT scan Lab work from Dr. Ashley Angel office reviewed.   Previous CT findings labs and plan were discussed at length with the patient. At completion of today's visit, medications include the following:    Current Outpatient Medications:     amLODIPine (NORVASC) 10 MG tablet, Take 10 mg by mouth daily, Disp: , Rfl:     sacubitril-valsartan (ENTRESTO) 24-26 MG per tablet, Take 1 tablet by mouth 2 times daily, Disp: 60 tablet, Rfl: 5    spironolactone (ALDACTONE) 25 MG tablet, Take 0.5 tablets by mouth daily, Disp: 30 tablet, Rfl: 5    carvedilol (COREG) 25 MG tablet, Take 1 tablet by mouth 2 times daily (with meals), Disp: 60 tablet, Rfl: 5    ferrous sulfate 325 (65 Fe) MG tablet, Take 325 mg by mouth daily (with breakfast), Disp: , Rfl:     furosemide (LASIX) 40 MG tablet, Take 1 tablet by mouth daily, Disp: 60 tablet, Rfl: 5    docusate sodium (RA COL-RITE) 100 MG capsule, Take 100 mg by mouth as needed , Disp: , Rfl:     cyclobenzaprine (FLEXERIL) 10 MG tablet, Take 10 mg by mouth 3 times daily as needed for Muscle spasms, Disp: , Rfl:     vitamin C (ASCORBIC ACID) 500 MG tablet, Take 500 mg by mouth daily, Disp: , Rfl:     FLUoxetine (PROZAC) 20 MG capsule, Take 20 mg by mouth daily, Disp: , Rfl:     insulin glargine (LANTUS) 100 UNIT/ML injection vial, Inject 25 Units into the skin nightly , Disp: , Rfl:     oxyCODONE-acetaminophen (PERCOCET) 7.5-325 MG per tablet, Take 1 tablet by mouth every 6 hours as needed for Pain. , Disp: , Rfl:       Note: This report was completed utilizing computer voice recognition software. Every effort has been made to ensure accuracy, however; inadvertent computerized transcription errors may be present.     --Charley Trevizo MD on 10/1/2020 at 12:31 PM

## 2020-10-02 ENCOUNTER — TELEPHONE (OUTPATIENT)
Dept: CARDIOLOGY CLINIC | Age: 70
End: 2020-10-02

## 2020-10-02 NOTE — TELEPHONE ENCOUNTER
Patient's daughter, Gabriela Jacques, called office to speak with Dr. Avelino Nunez. She states her mother was seen in the office yesterday and she was unable to accompany her. She would like to speak to Dr. Avelino Nunez to discuss her mother's visit so she has better understanding of what occurred.   She may be reached at [phone]

## 2020-10-09 ENCOUNTER — HOSPITAL ENCOUNTER (OUTPATIENT)
Age: 70
Discharge: HOME | End: 2020-10-09
Payer: MEDICARE

## 2020-10-09 LAB
ANION GAP SERPL CALCULATED.3IONS-SCNC: 7 MMOL/L (ref 7–16)
BUN BLDV-MCNC: 21 MG/DL (ref 8–23)
CALCIUM SERPL-MCNC: 8.9 MG/DL (ref 8.6–10.2)
CHLORIDE BLD-SCNC: 101 MMOL/L (ref 98–107)
CO2: 28 MMOL/L (ref 22–29)
CREAT SERPL-MCNC: 1.2 MG/DL (ref 0.5–1)
GFR AFRICAN AMERICAN: 53
GFR NON-AFRICAN AMERICAN: 53 ML/MIN/1.73
GLUCOSE BLD-MCNC: 406 MG/DL (ref 74–99)
POTASSIUM SERPL-SCNC: 4.5 MMOL/L (ref 3.5–5)
SODIUM BLD-SCNC: 136 MMOL/L (ref 132–146)

## 2020-10-09 PROCEDURE — 80048 BASIC METABOLIC PNL TOTAL CA: CPT

## 2020-10-09 PROCEDURE — 36415 COLL VENOUS BLD VENIPUNCTURE: CPT

## 2020-10-11 ENCOUNTER — HOSPITAL ENCOUNTER (OUTPATIENT)
Dept: CT IMAGING | Age: 70
Discharge: HOME | End: 2020-10-13
Payer: MEDICARE

## 2020-10-11 PROCEDURE — 6360000004 HC RX CONTRAST MEDICATION: Performed by: RADIOLOGY

## 2020-10-11 PROCEDURE — 71260 CT THORAX DX C+: CPT

## 2020-10-11 RX ADMIN — IOPAMIDOL 80 ML: 755 INJECTION, SOLUTION INTRAVENOUS at 08:59

## 2020-10-19 ENCOUNTER — TELEPHONE (OUTPATIENT)
Dept: CARDIOLOGY CLINIC | Age: 70
End: 2020-10-19

## 2020-10-19 NOTE — TELEPHONE ENCOUNTER
Patient returned call and was given results per Dr. Becky Durant. She verbalized understanding and wishes to proceed with echo. Letter forwarded to Dr. Calista Jones.

## 2020-10-19 NOTE — TELEPHONE ENCOUNTER
Left message for patient to contact office.     ----- Message from Eboni Bonner MD sent at 10/18/2020  9:29 PM EDT -----    Richard Naranjo tell the patient that her lungs look fine so we now need to recheck the echo which is a limited one for EF

## 2020-11-03 PROBLEM — J18.9 PNEUMONIA DUE TO INFECTIOUS ORGANISM: Status: RESOLVED | Noted: 2019-09-19 | Resolved: 2020-11-03

## 2020-11-04 ENCOUNTER — TELEPHONE (OUTPATIENT)
Dept: ADMINISTRATIVE | Age: 70
End: 2020-11-04

## 2020-11-04 NOTE — TELEPHONE ENCOUNTER
Amanda Ledezma called for advice/ appt, her mom has both swelled feet, pcp has been adjusting lasix, they need advice about her meds, please call her back, unable to reach the staff

## 2020-11-04 NOTE — TELEPHONE ENCOUNTER
Daughter Poornima Ambrocio calling ([phone]) for an apt with Dr. Shahnaz Sargent. C/o of edema - feet for the past 3 days. Her PCP prescribed the lasix that her mother is currently on with no increased dose - they informed her to call her cardiologist.  Please return call with apt/further recommendations.

## 2020-11-05 ENCOUNTER — HOSPITAL ENCOUNTER (OUTPATIENT)
Age: 70
Discharge: HOME | End: 2020-11-05
Payer: MEDICARE

## 2020-11-05 LAB
ANION GAP SERPL CALCULATED.3IONS-SCNC: 10 MMOL/L (ref 7–16)
BUN BLDV-MCNC: 24 MG/DL (ref 8–23)
CALCIUM SERPL-MCNC: 9.3 MG/DL (ref 8.6–10.2)
CHLORIDE BLD-SCNC: 95 MMOL/L (ref 98–107)
CO2: 30 MMOL/L (ref 22–29)
CREAT SERPL-MCNC: 1.3 MG/DL (ref 0.5–1)
GFR AFRICAN AMERICAN: 49
GFR NON-AFRICAN AMERICAN: 49 ML/MIN/1.73
GLUCOSE BLD-MCNC: 329 MG/DL (ref 74–99)
POTASSIUM SERPL-SCNC: 4.7 MMOL/L (ref 3.5–5)
PRO-BNP: 373 PG/ML (ref 0–125)
SODIUM BLD-SCNC: 135 MMOL/L (ref 132–146)

## 2020-11-05 PROCEDURE — 80048 BASIC METABOLIC PNL TOTAL CA: CPT

## 2020-11-05 PROCEDURE — 83880 ASSAY OF NATRIURETIC PEPTIDE: CPT

## 2020-11-05 PROCEDURE — 36415 COLL VENOUS BLD VENIPUNCTURE: CPT

## 2020-11-06 ENCOUNTER — TELEPHONE (OUTPATIENT)
Dept: CARDIOLOGY CLINIC | Age: 70
End: 2020-11-06

## 2020-11-06 NOTE — TELEPHONE ENCOUNTER
Contacted patient with recommendations per Dr. Juan Hnasen. Patient agrees with Dr. Juan Hansen and will hold Norvasc for the time being. She states that the swelling began after Norvasc was initiated. She is scheduled for an office visit on 11/11/2020 at 1 pm.  Letter has been forwarded to Dr. Castillo .     ----- Message from Jinny Akbar MD sent at 11/5/2020  5:31 PM EST -----      Lilian please tell the patient and her daughter they would like to try her either on a lower dose of Norvasc or preferably not at all and then we can try to squeeze her in next week sometime for a brief visit.   Explained to them that we think the Norvasc may be contributing to the swelling

## 2020-11-11 ENCOUNTER — OFFICE VISIT (OUTPATIENT)
Dept: CARDIOLOGY CLINIC | Age: 70
End: 2020-11-11
Payer: MEDICARE

## 2020-11-11 VITALS
SYSTOLIC BLOOD PRESSURE: 118 MMHG | BODY MASS INDEX: 35.83 KG/M2 | RESPIRATION RATE: 16 BRPM | WEIGHT: 202.2 LBS | HEIGHT: 63 IN | HEART RATE: 64 BPM | DIASTOLIC BLOOD PRESSURE: 70 MMHG

## 2020-11-11 PROCEDURE — 1123F ACP DISCUSS/DSCN MKR DOCD: CPT | Performed by: INTERNAL MEDICINE

## 2020-11-11 PROCEDURE — G8484 FLU IMMUNIZE NO ADMIN: HCPCS | Performed by: INTERNAL MEDICINE

## 2020-11-11 PROCEDURE — 99213 OFFICE O/P EST LOW 20 MIN: CPT | Performed by: INTERNAL MEDICINE

## 2020-11-11 PROCEDURE — 1036F TOBACCO NON-USER: CPT | Performed by: INTERNAL MEDICINE

## 2020-11-11 PROCEDURE — G8400 PT W/DXA NO RESULTS DOC: HCPCS | Performed by: INTERNAL MEDICINE

## 2020-11-11 PROCEDURE — 93000 ELECTROCARDIOGRAM COMPLETE: CPT | Performed by: INTERNAL MEDICINE

## 2020-11-11 PROCEDURE — 1090F PRES/ABSN URINE INCON ASSESS: CPT | Performed by: INTERNAL MEDICINE

## 2020-11-11 PROCEDURE — 4040F PNEUMOC VAC/ADMIN/RCVD: CPT | Performed by: INTERNAL MEDICINE

## 2020-11-11 PROCEDURE — G8427 DOCREV CUR MEDS BY ELIG CLIN: HCPCS | Performed by: INTERNAL MEDICINE

## 2020-11-11 PROCEDURE — G8417 CALC BMI ABV UP PARAM F/U: HCPCS | Performed by: INTERNAL MEDICINE

## 2020-11-11 PROCEDURE — 3017F COLORECTAL CA SCREEN DOC REV: CPT | Performed by: INTERNAL MEDICINE

## 2020-11-11 NOTE — PROGRESS NOTES
[facility]  [facility]. Gracia Simmonds, M.D. Marta Hansen M.D.  Kaylin Fry. [address] [facility], M.D. Joann Reid M.D. Jaspreet Ernst M.D. MD Ben Licea   1950  Terrance Chahal DO      This 77-year-old woman is seen today for outpatient cardiac follow-up. Lissa Leahy She has a history of hypertension and depressed ejection fraction. She had clinical and echo improvement on guideline medications. She recently had BP elevation and was given Norvasc. Temporally this was associated with bilateral ankle swelling which has mostly resolved since discontinuation of amlodipine. She is not having any chest pain and little exertional dyspnea. History:  1. Admitted 10/20 2019 via ED for dyspnea and fatigue   present for 24/36 hours   ED /119.  Sinus tachycardia 114.  Chest CT \"groundglass\".  ProBNP 2832.  Troponin normal.  D-dimer 4925.  Prolactin 0.9.  2. HTN  3. T2DM   4. Asthma  5. Fibromyalgia. 6. Iron deficiency anemia (prn iron infusions). 7. Depression. 8. Echo  LVEF = 25%.  10/20/2019  9. Cardiac catheterization 10/23/2019 showed no hemodynamically significant coronary artery disease. 10. Wearable cardio defibrillator applied 10/24/2019. 11. Outpatient cardiac follow-up, 11/07/2019. Coreg increased to 12.5 twice daily. 12. Outpatient cardiac follow-up, 11/20/2019. Coreg increased to 25 mg twice daily. 13. Labs, 12/12/2019. BUN= 37.  Creatinine= 1.3.  K=4.0.  proBNP= 1159. 14. Echo, 02/05/2020. RADHA.  Biplane EF 59%.  Apical hypokinesis.  LifeVest removed  15. Labs, 02/06/2020.  ProBNP= 802,  K= 5.5 with normal  BUN and creatinine.    16. Outpatient cardiac follow-up, 02/06/2020. EF biplane 59%. .  Defibrillator discontinued.    17. Outpatient cardiac assessment, 02/25/2020. Still feels dyspneic.  BMP and proBNP checked. Tad Clarity 24/49 started.  Initiated after 48-hour washout of lisinopril.   18. Outpatient cardiac follow-up 03/10/2020: Minimal dyspnea.  K4. 6.  BUN 26.  Creatinine 1.0.  proBNP 802 (02/06/2020). 19. Phone call 04/21/2020: Asymptomatic.  No medication changes  20. Outpatient cardiac assessment 07/21/2020: No medication change  21. Worsening hypertension. Norvasc started by Dr. Christina Caldwell 09/30/2020  22. Labs 09/29/2020: H/H 11/35. BUN 18. Creatinine 1. 21.  K4.0.  23. Chest CT, 10/11/2020. Lungs are clear.  Heart not dilated.  Coronary artery calcification present. Aortic valve calcified.  No pericardial effusion.  Thoracic aorta not dilated. 24. Labs, 11/05/2020. BUN= 24, creatinine =1.3, proBNP= 373 (3 months ago 748 and 9 months ago ==802). Review of Systems:  Constitutional: negative for fever and chills  Respiratory: negative for cough and hemoptysis  Cardiovascular:   Gastrointestinal: negative for abdominal pain, diarrhea, nausea and vomiting  Genitourinary:negative for dysuria and hematuria  Derm: negative for rash and skin lesion(s)  Neurological: negative for seizures and tremors  Endocrine: negative for diabetic symptoms including polydipsia and polyuria  Musculoskeletal: negative for CTD  Psychiatric: negative for psychosis and major depression    On examination, she was an alert pleasant 77-year-old -American female in no distress skin is warm and dry. Respirations are unlabored. /70   Pulse 64   Resp 16   Ht 5' 3\" (1.6 m)   Wt 202 lb 3.2 oz (91.7 kg)   BMI 35.82 kg/m² . HEENT negative for scleral icterus. Extraocular muscles intact. No facial asymmetry or central cyanosis. Neck without masses or goiter. No bruit or JVD. Cardiac apex not displaced. Rhythm regular. Abdomen normal.  Extremities mild nonpitting bilateral ankle edema. EKG today per Dr. Kya Rose interpretation: Sinus rhythm 64/min. Possible old anteroseptal MI. There is no clinical evidence today for decompensated CHF for volume overload.   The ankle swelling does not have the characteristic changes of right heart failure. BP today is acceptable and she is asked to remain off Norvasc. She may benefit by higher doses of sacubitril/valsartan. Prior to that an echocardiogram will be checked for LV function and she will be seen back in the office. At completion of today's visit, medications include the following:    Current Outpatient Medications:     sacubitril-valsartan (ENTRESTO) 24-26 MG per tablet, Take 1 tablet by mouth 2 times daily, Disp: 60 tablet, Rfl: 5    spironolactone (ALDACTONE) 25 MG tablet, Take 0.5 tablets by mouth daily, Disp: 30 tablet, Rfl: 5    carvedilol (COREG) 25 MG tablet, Take 1 tablet by mouth 2 times daily (with meals), Disp: 60 tablet, Rfl: 5    ferrous sulfate 325 (65 Fe) MG tablet, Take 325 mg by mouth daily (with breakfast), Disp: , Rfl:     furosemide (LASIX) 40 MG tablet, Take 1 tablet by mouth daily, Disp: 60 tablet, Rfl: 5    docusate sodium (RA COL-RITE) 100 MG capsule, Take 100 mg by mouth as needed , Disp: , Rfl:     cyclobenzaprine (FLEXERIL) 10 MG tablet, Take 10 mg by mouth 3 times daily as needed for Muscle spasms, Disp: , Rfl:     vitamin C (ASCORBIC ACID) 500 MG tablet, Take 500 mg by mouth daily, Disp: , Rfl:     FLUoxetine (PROZAC) 20 MG capsule, Take 20 mg by mouth daily, Disp: , Rfl:     insulin glargine (LANTUS) 100 UNIT/ML injection vial, Inject 30 Units into the skin nightly , Disp: , Rfl:     oxyCODONE-acetaminophen (PERCOCET) 7.5-325 MG per tablet, Take 1 tablet by mouth every 6 hours as needed for Pain. , Disp: , Rfl:     amLODIPine (NORVASC) 10 MG tablet, Take 10 mg by mouth daily, Disp: , Rfl:       Note: This report was completed utilizing computer voice recognition software. Every effort has been made to ensure accuracy, however; inadvertent computerized transcription errors may be present.     --Michael Johnson MD on 11/11/2020 at 4:43 PM

## 2020-12-07 RX ORDER — FUROSEMIDE 40 MG/1
TABLET ORAL
Qty: 60 TABLET | Refills: 5 | Status: SHIPPED
Start: 2020-12-07 | End: 2021-06-08

## 2020-12-21 ENCOUNTER — HOSPITAL ENCOUNTER (OUTPATIENT)
Dept: CARDIOLOGY | Age: 70
Discharge: HOME | End: 2020-12-21
Payer: MEDICARE

## 2020-12-21 PROCEDURE — 93308 TTE F-UP OR LMTD: CPT

## 2021-01-01 ENCOUNTER — HOSPITAL ENCOUNTER (OUTPATIENT)
Dept: CARDIOLOGY | Age: 71
Discharge: HOME | End: 2021-08-25
Payer: MEDICARE

## 2021-01-01 ENCOUNTER — TELEPHONE (OUTPATIENT)
Dept: CARDIOLOGY | Age: 71
End: 2021-01-01

## 2021-01-01 ENCOUNTER — OFFICE VISIT (OUTPATIENT)
Dept: CARDIOLOGY CLINIC | Age: 71
End: 2021-01-01
Payer: MEDICARE

## 2021-01-01 ENCOUNTER — TELEPHONE (OUTPATIENT)
Dept: CARDIOLOGY CLINIC | Age: 71
End: 2021-01-01

## 2021-01-01 ENCOUNTER — IMMUNIZATION (OUTPATIENT)
Dept: PRIMARY CARE CLINIC | Age: 71
End: 2021-01-01
Payer: MEDICARE

## 2021-01-01 VITALS
RESPIRATION RATE: 16 BRPM | HEART RATE: 63 BPM | DIASTOLIC BLOOD PRESSURE: 60 MMHG | SYSTOLIC BLOOD PRESSURE: 114 MMHG | BODY MASS INDEX: 35.07 KG/M2 | HEIGHT: 63 IN | WEIGHT: 197.9 LBS

## 2021-01-01 DIAGNOSIS — M54.40 CHRONIC LOW BACK PAIN WITH SCIATICA, SCIATICA LATERALITY UNSPECIFIED, UNSPECIFIED BACK PAIN LATERALITY: ICD-10-CM

## 2021-01-01 DIAGNOSIS — K25.4 GASTROINTESTINAL HEMORRHAGE ASSOCIATED WITH GASTRIC ULCER: ICD-10-CM

## 2021-01-01 DIAGNOSIS — R93.1 ABNORMAL ECHOCARDIOGRAM: ICD-10-CM

## 2021-01-01 DIAGNOSIS — I42.0 DILATED CARDIOMYOPATHY (HCC): ICD-10-CM

## 2021-01-01 DIAGNOSIS — R06.09 DOE (DYSPNEA ON EXERTION): ICD-10-CM

## 2021-01-01 DIAGNOSIS — I51.7 LVH (LEFT VENTRICULAR HYPERTROPHY): ICD-10-CM

## 2021-01-01 DIAGNOSIS — R06.02 SHORTNESS OF BREATH: ICD-10-CM

## 2021-01-01 DIAGNOSIS — R94.30 EJECTION FRACTION < 50%: ICD-10-CM

## 2021-01-01 DIAGNOSIS — R94.31 ABNORMAL EKG: ICD-10-CM

## 2021-01-01 DIAGNOSIS — R53.83 OTHER FATIGUE: ICD-10-CM

## 2021-01-01 DIAGNOSIS — E11.649 UNCONTROLLED TYPE 2 DIABETES MELLITUS WITH HYPOGLYCEMIA WITHOUT COMA (HCC): Chronic | ICD-10-CM

## 2021-01-01 DIAGNOSIS — G89.29 CHRONIC LOW BACK PAIN WITH SCIATICA, SCIATICA LATERALITY UNSPECIFIED, UNSPECIFIED BACK PAIN LATERALITY: ICD-10-CM

## 2021-01-01 DIAGNOSIS — I50.22 CHRONIC SYSTOLIC CONGESTIVE HEART FAILURE (HCC): ICD-10-CM

## 2021-01-01 DIAGNOSIS — I25.10 CORONARY ARTERY DISEASE INVOLVING NATIVE CORONARY ARTERY OF NATIVE HEART WITHOUT ANGINA PECTORIS: Primary | ICD-10-CM

## 2021-01-01 DIAGNOSIS — I10 ESSENTIAL HYPERTENSION: ICD-10-CM

## 2021-01-01 PROCEDURE — G8427 DOCREV CUR MEDS BY ELIG CLIN: HCPCS | Performed by: INTERNAL MEDICINE

## 2021-01-01 PROCEDURE — G8400 PT W/DXA NO RESULTS DOC: HCPCS | Performed by: INTERNAL MEDICINE

## 2021-01-01 PROCEDURE — 1036F TOBACCO NON-USER: CPT | Performed by: INTERNAL MEDICINE

## 2021-01-01 PROCEDURE — G8417 CALC BMI ABV UP PARAM F/U: HCPCS | Performed by: INTERNAL MEDICINE

## 2021-01-01 PROCEDURE — 4040F PNEUMOC VAC/ADMIN/RCVD: CPT | Performed by: INTERNAL MEDICINE

## 2021-01-01 PROCEDURE — 93000 ELECTROCARDIOGRAM COMPLETE: CPT | Performed by: INTERNAL MEDICINE

## 2021-01-01 PROCEDURE — 93308 TTE F-UP OR LMTD: CPT | Performed by: PSYCHIATRY & NEUROLOGY

## 2021-01-01 PROCEDURE — G8484 FLU IMMUNIZE NO ADMIN: HCPCS | Performed by: INTERNAL MEDICINE

## 2021-01-01 PROCEDURE — 1090F PRES/ABSN URINE INCON ASSESS: CPT | Performed by: INTERNAL MEDICINE

## 2021-01-01 PROCEDURE — 99214 OFFICE O/P EST MOD 30 MIN: CPT | Performed by: INTERNAL MEDICINE

## 2021-01-01 PROCEDURE — 91300 COVID-19, PFIZER VACCINE 30MCG/0.3ML DOSE: CPT | Performed by: FAMILY MEDICINE

## 2021-01-01 PROCEDURE — 2022F DILAT RTA XM EVC RTNOPTHY: CPT | Performed by: INTERNAL MEDICINE

## 2021-01-01 PROCEDURE — 3046F HEMOGLOBIN A1C LEVEL >9.0%: CPT | Performed by: INTERNAL MEDICINE

## 2021-01-01 PROCEDURE — 0004A COVID-19, PFIZER VACCINE 30MCG/0.3ML DOSE: CPT | Performed by: FAMILY MEDICINE

## 2021-01-01 PROCEDURE — 3017F COLORECTAL CA SCREEN DOC REV: CPT | Performed by: INTERNAL MEDICINE

## 2021-01-01 PROCEDURE — 1123F ACP DISCUSS/DSCN MKR DOCD: CPT | Performed by: INTERNAL MEDICINE

## 2021-01-01 RX ORDER — ROSUVASTATIN CALCIUM 20 MG/1
20 TABLET, COATED ORAL DAILY
Qty: 90 TABLET | Refills: 1 | Status: SHIPPED
Start: 2021-01-01 | End: 2022-01-01 | Stop reason: SDUPTHER

## 2021-01-01 RX ORDER — CARVEDILOL 25 MG/1
25 TABLET ORAL 2 TIMES DAILY WITH MEALS
Qty: 60 TABLET | Refills: 5 | Status: SHIPPED
Start: 2021-01-01 | End: 2022-01-01 | Stop reason: SDUPTHER

## 2021-01-18 ENCOUNTER — TELEPHONE (OUTPATIENT)
Dept: CARDIOLOGY CLINIC | Age: 71
End: 2021-01-18

## 2021-01-18 NOTE — TELEPHONE ENCOUNTER
Contacted patient with echo results and recommendations per Dr. Jeimy Small. She verbalized understanding. Letter forwarded to Dr. Halima Cabral.    ----- Message from Anna Malcolm MD sent at 1/17/2021  5:53 PM EST -----    Lilian please tell the patient that the echo shows the strength of his heart remains below normal and therefore she should benefit by higher dose of Entresto. She should be off Norvasc.   We will see her back in about a month

## 2021-01-22 ENCOUNTER — TELEPHONE (OUTPATIENT)
Dept: CARDIOLOGY CLINIC | Age: 71
End: 2021-01-22

## 2021-01-22 DIAGNOSIS — I50.21 ACUTE SYSTOLIC CHF (CONGESTIVE HEART FAILURE) (HCC): Primary | ICD-10-CM

## 2021-01-22 NOTE — TELEPHONE ENCOUNTER
----- Message from Shannon Berger MD sent at 1/22/2021  1:54 PM EST -----  This does not sound typical for a Entresto side effect. On a trial basis she can stop it particularly if it is the low dose.   However, she needs a BMP as soon as possible

## 2021-01-22 NOTE — TELEPHONE ENCOUNTER
Contacted patient's daughter with recommendations per Dr. Cruz Ferrell. She verbalized understanding.

## 2021-01-23 ENCOUNTER — HOSPITAL ENCOUNTER (OUTPATIENT)
Age: 71
Discharge: HOME | End: 2021-01-23
Payer: MEDICARE

## 2021-01-23 DIAGNOSIS — I50.21 ACUTE SYSTOLIC CHF (CONGESTIVE HEART FAILURE) (HCC): ICD-10-CM

## 2021-01-23 LAB
ANION GAP SERPL CALCULATED.3IONS-SCNC: 7 MMOL/L (ref 7–16)
BUN BLDV-MCNC: 20 MG/DL (ref 8–23)
CALCIUM SERPL-MCNC: 8.9 MG/DL (ref 8.6–10.2)
CHLORIDE BLD-SCNC: 99 MMOL/L (ref 98–107)
CO2: 30 MMOL/L (ref 22–29)
CREAT SERPL-MCNC: 1.2 MG/DL (ref 0.5–1)
GFR AFRICAN AMERICAN: 53
GFR NON-AFRICAN AMERICAN: 53 ML/MIN/1.73
GLUCOSE BLD-MCNC: 339 MG/DL (ref 74–99)
POTASSIUM SERPL-SCNC: 4.1 MMOL/L (ref 3.5–5)
SODIUM BLD-SCNC: 136 MMOL/L (ref 132–146)

## 2021-01-23 PROCEDURE — 36415 COLL VENOUS BLD VENIPUNCTURE: CPT

## 2021-01-23 PROCEDURE — 80048 BASIC METABOLIC PNL TOTAL CA: CPT

## 2021-01-25 ENCOUNTER — TELEPHONE (OUTPATIENT)
Dept: CARDIOLOGY CLINIC | Age: 71
End: 2021-01-25

## 2021-01-25 NOTE — TELEPHONE ENCOUNTER
Contacted patient's daughter, Katherine Mayen, with lab results and recommendations per Dr. Harvey Gutierres. Katherine Mayen verbalized understanding, but does not wish to wait until Dr. Harvey Gutierres returns to discuss patient's echo results and medication changes. Patient has been scheduled with Dr. Radha Beal on 1/28/2021.

## 2021-01-25 NOTE — TELEPHONE ENCOUNTER
----- Message from Shannon Berger MD sent at 1/24/2021  2:52 PM EST -----  Lilian please tell the patient that her laboratories and renal function are all stable. However her blood sugar is significantly elevated. These results should be sent to her PCP.   We should see her back in the office in the next month or so

## 2021-01-28 ENCOUNTER — OFFICE VISIT (OUTPATIENT)
Dept: CARDIOLOGY CLINIC | Age: 71
End: 2021-01-28
Payer: MEDICARE

## 2021-01-28 VITALS
DIASTOLIC BLOOD PRESSURE: 78 MMHG | RESPIRATION RATE: 16 BRPM | OXYGEN SATURATION: 98 % | BODY MASS INDEX: 34.45 KG/M2 | HEIGHT: 64 IN | SYSTOLIC BLOOD PRESSURE: 132 MMHG | WEIGHT: 201.8 LBS | HEART RATE: 70 BPM

## 2021-01-28 DIAGNOSIS — I25.10 CORONARY ARTERY DISEASE INVOLVING NATIVE CORONARY ARTERY OF NATIVE HEART WITHOUT ANGINA PECTORIS: ICD-10-CM

## 2021-01-28 DIAGNOSIS — I25.5 ISCHEMIC CARDIOMYOPATHY: Primary | ICD-10-CM

## 2021-01-28 DIAGNOSIS — E78.5 HYPERLIPIDEMIA, UNSPECIFIED HYPERLIPIDEMIA TYPE: ICD-10-CM

## 2021-01-28 PROCEDURE — 1123F ACP DISCUSS/DSCN MKR DOCD: CPT | Performed by: INTERNAL MEDICINE

## 2021-01-28 PROCEDURE — 93000 ELECTROCARDIOGRAM COMPLETE: CPT | Performed by: INTERNAL MEDICINE

## 2021-01-28 PROCEDURE — G8400 PT W/DXA NO RESULTS DOC: HCPCS | Performed by: INTERNAL MEDICINE

## 2021-01-28 PROCEDURE — 1036F TOBACCO NON-USER: CPT | Performed by: INTERNAL MEDICINE

## 2021-01-28 PROCEDURE — 3017F COLORECTAL CA SCREEN DOC REV: CPT | Performed by: INTERNAL MEDICINE

## 2021-01-28 PROCEDURE — 1090F PRES/ABSN URINE INCON ASSESS: CPT | Performed by: INTERNAL MEDICINE

## 2021-01-28 PROCEDURE — G8484 FLU IMMUNIZE NO ADMIN: HCPCS | Performed by: INTERNAL MEDICINE

## 2021-01-28 PROCEDURE — 4040F PNEUMOC VAC/ADMIN/RCVD: CPT | Performed by: INTERNAL MEDICINE

## 2021-01-28 PROCEDURE — G8427 DOCREV CUR MEDS BY ELIG CLIN: HCPCS | Performed by: INTERNAL MEDICINE

## 2021-01-28 PROCEDURE — 99215 OFFICE O/P EST HI 40 MIN: CPT | Performed by: INTERNAL MEDICINE

## 2021-01-28 PROCEDURE — G8417 CALC BMI ABV UP PARAM F/U: HCPCS | Performed by: INTERNAL MEDICINE

## 2021-01-28 RX ORDER — ROSUVASTATIN CALCIUM 20 MG/1
20 TABLET, COATED ORAL DAILY
Qty: 90 TABLET | Refills: 1 | Status: SHIPPED
Start: 2021-01-28 | End: 2021-01-01 | Stop reason: SDUPTHER

## 2021-01-28 RX ORDER — ASPIRIN 81 MG/1
81 TABLET ORAL DAILY
Qty: 90 TABLET | Refills: 1 | Status: SHIPPED
Start: 2021-01-28 | End: 2022-01-01 | Stop reason: SDUPTHER

## 2021-01-28 NOTE — PROGRESS NOTES
Adult   Pulse: 70   Resp: 16   SpO2: 98%   Weight: 201 lb 12.8 oz (91.5 kg)   Height: 5' 4\" (1.626 m)       Social History     Socioeconomic History    Marital status:      Spouse name: Not on file    Number of children: Not on file    Years of education: Not on file    Highest education level: Not on file   Occupational History    Not on file   Social Needs    Financial resource strain: Not on file    Food insecurity     Worry: Not on file     Inability: Not on file   [employer] needs     Medical: Not on file     Non-medical: Not on file   Tobacco Use    Smoking status: Never Smoker    Smokeless tobacco: Never Used   Substance and Sexual Activity    Alcohol use: No    Drug use: No    Sexual activity: Not Currently   Lifestyle    Physical activity     Days per week: Not on file     Minutes per session: Not on file    Stress: Not on file   Relationships    Social connections     Talks on phone: Not on file     Gets together: Not on file     Attends Yarsani service: Not on file     Active member of club or organization: Not on file     Attends meetings of clubs or organizations: Not on file     Relationship status: Not on file    Intimate partner violence     Fear of current or ex partner: Not on file     Emotionally abused: Not on file     Physically abused: Not on file     Forced sexual activity: Not on file   Other Topics Concern    Not on file   Social History Narrative    Not on file       Family History   Problem Relation Age of Onset    Kidney Disease Mother     Heart Failure Mother     Other Father         aneurysm    Other Sister         scleroderma    Cancer Brother         throat         SUBJECTIVE: Chante Oconto presents to the office today for re-evaluation of cardiac diagnoses. DR Dave Penaloza PATIENT. I reviewed records, and looked at the actual images from her 2019 cath, and her subsequent echoes.    She complains of intolerance to higher dose Entresto ( odd constellation of symptoms) and denies   chest pain, orthopnea and syncope. She also mentions she sleeps a lot. Neg MANDEEP test per patient. Does not exercise. Review of Systems:   Heart: as above   Lungs: as above   Eyes: denies changes in vision or discharge. Ears: denies changes in hearing or pain. Nose: denies epistaxis or masses   Throat: denies sore throat or trouble swallowing. Neuro: denies numbness, tingling, tremors. Skin: denies rashes or itching. : denies hematuria, dysuria   GI: denies vomiting, diarrhea   Psych: denies mood changed, anxiety, depression. all others negative. Physical Exam   /78 (Site: Left Upper Arm, Position: Sitting, Cuff Size: Medium Adult)   Pulse 70   Resp 16   Ht 5' 4\" (1.626 m)   Wt 201 lb 12.8 oz (91.5 kg)   SpO2 98%   BMI 34.64 kg/m²   Constitutional: Oriented to person, place, and time. Overweight  No distress. Head: Normocephalic and atraumatic. Eyes: EOM are normal. Pupils are equal, round, and reactive to light. Neck: Normal range of motion. Neck supple. No hepatojugular reflux and no JVD present. Carotid bruit is not present. No tracheal deviation present. No thyromegaly present. Cardiovascular: Normal rate, regular rhythm, normal heart sounds and intact distal pulses. Exam reveals no gallop and no friction rub. No murmur heard. Pulmonary/Chest: Effort normal and breath sounds normal. No respiratory distress. No wheezes. No rales. No tenderness. Abdominal: Soft. Bowel sounds are normal. No distension and no mass. No tenderness. No rebound and no guarding. Musculoskeletal: Normal range of motion. Trace left ankle/shin edema and no tenderness. Neurological: Alert and oriented to person, place, and time. Skin: Skin is warm and dry. No rash noted. Not diaphoretic. No erythema. Psychiatric: Normal mood and affect.  Behavior is normal.     EKG:  normal sinus rhythm, rate 70 bpm, axis +78, septal MI pattern, unchanged from previous tracings. ASSESSMENT AND PLAN:  Patient Active Problem List   Diagnosis    Acute blood loss anemia    Diabetes mellitus type 2, uncontrolled (HCC)    GI bleed    Acute respiratory failure with hypoxia (HCC)    Chronic back pain    Community acquired pneumonia    Bacterial pneumonia    Essential hypertension    Tachycardia    Acute and chronic respiratory failure with hypoxia (HCC)    Acute pulmonary edema (HCC)    Dilated cardiomyopathy (HCC)    Acute systolic CHF (congestive heart failure) ([facility] [address]     Patient with ischemic cardiomyopathy with cath 2019 demonstrating occluded LAD, but no other obstructive disease, and a dense wall motion abnormality in LAD territory on V-gram.  Her most recent echo demonstrates similar findings, with an EF to my estimate at 40%  She did not tolerate upward titration of Entresto  She is euvolemic today on ARNI, full dose carvedilol, and MRA  Recent renal function and K acceptable on present meds  She should also be on a low dose ASA and high intensity statin - I cannot find out from chart why not        The patient was educated as to the symptoms that would require a prompt return to our office. Return visit in 6 months with DR QUINONES .     Milagro Nam, M.D  [id number] [facility]

## 2021-02-02 RX ORDER — CARVEDILOL 25 MG/1
25 TABLET ORAL 2 TIMES DAILY WITH MEALS
Qty: 60 TABLET | Refills: 5 | Status: SHIPPED | OUTPATIENT
Start: 2021-02-02 | End: 2021-01-01 | Stop reason: SDUPTHER

## 2021-02-03 ENCOUNTER — TELEPHONE (OUTPATIENT)
Dept: CARDIOLOGY CLINIC | Age: 71
End: 2021-02-03

## 2021-02-03 DIAGNOSIS — E78.5 HYPERLIPIDEMIA, UNSPECIFIED HYPERLIPIDEMIA TYPE: Primary | ICD-10-CM

## 2021-03-10 ENCOUNTER — OFFICE VISIT (OUTPATIENT)
Dept: CARDIOLOGY CLINIC | Age: 71
End: 2021-03-10
Payer: MEDICARE

## 2021-03-10 VITALS
DIASTOLIC BLOOD PRESSURE: 66 MMHG | BODY MASS INDEX: 34.55 KG/M2 | HEART RATE: 58 BPM | WEIGHT: 195 LBS | SYSTOLIC BLOOD PRESSURE: 118 MMHG | HEIGHT: 63 IN | RESPIRATION RATE: 16 BRPM

## 2021-03-10 DIAGNOSIS — G47.30 SLEEP APNEA, UNSPECIFIED TYPE: ICD-10-CM

## 2021-03-10 DIAGNOSIS — I10 ESSENTIAL HYPERTENSION: ICD-10-CM

## 2021-03-10 DIAGNOSIS — I42.0 DILATED CARDIOMYOPATHY (HCC): Primary | ICD-10-CM

## 2021-03-10 PROCEDURE — G8484 FLU IMMUNIZE NO ADMIN: HCPCS | Performed by: INTERNAL MEDICINE

## 2021-03-10 PROCEDURE — 1036F TOBACCO NON-USER: CPT | Performed by: INTERNAL MEDICINE

## 2021-03-10 PROCEDURE — G8400 PT W/DXA NO RESULTS DOC: HCPCS | Performed by: INTERNAL MEDICINE

## 2021-03-10 PROCEDURE — 99213 OFFICE O/P EST LOW 20 MIN: CPT | Performed by: INTERNAL MEDICINE

## 2021-03-10 PROCEDURE — 1123F ACP DISCUSS/DSCN MKR DOCD: CPT | Performed by: INTERNAL MEDICINE

## 2021-03-10 PROCEDURE — 3017F COLORECTAL CA SCREEN DOC REV: CPT | Performed by: INTERNAL MEDICINE

## 2021-03-10 PROCEDURE — G8417 CALC BMI ABV UP PARAM F/U: HCPCS | Performed by: INTERNAL MEDICINE

## 2021-03-10 PROCEDURE — 1090F PRES/ABSN URINE INCON ASSESS: CPT | Performed by: INTERNAL MEDICINE

## 2021-03-10 PROCEDURE — G8427 DOCREV CUR MEDS BY ELIG CLIN: HCPCS | Performed by: INTERNAL MEDICINE

## 2021-03-10 PROCEDURE — 93000 ELECTROCARDIOGRAM COMPLETE: CPT | Performed by: INTERNAL MEDICINE

## 2021-03-10 PROCEDURE — 4040F PNEUMOC VAC/ADMIN/RCVD: CPT | Performed by: INTERNAL MEDICINE

## 2021-03-10 NOTE — PROGRESS NOTES
[facility]  [facility]. Laura Grande M.D. Noe Stevens M.D.  Jesus Penaloza. [address] [place] [address], M.D. Shahriar Martines M.D. Naheed Galloway M.D. MD Olivia Khalil   1950  Raquel Portillo,     This 78-year-old woman is seen today for outpatient cardiac follow-up. She has a historyShe has a history of hypertension and depressed ejection fraction. She had clinical and echo improvement on guideline medications. She was evaluated by Dr. Franklyn Stevens 01/20/2021. It was felt that she was having possible side effects (although not typical) from [facility] and the dose was decreased to 24/26BID. She subsequently had improvement in symptoms although she continues to note exertional dyspnea. She denies orthopnea or PND. History:    1. Admitted 10/20/2019 via ED for dyspnea and fatigue   present for 24/36 hours   ED /119.  Sinus tachycardia 114.  Chest CT \"groundglass\".  ProBNP 2832.  Troponin normal.  D-dimer 6754.  Prolactin 0.9.  2. HTN  3. T2DM   4. Asthma  5. Fibromyalgia. 6. Iron deficiency anemia (prn iron infusions). 7. Depression. 8. Echo  LVEF = 25%.  10/20/2019  9. Cardiac catheterization 10/23/2019 showed no hemodynamically significant coronary artery disease. 10. Wearable cardio defibrillator applied 10/24/2019. 11. Outpatient cardiac follow-up, 11/07/2019. Coreg increased to 12.5 twice daily. 12. Outpatient cardiac follow-up, 11/20/2019. Coreg increased to 25 mg twice daily. 13. Labs, 12/12/2019. BUN= 37.  Creatinine= 1.3.  K=4.0.  proBNP= 1159. 14. Echo, 02/05/2020. RADHA.  Biplane EF 59%.  Apical hypokinesis.  LifeVest removed  15. Labs, 02/06/2020.  ProBNP= 802,  K= 5.5 with normal  BUN and creatinine.    16. Outpatient cardiac follow-up, 02/06/2020. EF biplane 59%. .  Defibrillator discontinued.    17.  Outpatient cardiac assessment, 02/25/2020. Still feels dyspneic.  BMP and proBNP checked. Korey Mccullough 24/49 started.  Initiated after 48-hour washout of lisinopril. 18. Outpatient cardiac follow-up 03/10/2020: Minimal dyspnea.  K4. 6.  BUN 26.  Creatinine 1.0.  proBNP 802 (02/06/2020). 19. Phone call 04/21/2020: Asymptomatic.  No medication changes  20. Outpatient cardiac assessment 07/21/2020: No medication change  21. Worsening hypertension. Norvasc started by Dr. Tala Salas 09/30/2020  22. Labs 09/29/2020: H/H 11/35. BUN 18. Creatinine 1. 21.  K4.0.  23. Chest CT, 10/11/2020. Lungs are clear.  Heart not dilated.  Coronary artery calcification present. Aortic valve calcified.  No pericardial effusion.  Thoracic aorta not dilated. 24. Labs, 11/05/2020. BUN= 24, creatinine =1.3, proBNP= 373 (3 months ago 748 and 9 months ago ==802). 25. Echocardiogram limited, 01/20/2021. LVEF 45%.  Apical hypokinesis.  No dilatation. Review of Systems:  Constitutional: negative for fever and chills  Respiratory: negative for cough and hemoptysis  Cardiovascular:   Gastrointestinal: negative for abdominal pain, diarrhea, nausea and vomiting  Genitourinary:negative for dysuria and hematuria  Derm: negative for rash and skin lesion(s)  Neurological: negative for seizures and tremors  Endocrine: negative for diabetic symptoms including polydipsia and polyuria  Musculoskeletal: negative for CTD  Psychiatric: negative for psychosis and major depression    On examination, she is an alert pleasant elderly woman in no distress. Skin is warm and dry. Respirations are unlabored. /66   Pulse 58   Resp 16   Ht 5' 3\" (1.6 m)   Wt 195 lb (88.5 kg)   BMI 34.54 kg/m² . HEENT negative for scleral icterus. Extraocular muscles intact. No facial asymmetry or central cyanosis. Neck without masses or goiter. No bruit or JVD. Cardiac apex not displaced. Rhythm regular. Abdomen normal.  Extremities without edema. Lungs are clear    EKG today per Dr. Caprice Ramachandran perspective 6 more problem that you also factor.   Seen last week for reflux and man review: Sinus rhythm 58/min. Old anterior MI. ME interval 158. QRS duration 106 Axis normal        Patient's medications are reviewed today and no changes made. It is unfortunate that she has been unable to tolerate guideline driven doses of sacubitril/valsartan. Sometimes in these cases there may be additional issues such as sleep apnea although she denies snoring she does have daytime somnolence. A sleep study will be requested and subsequently further recommendations made. This was reviewed with the patient and family at length and the rationale explained. Questions are answered.     At completion of today's visit, medications include the following:    Current Outpatient Medications:     sacubitril-valsartan (ENTRESTO) 24-26 MG per tablet, Take 1 tablet by mouth 2 times daily, Disp: 60 tablet, Rfl: 5    carvedilol (COREG) 25 MG tablet, Take 1 tablet by mouth 2 times daily (with meals), Disp: 60 tablet, Rfl: 5    aspirin EC 81 MG EC tablet, Take 1 tablet by mouth daily, Disp: 90 tablet, Rfl: 1    rosuvastatin (CRESTOR) 20 MG tablet, Take 1 tablet by mouth daily, Disp: 90 tablet, Rfl: 1    furosemide (LASIX) 40 MG tablet, take 1 tablet by mouth once daily, Disp: 60 tablet, Rfl: 5    spironolactone (ALDACTONE) 25 MG tablet, Take 0.5 tablets by mouth daily, Disp: 30 tablet, Rfl: 5    ferrous sulfate 325 (65 Fe) MG tablet, Take 325 mg by mouth daily (with breakfast), Disp: , Rfl:     docusate sodium (RA COL-RITE) 100 MG capsule, Take 100 mg by mouth as needed , Disp: , Rfl:     cyclobenzaprine (FLEXERIL) 10 MG tablet, Take 10 mg by mouth 3 times daily as needed for Muscle spasms, Disp: , Rfl:     vitamin C (ASCORBIC ACID) 500 MG tablet, Take 500 mg by mouth daily, Disp: , Rfl:     FLUoxetine (PROZAC) 20 MG capsule, Take 20 mg by mouth daily, Disp: , Rfl:     insulin glargine (LANTUS) 100 UNIT/ML injection vial, Inject 30 Units into the skin nightly , Disp: , Rfl:     oxyCODONE-acetaminophen (PERCOCET) 7.5-325 MG per tablet, Take 1 tablet by mouth every 6 hours as needed for Pain. , Disp: , Rfl:       Note: This report was completed utilizing computer voice recognition software. Every effort has been made to ensure accuracy, however; inadvertent computerized transcription errors may be present.     --Jericho Red MD on 3/10/2021 at 10:58 AM

## 2021-03-26 ENCOUNTER — IMMUNIZATION (OUTPATIENT)
Dept: PRIMARY CARE CLINIC | Age: 71
End: 2021-03-26
Payer: MEDICARE

## 2021-03-26 PROCEDURE — 0001A COVID-19, PFIZER VACCINE 30MCG/0.3ML DOSE: CPT | Performed by: PHYSICIAN ASSISTANT

## 2021-03-26 PROCEDURE — 91300 COVID-19, PFIZER VACCINE 30MCG/0.3ML DOSE: CPT | Performed by: PHYSICIAN ASSISTANT

## 2021-04-22 ENCOUNTER — IMMUNIZATION (OUTPATIENT)
Dept: PRIMARY CARE CLINIC | Age: 71
End: 2021-04-22
Payer: MEDICARE

## 2021-04-22 PROCEDURE — 91300 COVID-19, PFIZER VACCINE 30MCG/0.3ML DOSE: CPT | Performed by: NURSE PRACTITIONER

## 2021-04-22 PROCEDURE — 0002A COVID-19, PFIZER VACCINE 30MCG/0.3ML DOSE: CPT | Performed by: NURSE PRACTITIONER

## 2021-04-23 ENCOUNTER — TELEPHONE (OUTPATIENT)
Dept: CARDIOLOGY CLINIC | Age: 71
End: 2021-04-23

## 2021-04-23 NOTE — TELEPHONE ENCOUNTER
Please review and advise on labs scanned into patient's chart. Patient's daughter calling in about putting her back on Lasix's.

## 2021-04-26 ENCOUNTER — TELEPHONE (OUTPATIENT)
Dept: CARDIOLOGY CLINIC | Age: 71
End: 2021-04-26

## 2021-04-26 DIAGNOSIS — R06.02 SHORTNESS OF BREATH: ICD-10-CM

## 2021-04-26 DIAGNOSIS — I25.10 CORONARY ARTERY DISEASE INVOLVING NATIVE CORONARY ARTERY OF NATIVE HEART WITHOUT ANGINA PECTORIS: Primary | ICD-10-CM

## 2021-04-26 NOTE — TELEPHONE ENCOUNTER
Per Dr Shamir Warren most recent labs show creatinine 1.86 with a BUN 29.  This reflects a decline in renal function from previous creatinine of 1.3.  It is therefore recommended that she take Lasix on alternate day and recheck BMP in about 10 days. patient notified and understanding.

## 2021-05-10 DIAGNOSIS — I42.0 DILATED CARDIOMYOPATHY (HCC): ICD-10-CM

## 2021-05-10 DIAGNOSIS — R06.02 SHORTNESS OF BREATH: Primary | ICD-10-CM

## 2021-05-14 ENCOUNTER — HOSPITAL ENCOUNTER (OUTPATIENT)
Age: 71
Discharge: HOME | End: 2021-05-14
Payer: MEDICARE

## 2021-05-14 DIAGNOSIS — I42.0 DILATED CARDIOMYOPATHY (HCC): ICD-10-CM

## 2021-05-14 DIAGNOSIS — R06.02 SHORTNESS OF BREATH: ICD-10-CM

## 2021-05-14 LAB
ANION GAP SERPL CALCULATED.3IONS-SCNC: 8 MMOL/L (ref 7–16)
BUN BLDV-MCNC: 32 MG/DL (ref 6–23)
CALCIUM SERPL-MCNC: 9 MG/DL (ref 8.6–10.2)
CHLORIDE BLD-SCNC: 101 MMOL/L (ref 98–107)
CO2: 26 MMOL/L (ref 22–29)
CREAT SERPL-MCNC: 1.9 MG/DL (ref 0.5–1)
GFR AFRICAN AMERICAN: 31
GFR NON-AFRICAN AMERICAN: 31 ML/MIN/1.73
GLUCOSE BLD-MCNC: 272 MG/DL (ref 74–99)
POTASSIUM SERPL-SCNC: 4.9 MMOL/L (ref 3.5–5)
SODIUM BLD-SCNC: 135 MMOL/L (ref 132–146)

## 2021-05-14 PROCEDURE — 36415 COLL VENOUS BLD VENIPUNCTURE: CPT

## 2021-05-14 PROCEDURE — 80048 BASIC METABOLIC PNL TOTAL CA: CPT

## 2021-05-18 ENCOUNTER — TELEPHONE (OUTPATIENT)
Dept: CARDIOLOGY CLINIC | Age: 71
End: 2021-05-18

## 2021-05-18 NOTE — TELEPHONE ENCOUNTER
Left message for patient's daughter to contact office. Per Dr. Danita Heredia, patient to stop diuretic as it is making her too dehydrated. Repeat BMP 1 week after stopping.

## 2021-06-08 ENCOUNTER — OFFICE VISIT (OUTPATIENT)
Dept: CARDIOLOGY CLINIC | Age: 71
End: 2021-06-08
Payer: MEDICARE

## 2021-06-08 VITALS
WEIGHT: 192.9 LBS | BODY MASS INDEX: 32.93 KG/M2 | HEIGHT: 64 IN | SYSTOLIC BLOOD PRESSURE: 122 MMHG | HEART RATE: 64 BPM | DIASTOLIC BLOOD PRESSURE: 76 MMHG | RESPIRATION RATE: 18 BRPM

## 2021-06-08 DIAGNOSIS — R06.02 SHORTNESS OF BREATH: ICD-10-CM

## 2021-06-08 DIAGNOSIS — I42.0 DILATED CARDIOMYOPATHY (HCC): Primary | ICD-10-CM

## 2021-06-08 DIAGNOSIS — R94.30 EJECTION FRACTION < 50%: ICD-10-CM

## 2021-06-08 LAB
ANION GAP SERPL CALCULATED.3IONS-SCNC: 14 MMOL/L (ref 7–16)
BUN BLDV-MCNC: 21 MG/DL (ref 6–23)
CALCIUM SERPL-MCNC: 9.1 MG/DL (ref 8.6–10.2)
CHLORIDE BLD-SCNC: 105 MMOL/L (ref 98–107)
CO2: 21 MMOL/L (ref 22–29)
CREAT SERPL-MCNC: 1.6 MG/DL (ref 0.5–1)
GFR AFRICAN AMERICAN: 38
GFR NON-AFRICAN AMERICAN: 38 ML/MIN/1.73
GLUCOSE BLD-MCNC: 246 MG/DL (ref 74–99)
POTASSIUM SERPL-SCNC: 4.4 MMOL/L (ref 3.5–5)
SODIUM BLD-SCNC: 140 MMOL/L (ref 132–146)

## 2021-06-08 PROCEDURE — 1036F TOBACCO NON-USER: CPT | Performed by: INTERNAL MEDICINE

## 2021-06-08 PROCEDURE — G8417 CALC BMI ABV UP PARAM F/U: HCPCS | Performed by: INTERNAL MEDICINE

## 2021-06-08 PROCEDURE — 99212 OFFICE O/P EST SF 10 MIN: CPT | Performed by: INTERNAL MEDICINE

## 2021-06-08 PROCEDURE — 93000 ELECTROCARDIOGRAM COMPLETE: CPT | Performed by: INTERNAL MEDICINE

## 2021-06-08 PROCEDURE — 3017F COLORECTAL CA SCREEN DOC REV: CPT | Performed by: INTERNAL MEDICINE

## 2021-06-08 PROCEDURE — 1123F ACP DISCUSS/DSCN MKR DOCD: CPT | Performed by: INTERNAL MEDICINE

## 2021-06-08 PROCEDURE — 1090F PRES/ABSN URINE INCON ASSESS: CPT | Performed by: INTERNAL MEDICINE

## 2021-06-08 PROCEDURE — 36415 COLL VENOUS BLD VENIPUNCTURE: CPT | Performed by: INTERNAL MEDICINE

## 2021-06-08 PROCEDURE — 4040F PNEUMOC VAC/ADMIN/RCVD: CPT | Performed by: INTERNAL MEDICINE

## 2021-06-08 PROCEDURE — G8400 PT W/DXA NO RESULTS DOC: HCPCS | Performed by: INTERNAL MEDICINE

## 2021-06-08 PROCEDURE — G8427 DOCREV CUR MEDS BY ELIG CLIN: HCPCS | Performed by: INTERNAL MEDICINE

## 2021-06-08 NOTE — PROGRESS NOTES
[facility]  [facility]. Tavon Gan M.D. Joao Stern M.D.  [facility]. Aman Quispe M.D. Virginia Smith M.D. Stephie Pearson M.D. MD Maddi Hajich   1950  Abdirahman Willis, DO    This 66-year-old woman is seen today for outpatient cardiac follow-up. She has some apparent history of intolerance to sacubitril/valsartan 49/51. This resolved with a lower dose. She continues to be short of breath with activities such as several steps. She has a history of hypertension and depressed ejection fraction. She had clinical and echo improvement on guideline medications. She was evaluated by Dr. Austin Stern 01/20/2021. It was felt that she was having possible side effects (although not typical) from [facility] and the dose was decreased to 24/26BID. She subsequently had improvement in symptoms although she continues to note exertional dyspnea. She denies orthopnea or PND. History:    1. Admitted 10/20/2019 via ED for dyspnea and fatigue   present for 24/36 hours   ED /119.  Sinus tachycardia 114.  Chest CT \"groundglass\".  ProBNP 2832.  Troponin normal.  D-dimer 9994.  Prolactin 0.9.  2. HTN  3. T2DM   4. Asthma  5. Fibromyalgia. 6. Iron deficiency anemia (prn iron infusions). 7. Depression. 8. Echo  LVEF = 25%.  10/20/2019  9. Cardiac catheterization 10/23/2019 showed no hemodynamically significant coronary artery disease. 10. Wearable cardio defibrillator applied 10/24/2019. 11. Outpatient cardiac follow-up, 11/07/2019. Coreg increased to 12.5 twice daily. 12. Outpatient cardiac follow-up, 11/20/2019. Coreg increased to 25 mg twice daily. 13. Labs, 12/12/2019. BUN= 37.  Creatinine= 1.3.  K=4.0.  proBNP= 1159. 14. Echo, 02/05/2020. RADHA.  Biplane EF 59%.  Apical hypokinesis.  LifeVest removed  15. Labs, 02/06/2020.  ProBNP= 802,  K= 5.5 with normal  BUN and creatinine.    16.  Outpatient cardiac follow-up, 02/06/2020. EF biplane 59%. .  Defibrillator discontinued.    17. Outpatient cardiac assessment, 02/25/2020. Still feels dyspneic.  BMP and proBNP checked. [place] Ear 24/49 started.  Initiated after 48-hour washout of lisinopril. 18. Outpatient cardiac follow-up 03/10/2020: Minimal dyspnea.  K4. 6.  BUN 26.  Creatinine 1.0.  proBNP 802 (02/06/2020). 19. Phone call 04/21/2020: Asymptomatic.  No medication changes  20. Outpatient cardiac assessment 07/21/2020: No medication change  21. Worsening hypertension. Norvasc started by Dr. Joana Cardona 09/30/2020  22. Labs 09/29/2020: H/H 11/35. BUN 18. Creatinine 1. 21.  K4.0.  23. Chest CT, 10/11/2020. Lungs are clear.  Heart not dilated.  Coronary artery calcification present. Aortic valve calcified.  No pericardial effusion.  Thoracic aorta not dilated. 24. Labs, 11/05/2020. BUN= 24, creatinine =1.3, proBNP= 373 (3 months ago 748 and 9 months ago ==802). 25. Echocardiogram limited, 01/20/2021. LVEF 45%.  Apical hypokinesis.  No dilatation. 26. Labs 05/14/2021: BUN 32. Creatinine 1.9.  K4.9      Review of Systems:  Constitutional: negative for fever and chills  Respiratory: negative for cough and hemoptysis  Cardiovascular:   Gastrointestinal: negative for abdominal pain, diarrhea, nausea and vomiting  Genitourinary:negative for dysuria and hematuria  Derm: negative for rash and skin lesion(s)  Neurological: negative for seizures and tremors  Endocrine: negative for diabetic symptoms including polydipsia and polyuria  Musculoskeletal: negative for CTD  Psychiatric: negative for psychosis and major depression    On examination, she is an alert pleasant elderly woman in no distress. Skin is warm and dry. Respirations are unlabored. /76   Pulse 64   Resp 18   Ht 5' 4\" (1.626 m)   Wt 192 lb 14.4 oz (87.5 kg)   BMI 33.11 kg/m² . HEENT negative for scleral icterus. Extraocular muscles intact. No facial asymmetry or central cyanosis. Neck without masses or goiter. accuracy, however; inadvertent computerized transcription errors may be present.     --Lisa Zapata MD on 6/8/2021 at 1:46 PM

## 2021-06-11 ENCOUNTER — TELEPHONE (OUTPATIENT)
Dept: CARDIOLOGY CLINIC | Age: 71
End: 2021-06-11

## 2021-06-11 NOTE — TELEPHONE ENCOUNTER
Contacted patient with lab results per Dr. Aaron Steele. she verbalized understanding.      ----- Message from Viv Almeida MD sent at 6/11/2021  9:50 AM EDT -----      Tell her that her kidney function is improving compared to 3 weeks ago. This is good news. Send copy to Dr. Joana Cardona.   We will see her back again as scheduled

## 2021-06-25 NOTE — TELEPHONE ENCOUNTER
Patient's daughter, Kosta Portillo, returned our call. She indicates we had spoken in the interim and that patient is doing well at this time. She will follow-up as indicated.

## 2021-07-20 ENCOUNTER — TELEPHONE (OUTPATIENT)
Dept: CARDIOLOGY CLINIC | Age: 71
End: 2021-07-20

## 2021-07-20 NOTE — TELEPHONE ENCOUNTER
[address] patient to be scheduled for limited echo (ordered 6/8/2021). Please contact for scheduling.

## 2021-07-21 NOTE — TELEPHONE ENCOUNTER
CALLED PATIENT TO SCHEDULE ECHO FOR 08-11-21. REVIEWED COVID CHECKLIST WITH PATIENT.     Electronically signed by Marlon Royal on 7/21/2021 at 8:50 AM

## 2021-08-31 NOTE — TELEPHONE ENCOUNTER
----- Message from Nathanael Ramirez MD sent at 8/30/2021 10:15 AM EDT -----  Please arrange to have patient follow-up in 2 to 3 months to discuss. If patient is having symptoms please advise patient to follow-up sooner than later and if symptoms are significant to go to the emergency room.

## 2021-09-01 NOTE — TELEPHONE ENCOUNTER
Please notify patient overall her echocardiogram has not significantly changed from previous echocardiogram.  However we will need to discuss further during follow-up visit and so please arrange for follow-up visit.

## 2021-09-02 NOTE — TELEPHONE ENCOUNTER
Contacted patient's daughter, Yuliya Delarosa, with echo results per Dr. Shannon Peters. She verbalized understanding. Appointment has been scheduled per Dr. Alfie Azevedo instructions.

## 2021-11-03 NOTE — PROGRESS NOTES
Out Patient CARDIOLOGY FOLLOW UP    Name: Kayode Vital    Age: 66 y.o. Date of Service: 11/3/2021      Referring Physician: No admitting provider for patient encounter. Chief Complaint   Patient presents with    Cardiomyopathy     Patient states really fatigued        History of Present Illness: 73-year-old female with history of type 2 diabetes, hypertension, asthma, fibromyalgia, iron deficiency anemia, depression, heart failure with depressed systolic function with echocardiogram in October 2019 revealing EF of 25%. She was placed on Entresto, beta-blocker and spironolactone and systolic function on echocardiogram 2021 revealed EF of 48%. She has been on low-dose Entresto and I discussed up titration of this medication today but the patient and the daughter reports when attempted to increase Ronold Mellow was made last year, the patient has significant side effects to this medication and subsequently the dose of this medicine was decreased back to the lowest intensity dose. Subsequently they are not interested and are uncomfortable in increasing Entresto dose at this time. The patient also has history of coronary artery disease that has been medically managed with last cardiac catheterization in October 2019 revealing chronic total occlusion of LAD with left to left and right-to-left collaterals. She has suspected sleep apnea but has declined sleep study. She reports overall she has done well until recently when she has noticed significant shortness of breath when going up steps, carrying objects as well as when walking fast.  In addition she reports significant fatigue during the day. Given the symptoms of arrange for pharmacologic myocardial perfusion stress test for further evaluation to make sure there is no limb obstructive coronary artery disease. She cannot walk on a treadmill due to chronic back pain and subsequently [employer] is arranged.   I also offered the patient a sleep study evaluation but she declined. Instead she reports she will be following lifestyle modification by going to bed daily instead of staying up all night and watching TV.        History:     1. Admitted 10/20/2019 via ED for dyspnea and fatigue   present for 24/36 hours   ED /119.  Sinus tachycardia 114.  Chest CT \"groundglass\".  ProBNP 2832.  Troponin normal.  D-dimer 5703.  Prolactin 0.9.  2. HTN  3. T2DM   4. Asthma  5. Fibromyalgia. 6. Iron deficiency anemia (prn iron infusions). 7. Depression. 8. Echo  LVEF = 25%.  10/20/2019  9. Cardiac catheterization 10/23/2019 showed no hemodynamically significant coronary artery disease. 10. Wearable cardio defibrillator applied 10/24/2019. 11. Outpatient cardiac follow-up, 11/07/2019. Coreg increased to 12.5 twice daily. 12. Outpatient cardiac follow-up, 11/20/2019. Coreg increased to 25 mg twice daily. 13. Labs, 12/12/2019. BUN= 37.  Creatinine= 1.3.  K=4.0.  proBNP= 1159. 14. Echo, 02/05/2020. RADHA.  Biplane EF 59%.  Apical hypokinesis.  LifeVest removed  15. Labs, 02/06/2020.  ProBNP= 802,  K= 5.5 with normal  BUN and creatinine.    16. Outpatient cardiac follow-up, 02/06/2020. EF biplane 59%. .  Defibrillator discontinued.    17. Outpatient cardiac assessment, 02/25/2020. Still feels dyspneic.  BMP and proBNP checked. Oval Mayur 24/49 started.  Initiated after 48-hour washout of lisinopril. 18. Outpatient cardiac follow-up 03/10/2020: Minimal dyspnea.  K4. 6.  BUN 26.  Creatinine 1.0.  proBNP 802 (02/06/2020). 19. Phone call 04/21/2020: Asymptomatic.  No medication changes  20. Outpatient cardiac assessment 07/21/2020: No medication change  21. Worsening hypertension.  Norvasc started by Dr. Denise Jain 09/30/2020  22. Labs 09/29/2020: H/H 11/35.  BUN 18.  Creatinine 1. 21.  K4.0.  23. Chest CT, 10/11/2020. Lungs are clear.  Heart not dilated.  Coronary artery calcification present. Aortic valve calcified.  No pericardial effusion.  Thoracic aorta not dilated.     24.  Labs, 11/05/2020. BUN= 24, creatinine =1.3, proBNP= 373 (3 months ago 748 and 9 months ago ==802). 25. Echocardiogram limited, 01/20/2021. LVEF 45%.  Apical hypokinesis.  No dilatation. 26. Labs 05/14/2021: BUN 32.   Creatinine 1.9.  K4.9     Review of Systems:   Cardiac: As per HPI  General: Denies fever or chills  Pulmonary: As per HPI  HEENT: Denies runny nose  GI: No complaints  : No complaints  Endocrine: Denies night sweats  Musculoskeletal: No complaints  Skin: Dry skin  Neuro: No complaints  Psych: Denies depression    Past Medical History:  Past Medical History:   Diagnosis Date    Asthma     [facility]est[facility] failure) ([facility]     Diabetes mellitus ([facility] [address]     Diverticular disease     Fibromyalgia muscle pain     Hypertension        Past Surgical History:  Past Surgical History:   Procedure Laterality Date    HYSTERECTOMY         Family History:  Family History   Problem Relation Age of Onset    Kidney Disease Mother     Heart Failure Mother     Other Father         aneurysm    Other Sister         scleroderma    Cancer Brother         throat       Social History:  Social History     Socioeconomic History    Marital status:      Spouse name: Not on file    Number of children: Not on file    Years of education: Not on file    Highest education level: Not on file   Occupational History    Not on file   Tobacco Use    Smoking status: Never Smoker    Smokeless tobacco: Never Used   Vaping Use    Vaping Use: Never used   Substance and Sexual Activity    Alcohol use: No    Drug use: No    Sexual activity: Not Currently   Other Topics Concern    Not on file   Social History Narrative    Not on file     Social Determinants of Health     Financial Resource Strain:     Difficulty of Paying Living Expenses:    Food Insecurity:     Worried About Running Out of Food in the Last Year:     Ran Out of Food in the Last Year:    Transportation Needs:     Lack of Transportation (Medical): needed for Pain. Yes Historical Provider, MD       Current Medications:  Current Outpatient Medications   Medication Sig Dispense Refill    carvedilol (COREG) 25 MG tablet Take 1 tablet by mouth 2 times daily (with meals) 60 tablet 5    rosuvastatin (CRESTOR) 20 MG tablet Take 1 tablet by mouth daily 90 tablet 1    sacubitril-valsartan (ENTRESTO) 24-26 MG per tablet Take 1 tablet by mouth 2 times daily 60 tablet 5    aspirin EC 81 MG EC tablet Take 1 tablet by mouth daily 90 tablet 1    spironolactone (ALDACTONE) 25 MG tablet Take 0.5 tablets by mouth daily 30 tablet 5    ferrous sulfate 325 (65 Fe) MG tablet Take 325 mg by mouth daily (with breakfast)      cyclobenzaprine (FLEXERIL) 10 MG tablet Take 10 mg by mouth 3 times daily as needed for Muscle spasms      vitamin C (ASCORBIC ACID) 500 MG tablet Take 500 mg by mouth daily      FLUoxetine (PROZAC) 20 MG capsule Take 20 mg by mouth daily      insulin glargine (LANTUS) 100 UNIT/ML injection vial Inject 30 Units into the skin nightly       oxyCODONE-acetaminophen (PERCOCET) 7.5-325 MG per tablet Take 1 tablet by mouth every 6 hours as needed for Pain. Current Facility-Administered Medications   Medication Dose Route Frequency Provider Last Rate Last Admin    regadenoson (LEXISCAN) injection 0.4 mg  0.4 mg IntraVENous ONCE PRN Tre Akbar MD             Physical Exam:  /60   Pulse 63   Resp 16   Ht 5' 3\" (1.6 m)   Wt 197 lb 14.4 oz (89.8 kg)   BMI 35.06 kg/m²   Wt Readings from Last 3 Encounters:   11/03/21 197 lb 14.4 oz (89.8 kg)   06/08/21 192 lb 14.4 oz (87.5 kg)   03/10/21 195 lb (88.5 kg)       Appearance: Alert and oriented x3 not in acute distress.   Skin: Dry skin  Head: Atraumatic  Eyes: Intact extraocular muscles   ENMT: Mucous membranes are moist  Neck: Supple  Lungs: Clear to auscultation  Cardiac: Normal S1 and S2  Abdomen: Protuberant  Extremities: Intact range of motion  Neurologic: No focal neurological deficits  Peripheral Pulses: 2+ peripheral pulses    Intake/Output:  No intake or output data in the 24 hours ending 11/03/21 1721  [unfilled]    Laboratory Tests:  No results for input(s): NA, K, CL, CO2, BUN, CREATININE, GLUCOSE, CALCIUM in the last 72 hours. Lab Results   Component Value Date    MG 2.1 10/22/2019    MG 2.2 05/10/2016     No results for input(s): ALKPHOS, ALT, AST, PROT, BILITOT, BILIDIR, LABALBU in the last 72 hours. No results for input(s): WBC, RBC, HGB, HCT, MCV, MCH, MCHC, RDW, PLT, MPV in the last 72 hours. Lab Results   Component Value Date    TROPONINI 0.05 (H) 10/20/2019    TROPONINI 0.05 (H) 10/20/2019    TROPONINI <0.01 10/20/2019     No results for input(s): CKTOTAL, CKMB, CKMBINDEX, TROPHS in the last 72 hours. Lab Results   Component Value Date    INR 1.2 10/24/2019    INR 1.1 10/23/2019    PROTIME 14.1 (H) 10/24/2019    PROTIME 13.3 (H) 10/23/2019     Lab Results   Component Value Date    TSH 0.260 (L) 10/21/2019    TSH 0.988 11/16/2010     Lab Results   Component Value Date    LABA1C 9.7 (H) 10/21/2019    LABA1C 9.7 (H) 09/20/2019    LABA1C 10.4 (H) 11/16/2010     No results found for: EAG  No results found for: CHOL  No results found for: TRIG  No results found for: HDL  No results found for: LDLCALC, LDLCHOLESTEROL  No results found for: LABVLDL, VLDL  No results found for: CHOLHDLRATIO  No results for input(s): PROBNP in the last 72 hours. Cardiac Tests:  EKG reviewed (EKG date: Sinus rhythm, 63 bpm, occasional PVCs, anteroseptal Q waves, lateral Q waves and nonspecific ST-T wave changes.):      Echocardiogram reviewed: 8/25/21   Moderate to severe proximal septal wall thickness (1.5 to 1.6cm). Consider   cardiac MRI for further evaluation if none has been done recently. Akinetic apex   Ejection fraction is visually estimated at 48% by Albarran's method. Mild RVH   Normal right ventricular size and function. Mild tricuspid regurgitation.     Stress test reviewed: 10/22/2019    Impression   1. Large fixed perfusion defect centered at the apex. Small area of   reversible ischemia suggested at the apical lateral wall. 2. Ejection fraction is 33 %. 3. Extensive wall motion abnormality as outlined above. Cardiac catheterization reviewed: 11/2019     ANGIOGRAPHIC RESULTS:  Left main:  No angiographically significant stenosis.     LAD:  Proximal 100% chronic total occlusion with left-to-left and  right-to-left collaterals.     Circumflex:  Mild diffuse luminal irregularities.     OM1:  No angiographically significant stenosis.     OM2:  No angiographically significant stenosis.     Ramus:  Bifurcating vessel. The anterior bifurcation has a mid diffuse  40% stenosis. The anterior bifurcation has no significant stenosis.     Right coronary artery:  Dominant vessel. Minimal diffuse luminal  irregularities. Collaterals supplying the mid, distal, and apical LAD.     LEFT VENTRICULOGRAM:  Mid, distal anterior wall is akinetic. The apical  and inferior apical walls are dyskinetic. Overall, ejection fraction  30%. CXR reviewed: The ASCVD Risk score (Ned Verma, et al., 2013) failed to calculate for the following reasons:    Cannot find a previous HDL lab    Cannot find a previous total cholesterol lab    ASSESSMENT / PLAN:    1. Coronary artery disease involving native coronary artery of native heart without angina pectoris  - Cardiac Stress Test - w/Pharm; Future  She has been on total occlusion of LAD which was found on a cardiac catheter in October 2019 but was noted to have left to left and right-to-left collaterals and subsequently was medically managed. She reports she has done well until recently when she has noticed decreased energy, fatigue with activities, worsening dyspnea on exertion with activities. Subsequently Lexiscan stress test is arranged. 2. SPEARS (dyspnea on exertion)  As mentioned above  3.  Other fatigue  - Cardiac Stress Test - w/Pharm; Future    5. Abnormal EKG  - Cardiac Stress Test - w/Pharm; Future    6. Abnormal echocardiogram  - Cardiac Stress Test - w/Pharm; Future  Recent echocardiogram revealed wall motion abnormalities at the apex  7. Chronic systolic congestive heart failure (HCC)  Denies orthopnea or paroxysmal nocturnal dyspnea  EF on echocardiogram in August 2021 revealed 48%  In 2019 EF was 25%. I offered to increase Entresto to medium intensity but patient and daughter declined as mentioned in HPI  We will discuss possible addition of BiDil during next visit if patient can afford and if she cannot afford potentially to discuss hydralazine and isosorbide mononitrate as further optimization of guideline directed medical therapy    8. LVH (left ventricular hypertrophy)  Recent echocardiogram revealed moderate concentric left ventricular hypertrophy  This is likely due to chronic hypertension  We will however monitor and if ischemic evaluation is unrevealing and patient continues to have symptoms we will discuss possibility of cardiac MRI at that time    9. Dilated cardiomyopathy ([facility] [address]  - [place] Cardiac Stress Test Nuclear Imaging; Future    10. Essential hypertension  Stable    11. Uncontrolled type 2 diabetes mellitus with hypoglycemia without coma ([facility] [address]  Follow-up with your PCP  12. Gastrointestinal hemorrhage associated with gastric ulcer  She has prior GI bleed but denies recurrent  13. Chronic low back pain with sciatica, sciatica laterality unspecified, unspecified back pain laterality             FOLLOW-UP 3 months    Thank you for allowing me to participate in your patient's care. Please feel free to contact me if you have any questions or concerns.     Lisy Garcia MD  [facility]) Cardiology

## 2021-11-17 NOTE — TELEPHONE ENCOUNTER
Spoke with patient to confirm Lexiscan stress test on Nov. 19, 2021 at 0800. Patient states she is ill  And running temp, saw PCP today. Patient unable to keep appointment, will call to reschedule when feeling better.

## 2022-01-01 ENCOUNTER — APPOINTMENT (OUTPATIENT)
Dept: GENERAL RADIOLOGY | Age: 72
DRG: 853 | End: 2022-01-01
Payer: MEDICARE

## 2022-01-01 ENCOUNTER — ANESTHESIA (OUTPATIENT)
Dept: OPERATING ROOM | Age: 72
DRG: 853 | End: 2022-01-01
Payer: MEDICARE

## 2022-01-01 ENCOUNTER — TELEPHONE (OUTPATIENT)
Dept: CARDIOLOGY | Age: 72
End: 2022-01-01

## 2022-01-01 ENCOUNTER — OFFICE VISIT (OUTPATIENT)
Dept: CARDIOLOGY CLINIC | Age: 72
End: 2022-01-01
Payer: MEDICARE

## 2022-01-01 ENCOUNTER — TELEPHONE (OUTPATIENT)
Dept: CARDIOLOGY CLINIC | Age: 72
End: 2022-01-01

## 2022-01-01 ENCOUNTER — ANESTHESIA EVENT (OUTPATIENT)
Dept: OPERATING ROOM | Age: 72
DRG: 853 | End: 2022-01-01
Payer: MEDICARE

## 2022-01-01 ENCOUNTER — APPOINTMENT (OUTPATIENT)
Dept: CT IMAGING | Age: 72
DRG: 853 | End: 2022-01-01
Payer: MEDICARE

## 2022-01-01 ENCOUNTER — HOSPITAL ENCOUNTER (OUTPATIENT)
Dept: CARDIOLOGY | Age: 72
Discharge: HOME | End: 2022-03-17
Payer: MEDICARE

## 2022-01-01 ENCOUNTER — HOSPITAL ENCOUNTER (INPATIENT)
Age: 72
LOS: 4 days | DRG: 853 | End: 2022-07-25
Attending: STUDENT IN AN ORGANIZED HEALTH CARE EDUCATION/TRAINING PROGRAM | Admitting: FAMILY MEDICINE
Payer: MEDICARE

## 2022-01-01 VITALS
HEART RATE: 64 BPM | SYSTOLIC BLOOD PRESSURE: 112 MMHG | DIASTOLIC BLOOD PRESSURE: 78 MMHG | OXYGEN SATURATION: 97 % | WEIGHT: 195 LBS | HEIGHT: 63 IN | RESPIRATION RATE: 18 BRPM | BODY MASS INDEX: 34.55 KG/M2

## 2022-01-01 VITALS
OXYGEN SATURATION: 97 % | SYSTOLIC BLOOD PRESSURE: 117 MMHG | WEIGHT: 208.5 LBS | DIASTOLIC BLOOD PRESSURE: 73 MMHG | HEIGHT: 62 IN | RESPIRATION RATE: 18 BRPM | BODY MASS INDEX: 38.37 KG/M2 | HEART RATE: 75 BPM

## 2022-01-01 VITALS
HEART RATE: 66 BPM | DIASTOLIC BLOOD PRESSURE: 55 MMHG | HEIGHT: 64 IN | SYSTOLIC BLOOD PRESSURE: 107 MMHG | BODY MASS INDEX: 45.07 KG/M2 | RESPIRATION RATE: 18 BRPM | TEMPERATURE: 97.2 F | OXYGEN SATURATION: 99 % | WEIGHT: 264 LBS

## 2022-01-01 VITALS
BODY MASS INDEX: 34.41 KG/M2 | HEART RATE: 62 BPM | RESPIRATION RATE: 12 BRPM | HEIGHT: 62 IN | SYSTOLIC BLOOD PRESSURE: 116 MMHG | DIASTOLIC BLOOD PRESSURE: 70 MMHG | WEIGHT: 187 LBS

## 2022-01-01 DIAGNOSIS — J96.01 ACUTE RESPIRATORY FAILURE WITH HYPOXIA (HCC): ICD-10-CM

## 2022-01-01 DIAGNOSIS — M54.40 CHRONIC BILATERAL LOW BACK PAIN WITH SCIATICA, SCIATICA LATERALITY UNSPECIFIED: ICD-10-CM

## 2022-01-01 DIAGNOSIS — N17.9 AKI (ACUTE KIDNEY INJURY) (HCC): ICD-10-CM

## 2022-01-01 DIAGNOSIS — J96.21 ACUTE AND CHRONIC RESPIRATORY FAILURE WITH HYPOXIA (HCC): ICD-10-CM

## 2022-01-01 DIAGNOSIS — K25.4 GASTROINTESTINAL HEMORRHAGE ASSOCIATED WITH GASTRIC ULCER: ICD-10-CM

## 2022-01-01 DIAGNOSIS — G89.29 CHRONIC BILATERAL LOW BACK PAIN WITH SCIATICA, SCIATICA LATERALITY UNSPECIFIED: ICD-10-CM

## 2022-01-01 DIAGNOSIS — R53.83 OTHER FATIGUE: ICD-10-CM

## 2022-01-01 DIAGNOSIS — R07.89 CHEST PRESSURE: ICD-10-CM

## 2022-01-01 DIAGNOSIS — E11.10 DIABETIC KETOACIDOSIS WITHOUT COMA ASSOCIATED WITH TYPE 2 DIABETES MELLITUS (HCC): ICD-10-CM

## 2022-01-01 DIAGNOSIS — S80.211A ABRASION OF RIGHT KNEE, INITIAL ENCOUNTER: ICD-10-CM

## 2022-01-01 DIAGNOSIS — R65.21 SEPTIC SHOCK (HCC): ICD-10-CM

## 2022-01-01 DIAGNOSIS — E11.641 UNCONTROLLED TYPE 2 DIABETES MELLITUS WITH HYPOGLYCEMIA AND COMA (HCC): Chronic | ICD-10-CM

## 2022-01-01 DIAGNOSIS — R00.0 TACHYCARDIA: ICD-10-CM

## 2022-01-01 DIAGNOSIS — I10 ESSENTIAL HYPERTENSION: ICD-10-CM

## 2022-01-01 DIAGNOSIS — D62 ACUTE BLOOD LOSS ANEMIA: ICD-10-CM

## 2022-01-01 DIAGNOSIS — R06.09 DOE (DYSPNEA ON EXERTION): ICD-10-CM

## 2022-01-01 DIAGNOSIS — E87.20 LACTIC ACIDOSIS: ICD-10-CM

## 2022-01-01 DIAGNOSIS — I42.0 DILATED CARDIOMYOPATHY (HCC): ICD-10-CM

## 2022-01-01 DIAGNOSIS — I25.10 CORONARY ARTERY DISEASE INVOLVING NATIVE CORONARY ARTERY, UNSPECIFIED WHETHER ANGINA PRESENT, UNSPECIFIED WHETHER NATIVE OR TRANSPLANTED HEART: Primary | ICD-10-CM

## 2022-01-01 DIAGNOSIS — R79.89 ELEVATED LFTS: ICD-10-CM

## 2022-01-01 DIAGNOSIS — N49.3: ICD-10-CM

## 2022-01-01 DIAGNOSIS — R07.89 CHEST PRESSURE: Primary | ICD-10-CM

## 2022-01-01 DIAGNOSIS — I50.21 ACUTE SYSTOLIC CHF (CONGESTIVE HEART FAILURE) (HCC): ICD-10-CM

## 2022-01-01 DIAGNOSIS — R63.5 WEIGHT GAIN: ICD-10-CM

## 2022-01-01 DIAGNOSIS — M72.6 NECROTIZING FASCIITIS (HCC): ICD-10-CM

## 2022-01-01 DIAGNOSIS — A41.9 SEPTIC SHOCK (HCC): ICD-10-CM

## 2022-01-01 DIAGNOSIS — J15.9 BACTERIAL PNEUMONIA: ICD-10-CM

## 2022-01-01 DIAGNOSIS — I25.10 CORONARY ARTERY DISEASE INVOLVING NATIVE CORONARY ARTERY OF NATIVE HEART WITHOUT ANGINA PECTORIS: ICD-10-CM

## 2022-01-01 DIAGNOSIS — N76.82 FOURNIER'S GANGRENE IN FEMALE: Primary | ICD-10-CM

## 2022-01-01 DIAGNOSIS — I25.10 CORONARY ARTERY DISEASE INVOLVING NATIVE CORONARY ARTERY OF NATIVE HEART WITHOUT ANGINA PECTORIS: Primary | ICD-10-CM

## 2022-01-01 DIAGNOSIS — M54.40 CHRONIC MIDLINE LOW BACK PAIN WITH SCIATICA, SCIATICA LATERALITY UNSPECIFIED: ICD-10-CM

## 2022-01-01 DIAGNOSIS — N17.9 ACUTE RENAL FAILURE, UNSPECIFIED ACUTE RENAL FAILURE TYPE (HCC): ICD-10-CM

## 2022-01-01 DIAGNOSIS — R93.1 ABNORMAL ECHOCARDIOGRAM: ICD-10-CM

## 2022-01-01 DIAGNOSIS — R60.9 EDEMA, UNSPECIFIED TYPE: ICD-10-CM

## 2022-01-01 DIAGNOSIS — I50.43 ACUTE ON CHRONIC COMBINED SYSTOLIC AND DIASTOLIC CHF (CONGESTIVE HEART FAILURE) (HCC): ICD-10-CM

## 2022-01-01 DIAGNOSIS — R94.31 ABNORMAL EKG: ICD-10-CM

## 2022-01-01 DIAGNOSIS — G89.29 CHRONIC MIDLINE LOW BACK PAIN WITH SCIATICA, SCIATICA LATERALITY UNSPECIFIED: ICD-10-CM

## 2022-01-01 LAB
AADO2: 122.3 MMHG
AADO2: 127.2 MMHG
AADO2: 135 MMHG
AADO2: 136.8 MMHG
AADO2: 167.3 MMHG
AADO2: 226.4 MMHG
ABO/RH: NORMAL
ACANTHOCYTES: ABNORMAL
ACETAMINOPHEN LEVEL: <5 MCG/ML (ref 10–30)
ALBUMIN SERPL-MCNC: 1.1 G/DL (ref 3.5–5.2)
ALBUMIN SERPL-MCNC: 1.2 G/DL (ref 3.5–5.2)
ALBUMIN SERPL-MCNC: 1.4 G/DL (ref 3.5–5.2)
ALBUMIN SERPL-MCNC: 1.5 G/DL (ref 3.5–5.2)
ALBUMIN SERPL-MCNC: 1.8 G/DL (ref 3.5–5.2)
ALBUMIN SERPL-MCNC: 2.2 G/DL (ref 3.5–5.2)
ALBUMIN SERPL-MCNC: 2.3 G/DL (ref 3.5–5.2)
ALP BLD-CCNC: 138 U/L (ref 35–104)
ALP BLD-CCNC: 144 U/L (ref 35–104)
ALP BLD-CCNC: 144 U/L (ref 35–104)
ALP BLD-CCNC: 177 U/L (ref 35–104)
ALP BLD-CCNC: 199 U/L (ref 35–104)
ALP BLD-CCNC: 429 U/L (ref 35–104)
ALP BLD-CCNC: 491 U/L (ref 35–104)
ALP BLD-CCNC: 566 U/L (ref 35–104)
ALP BLD-CCNC: 566 U/L (ref 35–104)
ALP BLD-CCNC: 630 U/L (ref 35–104)
ALP BLD-CCNC: 643 U/L (ref 35–104)
ALT SERPL-CCNC: 39 U/L (ref 0–32)
ALT SERPL-CCNC: 39 U/L (ref 0–32)
ALT SERPL-CCNC: 42 U/L (ref 0–32)
ALT SERPL-CCNC: 43 U/L (ref 0–32)
ALT SERPL-CCNC: 43 U/L (ref 0–32)
ALT SERPL-CCNC: 48 U/L (ref 0–32)
ALT SERPL-CCNC: 52 U/L (ref 0–32)
ALT SERPL-CCNC: 62 U/L (ref 0–32)
ALT SERPL-CCNC: 63 U/L (ref 0–32)
ALT SERPL-CCNC: 66 U/L (ref 0–32)
ALT SERPL-CCNC: 69 U/L (ref 0–32)
AMPHETAMINE SCREEN, URINE: NOT DETECTED
ANAEROBIC CULTURE: ABNORMAL
ANAEROBIC CULTURE: ABNORMAL
ANGLE (CLOT STRENGTH): 75.2 DEGREE (ref 59–74)
ANION GAP SERPL CALCULATED.3IONS-SCNC: 11 MMOL/L (ref 7–16)
ANION GAP SERPL CALCULATED.3IONS-SCNC: 12 MMOL/L (ref 7–16)
ANION GAP SERPL CALCULATED.3IONS-SCNC: 13 MMOL/L (ref 7–16)
ANION GAP SERPL CALCULATED.3IONS-SCNC: 14 MMOL/L (ref 7–16)
ANION GAP SERPL CALCULATED.3IONS-SCNC: 14 MMOL/L (ref 7–16)
ANION GAP SERPL CALCULATED.3IONS-SCNC: 15 MMOL/L (ref 7–16)
ANION GAP SERPL CALCULATED.3IONS-SCNC: 16 MMOL/L (ref 7–16)
ANION GAP SERPL CALCULATED.3IONS-SCNC: 17 MMOL/L (ref 7–16)
ANION GAP SERPL CALCULATED.3IONS-SCNC: 18 MMOL/L (ref 7–16)
ANION GAP SERPL CALCULATED.3IONS-SCNC: 22 MMOL/L (ref 7–16)
ANION GAP SERPL CALCULATED.3IONS-SCNC: 22 MMOL/L (ref 7–16)
ANISOCYTOSIS: ABNORMAL
ANTIBODY SCREEN: NORMAL
AST SERPL-CCNC: 112 U/L (ref 0–31)
AST SERPL-CCNC: 113 U/L (ref 0–31)
AST SERPL-CCNC: 115 U/L (ref 0–31)
AST SERPL-CCNC: 117 U/L (ref 0–31)
AST SERPL-CCNC: 123 U/L (ref 0–31)
AST SERPL-CCNC: 267 U/L (ref 0–31)
AST SERPL-CCNC: 286 U/L (ref 0–31)
AST SERPL-CCNC: 302 U/L (ref 0–31)
AST SERPL-CCNC: 316 U/L (ref 0–31)
AST SERPL-CCNC: 76 U/L (ref 0–31)
AST SERPL-CCNC: 84 U/L (ref 0–31)
B.E.: -10.8 MMOL/L (ref -3–3)
B.E.: -4.2 MMOL/L (ref -3–3)
B.E.: -4.9 MMOL/L (ref -3–3)
B.E.: -5.4 MMOL/L (ref -3–3)
B.E.: -6.8 MMOL/L (ref -3–3)
B.E.: -6.9 MMOL/L (ref -3–3)
B.E.: -7.6 MMOL/L (ref -3–3)
B.E.: -7.7 MMOL/L (ref -3–3)
BACTERIA: ABNORMAL /HPF
BARBITURATE SCREEN URINE: NOT DETECTED
BASOPHILIC STIPPLING: ABNORMAL
BASOPHILS ABSOLUTE: 0 E9/L (ref 0–0.2)
BASOPHILS RELATIVE PERCENT: 0 % (ref 0–2)
BASOPHILS RELATIVE PERCENT: 0.1 % (ref 0–2)
BASOPHILS RELATIVE PERCENT: 0.2 % (ref 0–2)
BASOPHILS RELATIVE PERCENT: 0.6 % (ref 0–2)
BASOPHILS RELATIVE PERCENT: 0.6 % (ref 0–2)
BASOPHILS RELATIVE PERCENT: 0.9 % (ref 0–2)
BENZODIAZEPINE SCREEN, URINE: NOT DETECTED
BETA-HYDROXYBUTYRATE: 0.69 MMOL/L (ref 0.02–0.27)
BILIRUB SERPL-MCNC: 0.8 MG/DL (ref 0–1.2)
BILIRUB SERPL-MCNC: 0.9 MG/DL (ref 0–1.2)
BILIRUB SERPL-MCNC: 0.9 MG/DL (ref 0–1.2)
BILIRUB SERPL-MCNC: 1 MG/DL (ref 0–1.2)
BILIRUB SERPL-MCNC: 1.1 MG/DL (ref 0–1.2)
BILIRUB SERPL-MCNC: 1.2 MG/DL (ref 0–1.2)
BILIRUB SERPL-MCNC: 1.2 MG/DL (ref 0–1.2)
BILIRUBIN URINE: NEGATIVE
BLOOD BANK DISPENSE STATUS: NORMAL
BLOOD BANK PRODUCT CODE: NORMAL
BLOOD CULTURE, ROUTINE: NORMAL
BLOOD, URINE: ABNORMAL
BPU ID: NORMAL
BUN BLDV-MCNC: 54 MG/DL (ref 6–23)
BUN BLDV-MCNC: 54 MG/DL (ref 6–23)
BUN BLDV-MCNC: 55 MG/DL (ref 6–23)
BUN BLDV-MCNC: 56 MG/DL (ref 6–23)
BUN BLDV-MCNC: 56 MG/DL (ref 6–23)
BUN BLDV-MCNC: 57 MG/DL (ref 6–23)
BUN BLDV-MCNC: 58 MG/DL (ref 6–23)
BUN BLDV-MCNC: 59 MG/DL (ref 6–23)
BUN BLDV-MCNC: 60 MG/DL (ref 6–23)
BUN BLDV-MCNC: 60 MG/DL (ref 6–23)
BUN BLDV-MCNC: 61 MG/DL (ref 6–23)
BUN BLDV-MCNC: 64 MG/DL (ref 6–23)
BUN BLDV-MCNC: 66 MG/DL (ref 6–23)
BUN BLDV-MCNC: 67 MG/DL (ref 6–23)
BUN BLDV-MCNC: 68 MG/DL (ref 6–23)
BUN BLDV-MCNC: 69 MG/DL (ref 6–23)
BURR CELLS: ABNORMAL
CALCIUM IONIZED: 0.92 MMOL/L (ref 1.15–1.33)
CALCIUM IONIZED: 0.94 MMOL/L (ref 1.15–1.33)
CALCIUM IONIZED: 1 MMOL/L (ref 1.15–1.33)
CALCIUM IONIZED: 1.02 MMOL/L (ref 1.15–1.33)
CALCIUM IONIZED: 1.02 MMOL/L (ref 1.15–1.33)
CALCIUM IONIZED: 1.06 MMOL/L (ref 1.15–1.33)
CALCIUM IONIZED: 1.08 MMOL/L (ref 1.15–1.33)
CALCIUM IONIZED: 1.22 MMOL/L (ref 1.15–1.33)
CALCIUM SERPL-MCNC: 5.9 MG/DL (ref 8.6–10.2)
CALCIUM SERPL-MCNC: 5.9 MG/DL (ref 8.6–10.2)
CALCIUM SERPL-MCNC: 6.1 MG/DL (ref 8.6–10.2)
CALCIUM SERPL-MCNC: 6.3 MG/DL (ref 8.6–10.2)
CALCIUM SERPL-MCNC: 6.4 MG/DL (ref 8.6–10.2)
CALCIUM SERPL-MCNC: 6.6 MG/DL (ref 8.6–10.2)
CALCIUM SERPL-MCNC: 6.7 MG/DL (ref 8.6–10.2)
CALCIUM SERPL-MCNC: 6.8 MG/DL (ref 8.6–10.2)
CALCIUM SERPL-MCNC: 6.8 MG/DL (ref 8.6–10.2)
CALCIUM SERPL-MCNC: 6.9 MG/DL (ref 8.6–10.2)
CALCIUM SERPL-MCNC: 7.1 MG/DL (ref 8.6–10.2)
CALCIUM SERPL-MCNC: 7.2 MG/DL (ref 8.6–10.2)
CALCIUM SERPL-MCNC: 7.2 MG/DL (ref 8.6–10.2)
CALCIUM SERPL-MCNC: 7.3 MG/DL (ref 8.6–10.2)
CALCIUM SERPL-MCNC: 7.5 MG/DL (ref 8.6–10.2)
CALCIUM SERPL-MCNC: 7.8 MG/DL (ref 8.6–10.2)
CALCIUM SERPL-MCNC: 8 MG/DL (ref 8.6–10.2)
CALCIUM SERPL-MCNC: 8.1 MG/DL (ref 8.6–10.2)
CALCIUM SERPL-MCNC: 8.6 MG/DL (ref 8.6–10.2)
CALCIUM SERPL-MCNC: 8.8 MG/DL (ref 8.6–10.2)
CANNABINOID SCREEN URINE: NOT DETECTED
CHLORIDE BLD-SCNC: 80 MMOL/L (ref 98–107)
CHLORIDE BLD-SCNC: 84 MMOL/L (ref 98–107)
CHLORIDE BLD-SCNC: 85 MMOL/L (ref 98–107)
CHLORIDE BLD-SCNC: 88 MMOL/L (ref 98–107)
CHLORIDE BLD-SCNC: 90 MMOL/L (ref 98–107)
CHLORIDE BLD-SCNC: 90 MMOL/L (ref 98–107)
CHLORIDE BLD-SCNC: 91 MMOL/L (ref 98–107)
CHLORIDE BLD-SCNC: 91 MMOL/L (ref 98–107)
CHLORIDE BLD-SCNC: 92 MMOL/L (ref 98–107)
CHLORIDE BLD-SCNC: 93 MMOL/L (ref 98–107)
CHLORIDE BLD-SCNC: 94 MMOL/L (ref 98–107)
CHLORIDE BLD-SCNC: 95 MMOL/L (ref 98–107)
CHLORIDE BLD-SCNC: 96 MMOL/L (ref 98–107)
CHLORIDE BLD-SCNC: 96 MMOL/L (ref 98–107)
CHLORIDE BLD-SCNC: 97 MMOL/L (ref 98–107)
CHLORIDE BLD-SCNC: 97 MMOL/L (ref 98–107)
CHLORIDE URINE RANDOM: 21 MMOL/L
CHP ED QC CHECK: NORMAL
CLARITY: CLEAR
CO2: 14 MMOL/L (ref 22–29)
CO2: 15 MMOL/L (ref 22–29)
CO2: 15 MMOL/L (ref 22–29)
CO2: 16 MMOL/L (ref 22–29)
CO2: 16 MMOL/L (ref 22–29)
CO2: 17 MMOL/L (ref 22–29)
CO2: 18 MMOL/L (ref 22–29)
CO2: 19 MMOL/L (ref 22–29)
CO2: 20 MMOL/L (ref 22–29)
COCAINE METABOLITE SCREEN URINE: NOT DETECTED
COHB: 0.3 % (ref 0–1.5)
COHB: 0.3 % (ref 0–1.5)
COHB: 0.4 % (ref 0–1.5)
COHB: 0.5 % (ref 0–1.5)
COHB: 0.6 % (ref 0–1.5)
COHB: 1.2 % (ref 0–1.5)
COLOR: YELLOW
CORTISOL TOTAL: 10.99 MCG/DL (ref 2.68–18.4)
CORTISOL TOTAL: 33.69 MCG/DL (ref 2.68–18.4)
CREAT SERPL-MCNC: 2.9 MG/DL (ref 0.5–1)
CREAT SERPL-MCNC: 3 MG/DL (ref 0.5–1)
CREAT SERPL-MCNC: 3.1 MG/DL (ref 0.5–1)
CREAT SERPL-MCNC: 3.2 MG/DL (ref 0.5–1)
CREAT SERPL-MCNC: 3.2 MG/DL (ref 0.5–1)
CREAT SERPL-MCNC: 3.3 MG/DL (ref 0.5–1)
CREAT SERPL-MCNC: 3.3 MG/DL (ref 0.5–1)
CREAT SERPL-MCNC: 3.4 MG/DL (ref 0.5–1)
CREAT SERPL-MCNC: 3.5 MG/DL (ref 0.5–1)
CREAT SERPL-MCNC: 3.6 MG/DL (ref 0.5–1)
CREAT SERPL-MCNC: 3.7 MG/DL (ref 0.5–1)
CREATININE URINE: 144 MG/DL (ref 29–226)
CRITICAL: ABNORMAL
CULTURE, BLOOD 2: NORMAL
DATE ANALYZED: ABNORMAL
DATE OF COLLECTION: ABNORMAL
DESCRIPTION BLOOD BANK: NORMAL
DOHLE BODIES: ABNORMAL
EKG ATRIAL RATE: 86 BPM
EKG P AXIS: 47 DEGREES
EKG P-R INTERVAL: 146 MS
EKG Q-T INTERVAL: 404 MS
EKG QRS DURATION: 100 MS
EKG QTC CALCULATION (BAZETT): 483 MS
EKG R AXIS: -7 DEGREES
EKG T AXIS: 74 DEGREES
EKG VENTRICULAR RATE: 86 BPM
EOSINOPHILS ABSOLUTE: 0 E9/L (ref 0.05–0.5)
EOSINOPHILS ABSOLUTE: 0.23 E9/L (ref 0.05–0.5)
EOSINOPHILS ABSOLUTE: 0.24 E9/L (ref 0.05–0.5)
EOSINOPHILS ABSOLUTE: 0.31 E9/L (ref 0.05–0.5)
EOSINOPHILS RELATIVE PERCENT: 0 % (ref 0–6)
EOSINOPHILS RELATIVE PERCENT: 0.1 % (ref 0–6)
EOSINOPHILS RELATIVE PERCENT: 0.9 % (ref 0–6)
EOSINOPHILS RELATIVE PERCENT: 0.9 % (ref 0–6)
EOSINOPHILS RELATIVE PERCENT: 1.7 % (ref 0–6)
EPL-TEG: 0.2 % (ref 0–15)
ETHANOL: <10 MG/DL (ref 0–0.08)
FENTANYL SCREEN, URINE: NOT DETECTED
FIO2: 40 %
FIO2: 50 %
FIO2: 60 %
G-TEG: 16.4 K D/SC (ref 4.5–11)
GFR AFRICAN AMERICAN: 14
GFR AFRICAN AMERICAN: 15
GFR AFRICAN AMERICAN: 16
GFR AFRICAN AMERICAN: 17
GFR AFRICAN AMERICAN: 18
GFR AFRICAN AMERICAN: 19
GFR NON-AFRICAN AMERICAN: 14 ML/MIN/1.73
GFR NON-AFRICAN AMERICAN: 14 ML/MIN/1.73
GFR NON-AFRICAN AMERICAN: 15 ML/MIN/1.73
GFR NON-AFRICAN AMERICAN: 16 ML/MIN/1.73
GFR NON-AFRICAN AMERICAN: 17 ML/MIN/1.73
GFR NON-AFRICAN AMERICAN: 18 ML/MIN/1.73
GFR NON-AFRICAN AMERICAN: 19 ML/MIN/1.73
GLUCOSE BLD-MCNC: 116 MG/DL (ref 74–99)
GLUCOSE BLD-MCNC: 128 MG/DL (ref 74–99)
GLUCOSE BLD-MCNC: 129 MG/DL (ref 74–99)
GLUCOSE BLD-MCNC: 145 MG/DL (ref 74–99)
GLUCOSE BLD-MCNC: 165 MG/DL (ref 74–99)
GLUCOSE BLD-MCNC: 185 MG/DL (ref 74–99)
GLUCOSE BLD-MCNC: 194 MG/DL (ref 74–99)
GLUCOSE BLD-MCNC: 204 MG/DL (ref 74–99)
GLUCOSE BLD-MCNC: 216 MG/DL (ref 74–99)
GLUCOSE BLD-MCNC: 221 MG/DL (ref 74–99)
GLUCOSE BLD-MCNC: 223 MG/DL (ref 74–99)
GLUCOSE BLD-MCNC: 228 MG/DL (ref 74–99)
GLUCOSE BLD-MCNC: 244 MG/DL (ref 74–99)
GLUCOSE BLD-MCNC: 269 MG/DL (ref 74–99)
GLUCOSE BLD-MCNC: 272 MG/DL
GLUCOSE BLD-MCNC: 272 MG/DL (ref 74–99)
GLUCOSE BLD-MCNC: 277 MG/DL (ref 74–99)
GLUCOSE BLD-MCNC: 281 MG/DL (ref 74–99)
GLUCOSE BLD-MCNC: 291 MG/DL (ref 74–99)
GLUCOSE BLD-MCNC: 295 MG/DL (ref 74–99)
GLUCOSE BLD-MCNC: 305 MG/DL (ref 74–99)
GLUCOSE BLD-MCNC: 345 MG/DL (ref 74–99)
GLUCOSE BLD-MCNC: 350 MG/DL (ref 74–99)
GLUCOSE BLD-MCNC: 391 MG/DL (ref 74–99)
GLUCOSE BLD-MCNC: 452 MG/DL (ref 74–99)
GLUCOSE BLD-MCNC: 97 MG/DL (ref 74–99)
GLUCOSE BLD-MCNC: 97 MG/DL (ref 74–99)
GLUCOSE URINE: 250 MG/DL
GRAM STAIN ORDERABLE: NORMAL
HBA1C MFR BLD: 9.9 % (ref 4–5.6)
HCO3: 15.2 MMOL/L (ref 22–26)
HCO3: 16.9 MMOL/L (ref 22–26)
HCO3: 17.3 MMOL/L (ref 22–26)
HCO3: 17.3 MMOL/L (ref 22–26)
HCO3: 18.5 MMOL/L (ref 22–26)
HCO3: 19.4 MMOL/L (ref 22–26)
HCO3: 19.6 MMOL/L (ref 22–26)
HCO3: 20.5 MMOL/L (ref 22–26)
HCT VFR BLD CALC: 14.8 % (ref 34–48)
HCT VFR BLD CALC: 18.1 % (ref 34–48)
HCT VFR BLD CALC: 19.6 % (ref 34–48)
HCT VFR BLD CALC: 22.6 % (ref 34–48)
HCT VFR BLD CALC: 23.3 % (ref 34–48)
HCT VFR BLD CALC: 24.5 % (ref 34–48)
HCT VFR BLD CALC: 25 % (ref 34–48)
HCT VFR BLD CALC: 25.3 % (ref 34–48)
HCT VFR BLD CALC: 27.9 % (ref 34–48)
HCT VFR BLD CALC: 29.4 % (ref 34–48)
HEMOGLOBIN: 4.9 G/DL (ref 11.5–15.5)
HEMOGLOBIN: 6.5 G/DL (ref 11.5–15.5)
HEMOGLOBIN: 6.8 G/DL (ref 11.5–15.5)
HEMOGLOBIN: 7.5 G/DL (ref 11.5–15.5)
HEMOGLOBIN: 8 G/DL (ref 11.5–15.5)
HEMOGLOBIN: 8.1 G/DL (ref 11.5–15.5)
HEMOGLOBIN: 8.2 G/DL (ref 11.5–15.5)
HEMOGLOBIN: 8.9 G/DL (ref 11.5–15.5)
HEMOGLOBIN: 9.3 G/DL (ref 11.5–15.5)
HEMOGLOBIN: 9.6 G/DL (ref 11.5–15.5)
HHB: 1.2 % (ref 0–5)
HHB: 1.3 % (ref 0–5)
HHB: 1.3 % (ref 0–5)
HHB: 1.8 % (ref 0–5)
HHB: 2.3 % (ref 0–5)
HHB: 2.7 % (ref 0–5)
HHB: 3 % (ref 0–5)
HHB: 3.6 % (ref 0–5)
HYPOCHROMIA: ABNORMAL
K (CLOTTING TIME): 1.1 MIN (ref 1–3)
KETONES, URINE: NEGATIVE MG/DL
L. PNEUMOPHILA SEROGP 1 UR AG: NORMAL
LAB: ABNORMAL
LACTIC ACID, SEPSIS: 3.6 MMOL/L (ref 0.5–1.9)
LACTIC ACID, SEPSIS: 7.4 MMOL/L (ref 0.5–1.9)
LACTIC ACID: 2.1 MMOL/L (ref 0.5–2.2)
LACTIC ACID: 2.1 MMOL/L (ref 0.5–2.2)
LACTIC ACID: 2.4 MMOL/L (ref 0.5–2.2)
LACTIC ACID: 2.8 MMOL/L (ref 0.5–2.2)
LACTIC ACID: 3 MMOL/L (ref 0.5–2.2)
LACTIC ACID: 3.6 MMOL/L (ref 0.5–2.2)
LACTIC ACID: 3.8 MMOL/L (ref 0.5–2.2)
LEUKOCYTE ESTERASE, URINE: NEGATIVE
LY30 (FIBRINOLYSIS): 0.2 % (ref 0–8)
LYMPHOCYTES ABSOLUTE: 0.11 E9/L (ref 1.5–4)
LYMPHOCYTES ABSOLUTE: 0.34 E9/L (ref 1.5–4)
LYMPHOCYTES ABSOLUTE: 0.48 E9/L (ref 1.5–4)
LYMPHOCYTES ABSOLUTE: 0.52 E9/L (ref 1.5–4)
LYMPHOCYTES ABSOLUTE: 0.57 E9/L (ref 1.5–4)
LYMPHOCYTES ABSOLUTE: 1.13 E9/L (ref 1.5–4)
LYMPHOCYTES ABSOLUTE: 1.18 E9/L (ref 1.5–4)
LYMPHOCYTES ABSOLUTE: 1.23 E9/L (ref 1.5–4)
LYMPHOCYTES RELATIVE PERCENT: 0.9 % (ref 20–42)
LYMPHOCYTES RELATIVE PERCENT: 0.9 % (ref 20–42)
LYMPHOCYTES RELATIVE PERCENT: 1.7 % (ref 20–42)
LYMPHOCYTES RELATIVE PERCENT: 2 % (ref 20–42)
LYMPHOCYTES RELATIVE PERCENT: 3.5 % (ref 20–42)
LYMPHOCYTES RELATIVE PERCENT: 4.4 % (ref 20–42)
LYMPHOCYTES RELATIVE PERCENT: 5.2 % (ref 20–42)
LYMPHOCYTES RELATIVE PERCENT: 7 % (ref 20–42)
Lab: ABNORMAL
MA (MAX AMPLITUDE): 76.7 MM (ref 50–70)
MAGNESIUM: 1.6 MG/DL (ref 1.6–2.6)
MAGNESIUM: 1.8 MG/DL (ref 1.6–2.6)
MAGNESIUM: 1.8 MG/DL (ref 1.6–2.6)
MAGNESIUM: 1.9 MG/DL (ref 1.6–2.6)
MAGNESIUM: 2 MG/DL (ref 1.6–2.6)
MAGNESIUM: 2.1 MG/DL (ref 1.6–2.6)
MAGNESIUM: 2.2 MG/DL (ref 1.6–2.6)
MCH RBC QN AUTO: 24.7 PG (ref 26–35)
MCH RBC QN AUTO: 24.8 PG (ref 26–35)
MCH RBC QN AUTO: 24.9 PG (ref 26–35)
MCH RBC QN AUTO: 25.1 PG (ref 26–35)
MCH RBC QN AUTO: 25.5 PG (ref 26–35)
MCH RBC QN AUTO: 27.4 PG (ref 26–35)
MCH RBC QN AUTO: 28 PG (ref 26–35)
MCH RBC QN AUTO: 28.2 PG (ref 26–35)
MCH RBC QN AUTO: 28.4 PG (ref 26–35)
MCHC RBC AUTO-ENTMCNC: 31.6 % (ref 32–34.5)
MCHC RBC AUTO-ENTMCNC: 32 % (ref 32–34.5)
MCHC RBC AUTO-ENTMCNC: 32.7 % (ref 32–34.5)
MCHC RBC AUTO-ENTMCNC: 33.1 % (ref 32–34.5)
MCHC RBC AUTO-ENTMCNC: 33.2 % (ref 32–34.5)
MCHC RBC AUTO-ENTMCNC: 34.4 % (ref 32–34.5)
MCHC RBC AUTO-ENTMCNC: 35.2 % (ref 32–34.5)
MCHC RBC AUTO-ENTMCNC: 35.6 % (ref 32–34.5)
MCHC RBC AUTO-ENTMCNC: 35.9 % (ref 32–34.5)
MCV RBC AUTO: 74.8 FL (ref 80–99.9)
MCV RBC AUTO: 76.3 FL (ref 80–99.9)
MCV RBC AUTO: 77.1 FL (ref 80–99.9)
MCV RBC AUTO: 77.1 FL (ref 80–99.9)
MCV RBC AUTO: 79 FL (ref 80–99.9)
MCV RBC AUTO: 79.1 FL (ref 80–99.9)
MCV RBC AUTO: 79.5 FL (ref 80–99.9)
MCV RBC AUTO: 79.5 FL (ref 80–99.9)
MCV RBC AUTO: 79.7 FL (ref 80–99.9)
METAMYELOCYTES RELATIVE PERCENT: 0.9 % (ref 0–1)
METAMYELOCYTES RELATIVE PERCENT: 2.6 % (ref 0–1)
METAMYELOCYTES RELATIVE PERCENT: 7.8 % (ref 0–1)
METER GLUCOSE: 100 MG/DL (ref 74–99)
METER GLUCOSE: 101 MG/DL (ref 74–99)
METER GLUCOSE: 103 MG/DL (ref 74–99)
METER GLUCOSE: 105 MG/DL (ref 74–99)
METER GLUCOSE: 110 MG/DL (ref 74–99)
METER GLUCOSE: 113 MG/DL (ref 74–99)
METER GLUCOSE: 118 MG/DL (ref 74–99)
METER GLUCOSE: 122 MG/DL (ref 74–99)
METER GLUCOSE: 123 MG/DL (ref 74–99)
METER GLUCOSE: 123 MG/DL (ref 74–99)
METER GLUCOSE: 124 MG/DL (ref 74–99)
METER GLUCOSE: 125 MG/DL (ref 74–99)
METER GLUCOSE: 126 MG/DL (ref 74–99)
METER GLUCOSE: 132 MG/DL (ref 74–99)
METER GLUCOSE: 135 MG/DL (ref 74–99)
METER GLUCOSE: 138 MG/DL (ref 74–99)
METER GLUCOSE: 138 MG/DL (ref 74–99)
METER GLUCOSE: 142 MG/DL (ref 74–99)
METER GLUCOSE: 147 MG/DL (ref 74–99)
METER GLUCOSE: 153 MG/DL (ref 74–99)
METER GLUCOSE: 155 MG/DL (ref 74–99)
METER GLUCOSE: 156 MG/DL (ref 74–99)
METER GLUCOSE: 160 MG/DL (ref 74–99)
METER GLUCOSE: 161 MG/DL (ref 74–99)
METER GLUCOSE: 162 MG/DL (ref 74–99)
METER GLUCOSE: 163 MG/DL (ref 74–99)
METER GLUCOSE: 164 MG/DL (ref 74–99)
METER GLUCOSE: 172 MG/DL (ref 74–99)
METER GLUCOSE: 178 MG/DL (ref 74–99)
METER GLUCOSE: 189 MG/DL (ref 74–99)
METER GLUCOSE: 189 MG/DL (ref 74–99)
METER GLUCOSE: 194 MG/DL (ref 74–99)
METER GLUCOSE: 195 MG/DL (ref 74–99)
METER GLUCOSE: 197 MG/DL (ref 74–99)
METER GLUCOSE: 197 MG/DL (ref 74–99)
METER GLUCOSE: 198 MG/DL (ref 74–99)
METER GLUCOSE: 204 MG/DL (ref 74–99)
METER GLUCOSE: 211 MG/DL (ref 74–99)
METER GLUCOSE: 215 MG/DL (ref 74–99)
METER GLUCOSE: 224 MG/DL (ref 74–99)
METER GLUCOSE: 227 MG/DL (ref 74–99)
METER GLUCOSE: 231 MG/DL (ref 74–99)
METER GLUCOSE: 232 MG/DL (ref 74–99)
METER GLUCOSE: 236 MG/DL (ref 74–99)
METER GLUCOSE: 237 MG/DL (ref 74–99)
METER GLUCOSE: 239 MG/DL (ref 74–99)
METER GLUCOSE: 240 MG/DL (ref 74–99)
METER GLUCOSE: 244 MG/DL (ref 74–99)
METER GLUCOSE: 253 MG/DL (ref 74–99)
METER GLUCOSE: 254 MG/DL (ref 74–99)
METER GLUCOSE: 255 MG/DL (ref 74–99)
METER GLUCOSE: 261 MG/DL (ref 74–99)
METER GLUCOSE: 265 MG/DL (ref 74–99)
METER GLUCOSE: 267 MG/DL (ref 74–99)
METER GLUCOSE: 269 MG/DL (ref 74–99)
METER GLUCOSE: 270 MG/DL (ref 74–99)
METER GLUCOSE: 272 MG/DL (ref 74–99)
METER GLUCOSE: 273 MG/DL (ref 74–99)
METER GLUCOSE: 273 MG/DL (ref 74–99)
METER GLUCOSE: 278 MG/DL (ref 74–99)
METER GLUCOSE: 285 MG/DL (ref 74–99)
METER GLUCOSE: 285 MG/DL (ref 74–99)
METER GLUCOSE: 286 MG/DL (ref 74–99)
METER GLUCOSE: 288 MG/DL (ref 74–99)
METER GLUCOSE: 292 MG/DL (ref 74–99)
METER GLUCOSE: 293 MG/DL (ref 74–99)
METER GLUCOSE: 294 MG/DL (ref 74–99)
METER GLUCOSE: 295 MG/DL (ref 74–99)
METER GLUCOSE: 301 MG/DL (ref 74–99)
METER GLUCOSE: 303 MG/DL (ref 74–99)
METER GLUCOSE: 322 MG/DL (ref 74–99)
METER GLUCOSE: 336 MG/DL (ref 74–99)
METER GLUCOSE: 338 MG/DL (ref 74–99)
METER GLUCOSE: 340 MG/DL (ref 74–99)
METER GLUCOSE: 343 MG/DL (ref 74–99)
METER GLUCOSE: 414 MG/DL (ref 74–99)
METER GLUCOSE: 415 MG/DL (ref 74–99)
METER GLUCOSE: 439 MG/DL (ref 74–99)
METER GLUCOSE: 87 MG/DL (ref 74–99)
METER GLUCOSE: 94 MG/DL (ref 74–99)
METER GLUCOSE: 95 MG/DL (ref 74–99)
METER GLUCOSE: 98 MG/DL (ref 74–99)
METER GLUCOSE: 99 MG/DL (ref 74–99)
METHADONE SCREEN, URINE: NOT DETECTED
METHB: 0.1 % (ref 0–1.5)
METHB: 0.2 % (ref 0–1.5)
METHB: 0.2 % (ref 0–1.5)
METHB: 0.3 % (ref 0–1.5)
METHB: 0.4 % (ref 0–1.5)
METHB: 0.4 % (ref 0–1.5)
MODE: ABNORMAL
MODE: ABNORMAL
MODE: AC
MONOCYTES ABSOLUTE: 0.34 E9/L (ref 0.1–0.95)
MONOCYTES ABSOLUTE: 0.48 E9/L (ref 0.1–0.95)
MONOCYTES ABSOLUTE: 0.78 E9/L (ref 0.1–0.95)
MONOCYTES ABSOLUTE: 1.29 E9/L (ref 0.1–0.95)
MONOCYTES ABSOLUTE: 1.36 E9/L (ref 0.1–0.95)
MONOCYTES ABSOLUTE: 1.41 E9/L (ref 0.1–0.95)
MONOCYTES ABSOLUTE: 1.72 E9/L (ref 0.1–0.95)
MONOCYTES ABSOLUTE: 2.05 E9/L (ref 0.1–0.95)
MONOCYTES RELATIVE PERCENT: 1.7 % (ref 2–12)
MONOCYTES RELATIVE PERCENT: 12.3 % (ref 2–12)
MONOCYTES RELATIVE PERCENT: 2 % (ref 2–12)
MONOCYTES RELATIVE PERCENT: 2.6 % (ref 2–12)
MONOCYTES RELATIVE PERCENT: 5.2 % (ref 2–12)
MONOCYTES RELATIVE PERCENT: 6.1 % (ref 2–12)
MONOCYTES RELATIVE PERCENT: 6.9 % (ref 2–12)
MONOCYTES RELATIVE PERCENT: 8.7 % (ref 2–12)
MRSA CULTURE ONLY: NORMAL
MYELOCYTE PERCENT: 0.9 % (ref 0–0)
MYELOCYTE PERCENT: 1.7 % (ref 0–0)
MYELOCYTE PERCENT: 3.5 % (ref 0–0)
NEUTROPHILS ABSOLUTE: 12.3 E9/L (ref 1.8–7.3)
NEUTROPHILS ABSOLUTE: 15.29 E9/L (ref 1.8–7.3)
NEUTROPHILS ABSOLUTE: 21.56 E9/L (ref 1.8–7.3)
NEUTROPHILS ABSOLUTE: 22.94 E9/L (ref 1.8–7.3)
NEUTROPHILS ABSOLUTE: 24.7 E9/L (ref 1.8–7.3)
NEUTROPHILS ABSOLUTE: 25.38 E9/L (ref 1.8–7.3)
NEUTROPHILS ABSOLUTE: 31.46 E9/L (ref 1.8–7.3)
NEUTROPHILS ABSOLUTE: 9.83 E9/L (ref 1.8–7.3)
NEUTROPHILS RELATIVE PERCENT: 82.6 % (ref 43–80)
NEUTROPHILS RELATIVE PERCENT: 83.3 % (ref 43–80)
NEUTROPHILS RELATIVE PERCENT: 86.1 % (ref 43–80)
NEUTROPHILS RELATIVE PERCENT: 87 % (ref 43–80)
NEUTROPHILS RELATIVE PERCENT: 87.8 % (ref 43–80)
NEUTROPHILS RELATIVE PERCENT: 90.4 % (ref 43–80)
NEUTROPHILS RELATIVE PERCENT: 94.8 % (ref 43–80)
NEUTROPHILS RELATIVE PERCENT: 96 % (ref 43–80)
NITRITE, URINE: NEGATIVE
NUCLEATED RED BLOOD CELLS: 0.9 /100 WBC
NUCLEATED RED BLOOD CELLS: 0.9 /100 WBC
O2 CONTENT: 15.6 ML/DL
O2 SATURATION: 96.4 % (ref 92–98.5)
O2 SATURATION: 97 % (ref 92–98.5)
O2 SATURATION: 97.3 % (ref 92–98.5)
O2 SATURATION: 97.7 % (ref 92–98.5)
O2 SATURATION: 98.2 % (ref 92–98.5)
O2 SATURATION: 98.7 % (ref 92–98.5)
O2 SATURATION: 98.7 % (ref 92–98.5)
O2 SATURATION: 98.8 % (ref 92–98.5)
O2HB: 95.9 % (ref 94–97)
O2HB: 96.4 % (ref 94–97)
O2HB: 96.4 % (ref 94–97)
O2HB: 97 % (ref 94–97)
O2HB: 97.2 % (ref 94–97)
O2HB: 97.6 % (ref 94–97)
O2HB: 98.1 % (ref 94–97)
O2HB: 98.1 % (ref 94–97)
OPERATOR ID: 1394
OPERATOR ID: 1632
OPERATOR ID: 1721
OPERATOR ID: 1741
OPERATOR ID: 366
OPIATE SCREEN URINE: NOT DETECTED
ORGANISM: ABNORMAL
ORGANISM: ABNORMAL
OSMOLALITY URINE: 514 MOSM/KG (ref 300–900)
OSMOLALITY: 308 MOSM/KG (ref 285–310)
OVALOCYTES: ABNORMAL
OXYCODONE URINE: POSITIVE
PATIENT TEMP: 37 C
PCO2: 27.3 MMHG (ref 35–45)
PCO2: 29.9 MMHG (ref 35–45)
PCO2: 32.9 MMHG (ref 35–45)
PCO2: 33.2 MMHG (ref 35–45)
PCO2: 33.5 MMHG (ref 35–45)
PCO2: 34.4 MMHG (ref 35–45)
PCO2: 37 MMHG (ref 35–45)
PCO2: 42 MMHG (ref 35–45)
PDW BLD-RTO: 14.2 FL (ref 11.5–15)
PDW BLD-RTO: 14.5 FL (ref 11.5–15)
PDW BLD-RTO: 14.7 FL (ref 11.5–15)
PDW BLD-RTO: 14.9 FL (ref 11.5–15)
PDW BLD-RTO: 15.7 FL (ref 11.5–15)
PDW BLD-RTO: 15.9 FL (ref 11.5–15)
PDW BLD-RTO: 15.9 FL (ref 11.5–15)
PEEP/CPAP: 5 CMH2O
PERFORMED ON: ABNORMAL
PFO2: 2.25 MMHG/%
PFO2: 2.44 MMHG/%
PFO2: 2.48 MMHG/%
PFO2: 2.84 MMHG/%
PFO2: 2.88 MMHG/%
PFO2: 2.92 MMHG/%
PH BLOOD GAS: 7.26 (ref 7.35–7.45)
PH BLOOD GAS: 7.31 (ref 7.35–7.45)
PH BLOOD GAS: 7.32 (ref 7.35–7.45)
PH BLOOD GAS: 7.33 (ref 7.35–7.45)
PH BLOOD GAS: 7.33 (ref 7.35–7.45)
PH BLOOD GAS: 7.39 (ref 7.35–7.45)
PH BLOOD GAS: 7.41 (ref 7.35–7.45)
PH BLOOD GAS: 7.43 (ref 7.35–7.45)
PH UA: 5.5 (ref 5–9)
PHENCYCLIDINE SCREEN URINE: NOT DETECTED
PHOSPHORUS: 3.3 MG/DL (ref 2.5–4.5)
PHOSPHORUS: 3.3 MG/DL (ref 2.5–4.5)
PHOSPHORUS: 3.7 MG/DL (ref 2.5–4.5)
PHOSPHORUS: 3.8 MG/DL (ref 2.5–4.5)
PHOSPHORUS: 3.9 MG/DL (ref 2.5–4.5)
PHOSPHORUS: 4 MG/DL (ref 2.5–4.5)
PHOSPHORUS: 4.2 MG/DL (ref 2.5–4.5)
PHOSPHORUS: 4.2 MG/DL (ref 2.5–4.5)
PHOSPHORUS: 4.3 MG/DL (ref 2.5–4.5)
PHOSPHORUS: 4.5 MG/DL (ref 2.5–4.5)
PHOSPHORUS: 4.5 MG/DL (ref 2.5–4.5)
PHOSPHORUS: 4.6 MG/DL (ref 2.5–4.5)
PHOSPHORUS: 4.6 MG/DL (ref 2.5–4.5)
PHOSPHORUS: 4.9 MG/DL (ref 2.5–4.5)
PHOSPHORUS: 5.2 MG/DL (ref 2.5–4.5)
PHOSPHORUS: 6 MG/DL (ref 2.5–4.5)
PLATELET # BLD: 102 E9/L (ref 130–450)
PLATELET # BLD: 106 E9/L (ref 130–450)
PLATELET # BLD: 109 E9/L (ref 130–450)
PLATELET # BLD: 123 E9/L (ref 130–450)
PLATELET # BLD: 142 E9/L (ref 130–450)
PLATELET # BLD: 158 E9/L (ref 130–450)
PLATELET # BLD: 210 E9/L (ref 130–450)
PLATELET # BLD: 68 E9/L (ref 130–450)
PLATELET # BLD: 72 E9/L (ref 130–450)
PLATELET CONFIRMATION: NORMAL
PLATELET CONFIRMATION: NORMAL
PMV BLD AUTO: 10.4 FL (ref 7–12)
PMV BLD AUTO: 10.5 FL (ref 7–12)
PMV BLD AUTO: 10.6 FL (ref 7–12)
PMV BLD AUTO: 10.6 FL (ref 7–12)
PMV BLD AUTO: 11.1 FL (ref 7–12)
PMV BLD AUTO: 11.4 FL (ref 7–12)
PMV BLD AUTO: 11.5 FL (ref 7–12)
PMV BLD AUTO: 11.7 FL (ref 7–12)
PMV BLD AUTO: 11.8 FL (ref 7–12)
PO2: 113.6 MMHG (ref 75–100)
PO2: 115.1 MMHG (ref 75–100)
PO2: 146 MMHG (ref 75–100)
PO2: 148.6 MMHG (ref 75–100)
PO2: 167.2 MMHG (ref 75–100)
PO2: 191.6 MMHG (ref 75–100)
PO2: 90.1 MMHG (ref 75–100)
PO2: 97.7 MMHG (ref 75–100)
POC CHLORIDE: 108 MMOL/L (ref 100–108)
POC CREATININE: 3.3 MG/DL (ref 0.5–1)
POC POTASSIUM: 3.8 MMOL/L (ref 3.5–5)
POC SODIUM: 140 MMOL/L (ref 132–146)
POIKILOCYTES: ABNORMAL
POLYCHROMASIA: ABNORMAL
POTASSIUM REFLEX MAGNESIUM: 4.5 MMOL/L (ref 3.5–5)
POTASSIUM REFLEX MAGNESIUM: 4.7 MMOL/L (ref 3.5–5)
POTASSIUM REFLEX MAGNESIUM: 4.9 MMOL/L (ref 3.5–5)
POTASSIUM REFLEX MAGNESIUM: 4.9 MMOL/L (ref 3.5–5)
POTASSIUM REFLEX MAGNESIUM: 5 MMOL/L (ref 3.5–5)
POTASSIUM REFLEX MAGNESIUM: 5.9 MMOL/L (ref 3.5–5)
POTASSIUM SERPL-SCNC: 3.9 MMOL/L (ref 3.5–5)
POTASSIUM SERPL-SCNC: 4 MMOL/L (ref 3.5–5)
POTASSIUM SERPL-SCNC: 4.3 MMOL/L (ref 3.5–5)
POTASSIUM SERPL-SCNC: 4.4 MMOL/L (ref 3.5–5)
POTASSIUM SERPL-SCNC: 4.5 MMOL/L (ref 3.5–5)
POTASSIUM SERPL-SCNC: 4.5 MMOL/L (ref 3.5–5)
POTASSIUM SERPL-SCNC: 4.55 MMOL/L (ref 3.5–5)
POTASSIUM SERPL-SCNC: 4.7 MMOL/L (ref 3.5–5)
POTASSIUM SERPL-SCNC: 4.8 MMOL/L (ref 3.5–5)
POTASSIUM SERPL-SCNC: 4.9 MMOL/L (ref 3.5–5)
POTASSIUM SERPL-SCNC: 5 MMOL/L (ref 3.5–5)
POTASSIUM SERPL-SCNC: 5.2 MMOL/L (ref 3.5–5)
POTASSIUM SERPL-SCNC: 5.4 MMOL/L (ref 3.5–5)
POTASSIUM, UR: 70.1 MMOL/L
PRO-BNP: 7528 PG/ML (ref 0–450)
PROCALCITONIN: 82.64 NG/ML (ref 0–0.08)
PROTEIN UA: 100 MG/DL
R (REACTION TIME): 4.3 MIN (ref 5–10)
RBC # BLD: 1.92 E12/L (ref 3.5–5.5)
RBC # BLD: 2.29 E12/L (ref 3.5–5.5)
RBC # BLD: 2.93 E12/L (ref 3.5–5.5)
RBC # BLD: 3.02 E12/L (ref 3.5–5.5)
RBC # BLD: 3.16 E12/L (ref 3.5–5.5)
RBC # BLD: 3.21 E12/L (ref 3.5–5.5)
RBC # BLD: 3.28 E12/L (ref 3.5–5.5)
RBC # BLD: 3.5 E12/L (ref 3.5–5.5)
RBC # BLD: 3.7 E12/L (ref 3.5–5.5)
RBC UA: ABNORMAL /HPF (ref 0–2)
REASON FOR REJECTION: NORMAL
REJECTED TEST: NORMAL
RI(T): 1.06
RI(T): 1.12
RI(T): 1.15
RI(T): 1.38
RI(T): 1.52
RI(T): 1.52
RR MECHANICAL: 14 B/MIN
SALICYLATE, SERUM: <0.3 MG/DL (ref 0–30)
SARS-COV-2, NAAT: NOT DETECTED
SCHISTOCYTES: ABNORMAL
SCHISTOCYTES: ABNORMAL
SODIUM BLD-SCNC: 116 MMOL/L (ref 132–146)
SODIUM BLD-SCNC: 118 MMOL/L (ref 132–146)
SODIUM BLD-SCNC: 118 MMOL/L (ref 132–146)
SODIUM BLD-SCNC: 121 MMOL/L (ref 132–146)
SODIUM BLD-SCNC: 121 MMOL/L (ref 132–146)
SODIUM BLD-SCNC: 122 MMOL/L (ref 132–146)
SODIUM BLD-SCNC: 122 MMOL/L (ref 132–146)
SODIUM BLD-SCNC: 123 MMOL/L (ref 132–146)
SODIUM BLD-SCNC: 124 MMOL/L (ref 132–146)
SODIUM BLD-SCNC: 125 MMOL/L (ref 132–146)
SODIUM BLD-SCNC: 126 MMOL/L (ref 132–146)
SODIUM BLD-SCNC: 126 MMOL/L (ref 132–146)
SODIUM BLD-SCNC: 128 MMOL/L (ref 132–146)
SODIUM BLD-SCNC: 130 MMOL/L (ref 132–146)
SODIUM BLD-SCNC: 131 MMOL/L (ref 132–146)
SODIUM BLD-SCNC: 132 MMOL/L (ref 132–146)
SODIUM BLD-SCNC: 134 MMOL/L (ref 132–146)
SODIUM URINE: 40 MMOL/L
SODIUM URINE: 43 MMOL/L
SOURCE, BLOOD GAS: ABNORMAL
SPECIFIC GRAVITY UA: >=1.03 (ref 1–1.03)
SPHEROCYTES: ABNORMAL
STREP PNEUMONIAE ANTIGEN, URINE: NORMAL
THB: 10 G/DL (ref 11.5–16.5)
THB: 10.8 G/DL (ref 11.5–16.5)
THB: 11 G/DL (ref 11.5–16.5)
THB: 11.2 G/DL (ref 11.5–16.5)
THB: 6.9 G/DL (ref 11.5–16.5)
THB: 8.4 G/DL (ref 11.5–16.5)
THB: 8.4 G/DL (ref 11.5–16.5)
THB: 9.4 G/DL (ref 11.5–16.5)
TIME ANALYZED: 1814
TIME ANALYZED: 1928
TIME ANALYZED: 248
TIME ANALYZED: 418
TIME ANALYZED: 516
TIME ANALYZED: 611
TIME ANALYZED: 617
TIME ANALYZED: 910
TOTAL PROTEIN: 3.6 G/DL (ref 6.4–8.3)
TOTAL PROTEIN: 3.7 G/DL (ref 6.4–8.3)
TOTAL PROTEIN: 3.9 G/DL (ref 6.4–8.3)
TOTAL PROTEIN: 3.9 G/DL (ref 6.4–8.3)
TOTAL PROTEIN: 4.2 G/DL (ref 6.4–8.3)
TOTAL PROTEIN: 4.2 G/DL (ref 6.4–8.3)
TOTAL PROTEIN: 4.7 G/DL (ref 6.4–8.3)
TOTAL PROTEIN: 4.8 G/DL (ref 6.4–8.3)
TOTAL PROTEIN: 5 G/DL (ref 6.4–8.3)
TOTAL PROTEIN: 6.4 G/DL (ref 6.4–8.3)
TOTAL PROTEIN: 6.4 G/DL (ref 6.4–8.3)
TRICYCLIC ANTIDEPRESSANTS SCREEN SERUM: NEGATIVE NG/ML
TROPONIN, HIGH SENSITIVITY: 47 NG/L (ref 0–9)
TROPONIN, HIGH SENSITIVITY: 54 NG/L (ref 0–9)
URINE CULTURE, ROUTINE: ABNORMAL
UROBILINOGEN, URINE: 1 E.U./DL
VACUOLATED NEUTROPHILS: ABNORMAL
VACUOLATED NEUTROPHILS: ABNORMAL
VANCOMYCIN RANDOM: 16.1 MCG/ML (ref 5–40)
VANCOMYCIN RANDOM: 21.3 MCG/ML (ref 5–40)
VANCOMYCIN RANDOM: 21.6 MCG/ML (ref 5–40)
VANCOMYCIN RANDOM: 29.5 MCG/ML (ref 5–40)
VT MECHANICAL: 450 ML
WBC # BLD: 10.5 E9/L (ref 4.5–11.5)
WBC # BLD: 11.3 E9/L (ref 4.5–11.5)
WBC # BLD: 14.3 E9/L (ref 4.5–11.5)
WBC # BLD: 16.8 E9/L (ref 4.5–11.5)
WBC # BLD: 23.9 E9/L (ref 4.5–11.5)
WBC # BLD: 24.5 E9/L (ref 4.5–11.5)
WBC # BLD: 26 E9/L (ref 4.5–11.5)
WBC # BLD: 28.2 E9/L (ref 4.5–11.5)
WBC # BLD: 34.2 E9/L (ref 4.5–11.5)
WBC UA: ABNORMAL /HPF (ref 0–5)

## 2022-01-01 PROCEDURE — 2580000003 HC RX 258: Performed by: SURGERY

## 2022-01-01 PROCEDURE — 87205 SMEAR GRAM STAIN: CPT

## 2022-01-01 PROCEDURE — 6370000000 HC RX 637 (ALT 250 FOR IP): Performed by: STUDENT IN AN ORGANIZED HEALTH CARE EDUCATION/TRAINING PROGRAM

## 2022-01-01 PROCEDURE — G8400 PT W/DXA NO RESULTS DOC: HCPCS | Performed by: INTERNAL MEDICINE

## 2022-01-01 PROCEDURE — 7100000000 HC PACU RECOVERY - FIRST 15 MIN

## 2022-01-01 PROCEDURE — 36556 INSERT NON-TUNNEL CV CATH: CPT

## 2022-01-01 PROCEDURE — 36415 COLL VENOUS BLD VENIPUNCTURE: CPT

## 2022-01-01 PROCEDURE — A4216 STERILE WATER/SALINE, 10 ML: HCPCS | Performed by: SURGERY

## 2022-01-01 PROCEDURE — 3017F COLORECTAL CA SCREEN DOC REV: CPT | Performed by: INTERNAL MEDICINE

## 2022-01-01 PROCEDURE — 82805 BLOOD GASES W/O2 SATURATION: CPT

## 2022-01-01 PROCEDURE — 85025 COMPLETE CBC W/AUTO DIFF WBC: CPT

## 2022-01-01 PROCEDURE — 6360000002 HC RX W HCPCS: Performed by: STUDENT IN AN ORGANIZED HEALTH CARE EDUCATION/TRAINING PROGRAM

## 2022-01-01 PROCEDURE — 84300 ASSAY OF URINE SODIUM: CPT

## 2022-01-01 PROCEDURE — 2580000003 HC RX 258: Performed by: STUDENT IN AN ORGANIZED HEALTH CARE EDUCATION/TRAINING PROGRAM

## 2022-01-01 PROCEDURE — 2500000003 HC RX 250 WO HCPCS: Performed by: SURGERY

## 2022-01-01 PROCEDURE — 6360000002 HC RX W HCPCS

## 2022-01-01 PROCEDURE — 6370000000 HC RX 637 (ALT 250 FOR IP): Performed by: SURGERY

## 2022-01-01 PROCEDURE — 83935 ASSAY OF URINE OSMOLALITY: CPT

## 2022-01-01 PROCEDURE — 4040F PNEUMOC VAC/ADMIN/RCVD: CPT | Performed by: INTERNAL MEDICINE

## 2022-01-01 PROCEDURE — 71045 X-RAY EXAM CHEST 1 VIEW: CPT

## 2022-01-01 PROCEDURE — 3046F HEMOGLOBIN A1C LEVEL >9.0%: CPT | Performed by: INTERNAL MEDICINE

## 2022-01-01 PROCEDURE — C9113 INJ PANTOPRAZOLE SODIUM, VIA: HCPCS | Performed by: SURGERY

## 2022-01-01 PROCEDURE — 80053 COMPREHEN METABOLIC PANEL: CPT

## 2022-01-01 PROCEDURE — 3600000004 HC SURGERY LEVEL 4 BASE: Performed by: SURGERY

## 2022-01-01 PROCEDURE — 94002 VENT MGMT INPAT INIT DAY: CPT

## 2022-01-01 PROCEDURE — 99231 SBSQ HOSP IP/OBS SF/LOW 25: CPT | Performed by: NURSE PRACTITIONER

## 2022-01-01 PROCEDURE — 84100 ASSAY OF PHOSPHORUS: CPT

## 2022-01-01 PROCEDURE — 2500000003 HC RX 250 WO HCPCS

## 2022-01-01 PROCEDURE — 6360000002 HC RX W HCPCS: Performed by: SURGERY

## 2022-01-01 PROCEDURE — 1036F TOBACCO NON-USER: CPT | Performed by: INTERNAL MEDICINE

## 2022-01-01 PROCEDURE — 93000 ELECTROCARDIOGRAM COMPLETE: CPT | Performed by: INTERNAL MEDICINE

## 2022-01-01 PROCEDURE — 83605 ASSAY OF LACTIC ACID: CPT

## 2022-01-01 PROCEDURE — 6370000000 HC RX 637 (ALT 250 FOR IP): Performed by: ANESTHESIOLOGY

## 2022-01-01 PROCEDURE — 96376 TX/PRO/DX INJ SAME DRUG ADON: CPT

## 2022-01-01 PROCEDURE — 3430000000 HC RX DIAGNOSTIC RADIOPHARMACEUTICAL: Performed by: INTERNAL MEDICINE

## 2022-01-01 PROCEDURE — 2022F DILAT RTA XM EVC RTNOPTHY: CPT | Performed by: INTERNAL MEDICINE

## 2022-01-01 PROCEDURE — 82962 GLUCOSE BLOOD TEST: CPT

## 2022-01-01 PROCEDURE — 87081 CULTURE SCREEN ONLY: CPT

## 2022-01-01 PROCEDURE — 84484 ASSAY OF TROPONIN QUANT: CPT

## 2022-01-01 PROCEDURE — P9016 RBC LEUKOCYTES REDUCED: HCPCS

## 2022-01-01 PROCEDURE — 80048 BASIC METABOLIC PNL TOTAL CA: CPT

## 2022-01-01 PROCEDURE — 80307 DRUG TEST PRSMV CHEM ANLYZR: CPT

## 2022-01-01 PROCEDURE — 78452 HT MUSCLE IMAGE SPECT MULT: CPT

## 2022-01-01 PROCEDURE — 70450 CT HEAD/BRAIN W/O DYE: CPT

## 2022-01-01 PROCEDURE — G8417 CALC BMI ABV UP PARAM F/U: HCPCS | Performed by: INTERNAL MEDICINE

## 2022-01-01 PROCEDURE — 96375 TX/PRO/DX INJ NEW DRUG ADDON: CPT

## 2022-01-01 PROCEDURE — 87040 BLOOD CULTURE FOR BACTERIA: CPT

## 2022-01-01 PROCEDURE — 85384 FIBRINOGEN ACTIVITY: CPT

## 2022-01-01 PROCEDURE — 93017 CV STRESS TEST TRACING ONLY: CPT

## 2022-01-01 PROCEDURE — 87075 CULTR BACTERIA EXCEPT BLOOD: CPT

## 2022-01-01 PROCEDURE — 99291 CRITICAL CARE FIRST HOUR: CPT | Performed by: SURGERY

## 2022-01-01 PROCEDURE — 87449 NOS EACH ORGANISM AG IA: CPT

## 2022-01-01 PROCEDURE — 96366 THER/PROPH/DIAG IV INF ADDON: CPT

## 2022-01-01 PROCEDURE — 1123F ACP DISCUSS/DSCN MKR DOCD: CPT | Performed by: INTERNAL MEDICINE

## 2022-01-01 PROCEDURE — 2000000000 HC ICU R&B

## 2022-01-01 PROCEDURE — 82330 ASSAY OF CALCIUM: CPT

## 2022-01-01 PROCEDURE — 94003 VENT MGMT INPAT SUBQ DAY: CPT

## 2022-01-01 PROCEDURE — 80202 ASSAY OF VANCOMYCIN: CPT

## 2022-01-01 PROCEDURE — 2580000003 HC RX 258

## 2022-01-01 PROCEDURE — 87635 SARS-COV-2 COVID-19 AMP PRB: CPT

## 2022-01-01 PROCEDURE — 7100000001 HC PACU RECOVERY - ADDTL 15 MIN

## 2022-01-01 PROCEDURE — 82436 ASSAY OF URINE CHLORIDE: CPT

## 2022-01-01 PROCEDURE — 82010 KETONE BODYS QUAN: CPT

## 2022-01-01 PROCEDURE — 2500000003 HC RX 250 WO HCPCS: Performed by: STUDENT IN AN ORGANIZED HEALTH CARE EDUCATION/TRAINING PROGRAM

## 2022-01-01 PROCEDURE — 36600 WITHDRAWAL OF ARTERIAL BLOOD: CPT

## 2022-01-01 PROCEDURE — 87070 CULTURE OTHR SPECIMN AEROBIC: CPT

## 2022-01-01 PROCEDURE — 85347 COAGULATION TIME ACTIVATED: CPT

## 2022-01-01 PROCEDURE — 3700000001 HC ADD 15 MINUTES (ANESTHESIA): Performed by: SURGERY

## 2022-01-01 PROCEDURE — 87088 URINE BACTERIA CULTURE: CPT

## 2022-01-01 PROCEDURE — 3600000003 HC SURGERY LEVEL 3 BASE: Performed by: SURGERY

## 2022-01-01 PROCEDURE — 37799 UNLISTED PX VASCULAR SURGERY: CPT

## 2022-01-01 PROCEDURE — 84132 ASSAY OF SERUM POTASSIUM: CPT

## 2022-01-01 PROCEDURE — 3600000014 HC SURGERY LEVEL 4 ADDTL 15MIN: Performed by: SURGERY

## 2022-01-01 PROCEDURE — 36620 INSERTION CATHETER ARTERY: CPT

## 2022-01-01 PROCEDURE — 82077 ASSAY SPEC XCP UR&BREATH IA: CPT

## 2022-01-01 PROCEDURE — 84145 PROCALCITONIN (PCT): CPT

## 2022-01-01 PROCEDURE — 85018 HEMOGLOBIN: CPT

## 2022-01-01 PROCEDURE — 83735 ASSAY OF MAGNESIUM: CPT

## 2022-01-01 PROCEDURE — 83880 ASSAY OF NATRIURETIC PEPTIDE: CPT

## 2022-01-01 PROCEDURE — 2709999900 HC NON-CHARGEABLE SUPPLY: Performed by: SURGERY

## 2022-01-01 PROCEDURE — G8427 DOCREV CUR MEDS BY ELIG CLIN: HCPCS | Performed by: INTERNAL MEDICINE

## 2022-01-01 PROCEDURE — 82570 ASSAY OF URINE CREATININE: CPT

## 2022-01-01 PROCEDURE — 83930 ASSAY OF BLOOD OSMOLALITY: CPT

## 2022-01-01 PROCEDURE — 82435 ASSAY OF BLOOD CHLORIDE: CPT

## 2022-01-01 PROCEDURE — 80179 DRUG ASSAY SALICYLATE: CPT

## 2022-01-01 PROCEDURE — 85014 HEMATOCRIT: CPT

## 2022-01-01 PROCEDURE — 87186 SC STD MICRODIL/AGAR DIL: CPT

## 2022-01-01 PROCEDURE — 86923 COMPATIBILITY TEST ELECTRIC: CPT

## 2022-01-01 PROCEDURE — 11006 DBRDMT SKIN XTRNL GENT PER: CPT | Performed by: SURGERY

## 2022-01-01 PROCEDURE — A9500 TC99M SESTAMIBI: HCPCS | Performed by: INTERNAL MEDICINE

## 2022-01-01 PROCEDURE — 99285 EMERGENCY DEPT VISIT HI MDM: CPT

## 2022-01-01 PROCEDURE — 3600000013 HC SURGERY LEVEL 3 ADDTL 15MIN: Performed by: SURGERY

## 2022-01-01 PROCEDURE — 2580000003 HC RX 258: Performed by: INTERNAL MEDICINE

## 2022-01-01 PROCEDURE — 1090F PRES/ABSN URINE INCON ASSESS: CPT | Performed by: INTERNAL MEDICINE

## 2022-01-01 PROCEDURE — 82947 ASSAY GLUCOSE BLOOD QUANT: CPT

## 2022-01-01 PROCEDURE — 93005 ELECTROCARDIOGRAM TRACING: CPT | Performed by: STUDENT IN AN ORGANIZED HEALTH CARE EDUCATION/TRAINING PROGRAM

## 2022-01-01 PROCEDURE — 87077 CULTURE AEROBIC IDENTIFY: CPT

## 2022-01-01 PROCEDURE — 86900 BLOOD TYPING SEROLOGIC ABO: CPT

## 2022-01-01 PROCEDURE — 99214 OFFICE O/P EST MOD 30 MIN: CPT | Performed by: INTERNAL MEDICINE

## 2022-01-01 PROCEDURE — 82533 TOTAL CORTISOL: CPT

## 2022-01-01 PROCEDURE — 80143 DRUG ASSAY ACETAMINOPHEN: CPT

## 2022-01-01 PROCEDURE — 36430 TRANSFUSION BLD/BLD COMPNT: CPT

## 2022-01-01 PROCEDURE — 11004 DBRDMT SKIN XTRNL GENT&PER: CPT | Performed by: SURGERY

## 2022-01-01 PROCEDURE — 82565 ASSAY OF CREATININE: CPT

## 2022-01-01 PROCEDURE — 84133 ASSAY OF URINE POTASSIUM: CPT

## 2022-01-01 PROCEDURE — 11005 DBRDMT SKIN ABDOMINAL WALL: CPT | Performed by: SURGERY

## 2022-01-01 PROCEDURE — 86901 BLOOD TYPING SEROLOGIC RH(D): CPT

## 2022-01-01 PROCEDURE — 88304 TISSUE EXAM BY PATHOLOGIST: CPT

## 2022-01-01 PROCEDURE — 3700000000 HC ANESTHESIA ATTENDED CARE: Performed by: SURGERY

## 2022-01-01 PROCEDURE — 86850 RBC ANTIBODY SCREEN: CPT

## 2022-01-01 PROCEDURE — 74176 CT ABD & PELVIS W/O CONTRAST: CPT

## 2022-01-01 PROCEDURE — 51702 INSERT TEMP BLADDER CATH: CPT

## 2022-01-01 PROCEDURE — 83036 HEMOGLOBIN GLYCOSYLATED A1C: CPT

## 2022-01-01 PROCEDURE — 06HM33Z INSERTION OF INFUSION DEVICE INTO RIGHT FEMORAL VEIN, PERCUTANEOUS APPROACH: ICD-10-PCS | Performed by: STUDENT IN AN ORGANIZED HEALTH CARE EDUCATION/TRAINING PROGRAM

## 2022-01-01 PROCEDURE — 96367 TX/PROPH/DG ADDL SEQ IV INF: CPT

## 2022-01-01 PROCEDURE — G8484 FLU IMMUNIZE NO ADMIN: HCPCS | Performed by: INTERNAL MEDICINE

## 2022-01-01 PROCEDURE — P9045 ALBUMIN (HUMAN), 5%, 250 ML: HCPCS | Performed by: SURGERY

## 2022-01-01 PROCEDURE — 0KBM0ZZ EXCISION OF PERINEUM MUSCLE, OPEN APPROACH: ICD-10-PCS | Performed by: SURGERY

## 2022-01-01 PROCEDURE — 85576 BLOOD PLATELET AGGREGATION: CPT

## 2022-01-01 PROCEDURE — 0QB20ZZ EXCISION OF RIGHT PELVIC BONE, OPEN APPROACH: ICD-10-PCS | Performed by: SURGERY

## 2022-01-01 PROCEDURE — 85027 COMPLETE CBC AUTOMATED: CPT

## 2022-01-01 PROCEDURE — 81001 URINALYSIS AUTO W/SCOPE: CPT

## 2022-01-01 PROCEDURE — 96365 THER/PROPH/DIAG IV INF INIT: CPT

## 2022-01-01 PROCEDURE — 6360000002 HC RX W HCPCS: Performed by: INTERNAL MEDICINE

## 2022-01-01 PROCEDURE — 05H533Z INSERTION OF INFUSION DEVICE INTO RIGHT SUBCLAVIAN VEIN, PERCUTANEOUS APPROACH: ICD-10-PCS | Performed by: STUDENT IN AN ORGANIZED HEALTH CARE EDUCATION/TRAINING PROGRAM

## 2022-01-01 PROCEDURE — 84295 ASSAY OF SERUM SODIUM: CPT

## 2022-01-01 RX ORDER — SODIUM CHLORIDE, SODIUM GLUCONATE, SODIUM ACETATE, POTASSIUM CHLORIDE AND MAGNESIUM CHLORIDE 526; 502; 368; 37; 30 MG/100ML; MG/100ML; MG/100ML; MG/100ML; MG/100ML
INJECTION, SOLUTION INTRAVENOUS CONTINUOUS PRN
Status: DISCONTINUED | OUTPATIENT
Start: 2022-01-01 | End: 2022-01-01 | Stop reason: SDUPTHER

## 2022-01-01 RX ORDER — SPIRONOLACTONE 25 MG/1
12.5 TABLET ORAL DAILY
Qty: 30 TABLET | Refills: 5 | Status: SHIPPED | OUTPATIENT
Start: 2022-01-01

## 2022-01-01 RX ORDER — DEXTROSE AND SODIUM CHLORIDE 5; .45 G/100ML; G/100ML
INJECTION, SOLUTION INTRAVENOUS CONTINUOUS PRN
Status: DISCONTINUED | OUTPATIENT
Start: 2022-01-01 | End: 2022-01-01

## 2022-01-01 RX ORDER — MAGNESIUM SULFATE 1 G/100ML
1000 INJECTION INTRAVENOUS PRN
Status: DISCONTINUED | OUTPATIENT
Start: 2022-01-01 | End: 2022-01-01

## 2022-01-01 RX ORDER — ALBUMIN, HUMAN INJ 5% 5 %
SOLUTION INTRAVENOUS
Status: DISPENSED
Start: 2022-01-01 | End: 2022-01-01

## 2022-01-01 RX ORDER — FENTANYL CITRATE 50 UG/ML
12.5 INJECTION, SOLUTION INTRAMUSCULAR; INTRAVENOUS EVERY 10 MIN PRN
Status: DISCONTINUED | OUTPATIENT
Start: 2022-01-01 | End: 2022-01-01 | Stop reason: HOSPADM

## 2022-01-01 RX ORDER — POTASSIUM CHLORIDE 7.45 MG/ML
10 INJECTION INTRAVENOUS PRN
Status: DISCONTINUED | OUTPATIENT
Start: 2022-01-01 | End: 2022-01-01

## 2022-01-01 RX ORDER — 3% SODIUM CHLORIDE 3 G/100ML
30 INJECTION, SOLUTION INTRAVENOUS CONTINUOUS
Status: DISCONTINUED | OUTPATIENT
Start: 2022-01-01 | End: 2022-01-01

## 2022-01-01 RX ORDER — MAGNESIUM SULFATE IN WATER 40 MG/ML
2000 INJECTION, SOLUTION INTRAVENOUS ONCE
Status: COMPLETED | OUTPATIENT
Start: 2022-01-01 | End: 2022-01-01

## 2022-01-01 RX ORDER — HEPARIN SODIUM 10000 [USP'U]/ML
5000 INJECTION, SOLUTION INTRAVENOUS; SUBCUTANEOUS EVERY 8 HOURS SCHEDULED
Status: DISCONTINUED | OUTPATIENT
Start: 2022-01-01 | End: 2022-01-01

## 2022-01-01 RX ORDER — FENTANYL CITRATE 50 UG/ML
INJECTION, SOLUTION INTRAMUSCULAR; INTRAVENOUS PRN
Status: DISCONTINUED | OUTPATIENT
Start: 2022-01-01 | End: 2022-01-01 | Stop reason: SDUPTHER

## 2022-01-01 RX ORDER — DEXMEDETOMIDINE HYDROCHLORIDE 4 UG/ML
.2-1.4 INJECTION, SOLUTION INTRAVENOUS CONTINUOUS
Status: DISCONTINUED | OUTPATIENT
Start: 2022-01-01 | End: 2022-01-01 | Stop reason: CLARIF

## 2022-01-01 RX ORDER — ROSUVASTATIN CALCIUM 20 MG/1
20 TABLET, COATED ORAL DAILY
Status: DISCONTINUED | OUTPATIENT
Start: 2022-01-01 | End: 2022-01-01

## 2022-01-01 RX ORDER — SODIUM CHLORIDE, SODIUM LACTATE, POTASSIUM CHLORIDE, CALCIUM CHLORIDE 600; 310; 30; 20 MG/100ML; MG/100ML; MG/100ML; MG/100ML
INJECTION, SOLUTION INTRAVENOUS CONTINUOUS
Status: DISCONTINUED | OUTPATIENT
Start: 2022-01-01 | End: 2022-01-01

## 2022-01-01 RX ORDER — SODIUM CHLORIDE 0.9 % (FLUSH) 0.9 %
5-40 SYRINGE (ML) INJECTION PRN
Status: DISCONTINUED | OUTPATIENT
Start: 2022-01-01 | End: 2022-01-01

## 2022-01-01 RX ORDER — SUCCINYLCHOLINE/SOD CL,ISO/PF 200MG/10ML
SYRINGE (ML) INTRAVENOUS PRN
Status: DISCONTINUED | OUTPATIENT
Start: 2022-01-01 | End: 2022-01-01 | Stop reason: SDUPTHER

## 2022-01-01 RX ORDER — CLINDAMYCIN PHOSPHATE 900 MG/50ML
900 INJECTION INTRAVENOUS ONCE
Status: COMPLETED | OUTPATIENT
Start: 2022-01-01 | End: 2022-01-01

## 2022-01-01 RX ORDER — SODIUM HYPOCHLORITE 5 MG/ML
SOLUTION TOPICAL PRN
Status: DISCONTINUED | OUTPATIENT
Start: 2022-01-01 | End: 2022-01-01 | Stop reason: ALTCHOICE

## 2022-01-01 RX ORDER — EPINEPHRINE 1 MG/ML
INJECTION, SOLUTION, CONCENTRATE INTRAVENOUS
Status: COMPLETED
Start: 2022-01-01 | End: 2022-01-01

## 2022-01-01 RX ORDER — ASPIRIN 81 MG/1
81 TABLET ORAL DAILY
Qty: 90 TABLET | Refills: 3 | Status: SHIPPED | OUTPATIENT
Start: 2022-01-01

## 2022-01-01 RX ORDER — SODIUM CHLORIDE 0.9 % (FLUSH) 0.9 %
10 SYRINGE (ML) INJECTION PRN
Status: DISCONTINUED | OUTPATIENT
Start: 2022-01-01 | End: 2022-01-01 | Stop reason: HOSPADM

## 2022-01-01 RX ORDER — ONDANSETRON 2 MG/ML
4 INJECTION INTRAMUSCULAR; INTRAVENOUS EVERY 6 HOURS PRN
Status: DISCONTINUED | OUTPATIENT
Start: 2022-01-01 | End: 2022-01-01

## 2022-01-01 RX ORDER — 0.9 % SODIUM CHLORIDE 0.9 %
15 INTRAVENOUS SOLUTION INTRAVENOUS ONCE
Status: DISCONTINUED | OUTPATIENT
Start: 2022-01-01 | End: 2022-01-01

## 2022-01-01 RX ORDER — ONDANSETRON 4 MG/1
4 TABLET, ORALLY DISINTEGRATING ORAL EVERY 8 HOURS PRN
Status: DISCONTINUED | OUTPATIENT
Start: 2022-01-01 | End: 2022-01-01

## 2022-01-01 RX ORDER — DEXTROSE, SODIUM CHLORIDE, AND POTASSIUM CHLORIDE 5; .45; .15 G/100ML; G/100ML; G/100ML
INJECTION INTRAVENOUS CONTINUOUS PRN
Status: DISCONTINUED | OUTPATIENT
Start: 2022-01-01 | End: 2022-01-01

## 2022-01-01 RX ORDER — GLYCOPYRROLATE 0.2 MG/ML
0.2 INJECTION INTRAMUSCULAR; INTRAVENOUS EVERY 4 HOURS PRN
Status: DISCONTINUED | OUTPATIENT
Start: 2022-01-01 | End: 2022-01-01 | Stop reason: HOSPADM

## 2022-01-01 RX ORDER — 0.9 % SODIUM CHLORIDE 0.9 %
1000 INTRAVENOUS SOLUTION INTRAVENOUS ONCE
Status: COMPLETED | OUTPATIENT
Start: 2022-01-01 | End: 2022-01-01

## 2022-01-01 RX ORDER — ACETAMINOPHEN 160 MG/5ML
650 SOLUTION ORAL EVERY 6 HOURS PRN
Status: DISCONTINUED | OUTPATIENT
Start: 2022-01-01 | End: 2022-01-01

## 2022-01-01 RX ORDER — SODIUM CHLORIDE 0.9 % (FLUSH) 0.9 %
5-40 SYRINGE (ML) INJECTION EVERY 12 HOURS SCHEDULED
Status: DISCONTINUED | OUTPATIENT
Start: 2022-01-01 | End: 2022-01-01

## 2022-01-01 RX ORDER — SODIUM CHLORIDE 9 MG/ML
INJECTION, SOLUTION INTRAVENOUS PRN
Status: DISCONTINUED | OUTPATIENT
Start: 2022-01-01 | End: 2022-01-01

## 2022-01-01 RX ORDER — VASOPRESSIN 20 U/ML
INJECTION PARENTERAL PRN
Status: DISCONTINUED | OUTPATIENT
Start: 2022-01-01 | End: 2022-01-01 | Stop reason: SDUPTHER

## 2022-01-01 RX ORDER — ROSUVASTATIN CALCIUM 10 MG/1
10 TABLET, COATED ORAL DAILY
Status: DISCONTINUED | OUTPATIENT
Start: 2022-01-01 | End: 2022-01-01

## 2022-01-01 RX ORDER — FENTANYL CITRATE 50 UG/ML
25 INJECTION, SOLUTION INTRAMUSCULAR; INTRAVENOUS EVERY 10 MIN PRN
Status: DISCONTINUED | OUTPATIENT
Start: 2022-01-01 | End: 2022-01-01 | Stop reason: HOSPADM

## 2022-01-01 RX ORDER — DEXTROSE MONOHYDRATE 100 MG/ML
INJECTION, SOLUTION INTRAVENOUS CONTINUOUS PRN
Status: DISCONTINUED | OUTPATIENT
Start: 2022-01-01 | End: 2022-01-01

## 2022-01-01 RX ORDER — ROCURONIUM BROMIDE 10 MG/ML
INJECTION, SOLUTION INTRAVENOUS PRN
Status: DISCONTINUED | OUTPATIENT
Start: 2022-01-01 | End: 2022-01-01 | Stop reason: SDUPTHER

## 2022-01-01 RX ORDER — CLINDAMYCIN PHOSPHATE 900 MG/50ML
900 INJECTION INTRAVENOUS EVERY 8 HOURS
Status: DISCONTINUED | OUTPATIENT
Start: 2022-01-01 | End: 2022-01-01

## 2022-01-01 RX ORDER — POLYETHYLENE GLYCOL 3350 17 G/17G
17 POWDER, FOR SOLUTION ORAL DAILY PRN
Status: DISCONTINUED | OUTPATIENT
Start: 2022-01-01 | End: 2022-01-01

## 2022-01-01 RX ORDER — SODIUM CHLORIDE, SODIUM LACTATE, POTASSIUM CHLORIDE, AND CALCIUM CHLORIDE .6; .31; .03; .02 G/100ML; G/100ML; G/100ML; G/100ML
1000 INJECTION, SOLUTION INTRAVENOUS ONCE
Status: COMPLETED | OUTPATIENT
Start: 2022-01-01 | End: 2022-01-01

## 2022-01-01 RX ORDER — POLYVINYL ALCOHOL 14 MG/ML
1 SOLUTION/ DROPS OPHTHALMIC EVERY 4 HOURS
Status: DISCONTINUED | OUTPATIENT
Start: 2022-01-01 | End: 2022-01-01

## 2022-01-01 RX ORDER — ACETAMINOPHEN 160 MG/5ML
650 SOLUTION ORAL EVERY 6 HOURS
Status: DISCONTINUED | OUTPATIENT
Start: 2022-01-01 | End: 2022-01-01

## 2022-01-01 RX ORDER — SODIUM CHLORIDE 9 MG/ML
INJECTION, SOLUTION INTRAVENOUS CONTINUOUS PRN
Status: DISCONTINUED | OUTPATIENT
Start: 2022-01-01 | End: 2022-01-01 | Stop reason: SDUPTHER

## 2022-01-01 RX ORDER — LORAZEPAM 2 MG/ML
1 INJECTION INTRAMUSCULAR
Status: DISCONTINUED | OUTPATIENT
Start: 2022-01-01 | End: 2022-01-01 | Stop reason: HOSPADM

## 2022-01-01 RX ORDER — SODIUM CHLORIDE 9 MG/ML
INJECTION, SOLUTION INTRAVENOUS CONTINUOUS
Status: DISCONTINUED | OUTPATIENT
Start: 2022-01-01 | End: 2022-01-01

## 2022-01-01 RX ORDER — MINERAL OIL AND WHITE PETROLATUM 150; 830 MG/G; MG/G
OINTMENT OPHTHALMIC EVERY 4 HOURS
Status: DISCONTINUED | OUTPATIENT
Start: 2022-01-01 | End: 2022-01-01

## 2022-01-01 RX ORDER — ETOMIDATE 2 MG/ML
INJECTION INTRAVENOUS PRN
Status: DISCONTINUED | OUTPATIENT
Start: 2022-01-01 | End: 2022-01-01 | Stop reason: SDUPTHER

## 2022-01-01 RX ORDER — INSULIN LISPRO 100 [IU]/ML
0-16 INJECTION, SOLUTION INTRAVENOUS; SUBCUTANEOUS EVERY 4 HOURS
Status: DISCONTINUED | OUTPATIENT
Start: 2022-01-01 | End: 2022-01-01

## 2022-01-01 RX ORDER — VECURONIUM BROMIDE 1 MG/ML
INJECTION, POWDER, LYOPHILIZED, FOR SOLUTION INTRAVENOUS PRN
Status: DISCONTINUED | OUTPATIENT
Start: 2022-01-01 | End: 2022-01-01 | Stop reason: SDUPTHER

## 2022-01-01 RX ORDER — ALBUMIN, HUMAN INJ 5% 5 %
25 SOLUTION INTRAVENOUS ONCE
Status: COMPLETED | OUTPATIENT
Start: 2022-01-01 | End: 2022-01-01

## 2022-01-01 RX ORDER — CARVEDILOL 25 MG/1
25 TABLET ORAL 2 TIMES DAILY WITH MEALS
Qty: 60 TABLET | Refills: 5 | Status: SHIPPED | OUTPATIENT
Start: 2022-01-01

## 2022-01-01 RX ORDER — FLUOXETINE HYDROCHLORIDE 20 MG/1
20 CAPSULE ORAL DAILY
Status: DISCONTINUED | OUTPATIENT
Start: 2022-01-01 | End: 2022-01-01

## 2022-01-01 RX ORDER — SODIUM CHLORIDE, SODIUM LACTATE, POTASSIUM CHLORIDE, AND CALCIUM CHLORIDE .6; .31; .03; .02 G/100ML; G/100ML; G/100ML; G/100ML
1000 INJECTION, SOLUTION INTRAVENOUS
Status: COMPLETED | OUTPATIENT
Start: 2022-01-01 | End: 2022-01-01

## 2022-01-01 RX ORDER — CHLORHEXIDINE GLUCONATE 0.12 MG/ML
15 RINSE ORAL 2 TIMES DAILY
Status: DISCONTINUED | OUTPATIENT
Start: 2022-01-01 | End: 2022-01-01

## 2022-01-01 RX ORDER — ISOSORBIDE MONONITRATE 30 MG/1
30 TABLET, EXTENDED RELEASE ORAL DAILY
Qty: 30 TABLET | Refills: 3 | Status: SHIPPED | OUTPATIENT
Start: 2022-01-01

## 2022-01-01 RX ORDER — ROSUVASTATIN CALCIUM 20 MG/1
20 TABLET, COATED ORAL DAILY
Qty: 90 TABLET | Refills: 3 | Status: SHIPPED | OUTPATIENT
Start: 2022-01-01

## 2022-01-01 RX ORDER — POLYETHYLENE GLYCOL 3350 17 G/17G
17 POWDER, FOR SOLUTION ORAL DAILY
Status: DISCONTINUED | OUTPATIENT
Start: 2022-01-01 | End: 2022-01-01

## 2022-01-01 RX ORDER — CALCIUM CHLORIDE 100 MG/ML
1000 INJECTION INTRAVENOUS; INTRAVENTRICULAR ONCE
Status: COMPLETED | OUTPATIENT
Start: 2022-01-01 | End: 2022-01-01

## 2022-01-01 RX ORDER — SODIUM HYPOCHLORITE 2.5 MG/ML
SOLUTION TOPICAL DAILY
Status: DISCONTINUED | OUTPATIENT
Start: 2022-01-01 | End: 2022-01-01

## 2022-01-01 RX ORDER — ATROPINE SULFATE 1 MG/ML
0.5 INJECTION, SOLUTION INTRAMUSCULAR; INTRAVENOUS; SUBCUTANEOUS ONCE
Status: COMPLETED | OUTPATIENT
Start: 2022-01-01 | End: 2022-01-01

## 2022-01-01 RX ORDER — LORAZEPAM 2 MG/ML
0.5 INJECTION INTRAMUSCULAR
Status: DISCONTINUED | OUTPATIENT
Start: 2022-01-01 | End: 2022-01-01 | Stop reason: HOSPADM

## 2022-01-01 RX ADMIN — Medication 125 MCG/HR: at 12:39

## 2022-01-01 RX ADMIN — GLYCOPYRROLATE 0.2 MG: 0.2 INJECTION INTRAMUSCULAR; INTRAVENOUS at 14:16

## 2022-01-01 RX ADMIN — HEPARIN SODIUM 5000 UNITS: 10000 INJECTION, SOLUTION INTRAVENOUS; SUBCUTANEOUS at 14:00

## 2022-01-01 RX ADMIN — VANCOMYCIN HYDROCHLORIDE 1000 MG: 1 INJECTION, POWDER, LYOPHILIZED, FOR SOLUTION INTRAVENOUS at 21:22

## 2022-01-01 RX ADMIN — POLYETHYLENE GLYCOL 3350 17 G: 17 POWDER, FOR SOLUTION ORAL at 09:18

## 2022-01-01 RX ADMIN — VASOPRESSIN 0.04 UNITS/MIN: 20 INJECTION INTRAVENOUS at 20:05

## 2022-01-01 RX ADMIN — ROCURONIUM BROMIDE 30 MG: 10 SOLUTION INTRAVENOUS at 21:38

## 2022-01-01 RX ADMIN — PIPERACILLIN AND TAZOBACTAM 4500 MG: 4; .5 INJECTION, POWDER, FOR SOLUTION INTRAVENOUS at 17:24

## 2022-01-01 RX ADMIN — POTASSIUM CHLORIDE 10 MEQ: 7.46 INJECTION, SOLUTION INTRAVENOUS at 23:42

## 2022-01-01 RX ADMIN — POLYVINYL ALCOHOL 1 DROP: 14 SOLUTION/ DROPS OPHTHALMIC at 18:56

## 2022-01-01 RX ADMIN — LORAZEPAM 0.5 MG: 2 INJECTION INTRAMUSCULAR; INTRAVENOUS at 15:16

## 2022-01-01 RX ADMIN — SODIUM CHLORIDE 40 MG: 9 INJECTION INTRAMUSCULAR; INTRAVENOUS; SUBCUTANEOUS at 08:43

## 2022-01-01 RX ADMIN — SODIUM CHLORIDE, PRESERVATIVE FREE 10 ML: 5 INJECTION INTRAVENOUS at 14:17

## 2022-01-01 RX ADMIN — PIPERACILLIN AND TAZOBACTAM 4500 MG: 4; .5 INJECTION, POWDER, FOR SOLUTION INTRAVENOUS at 05:47

## 2022-01-01 RX ADMIN — MINERAL OIL AND WHITE PETROLATUM: 150; 830 OINTMENT OPHTHALMIC at 05:13

## 2022-01-01 RX ADMIN — SODIUM CHLORIDE, PRESERVATIVE FREE 10 ML: 5 INJECTION INTRAVENOUS at 12:11

## 2022-01-01 RX ADMIN — PIPERACILLIN AND TAZOBACTAM 3375 MG: 3; .375 INJECTION, POWDER, LYOPHILIZED, FOR SOLUTION INTRAVENOUS at 07:08

## 2022-01-01 RX ADMIN — VASOPRESSIN 0.04 UNITS/MIN: 20 INJECTION INTRAVENOUS at 19:15

## 2022-01-01 RX ADMIN — VASOPRESSIN 0.04 UNITS/MIN: 20 INJECTION INTRAVENOUS at 03:20

## 2022-01-01 RX ADMIN — SODIUM CHLORIDE, PRESERVATIVE FREE 10 ML: 5 INJECTION INTRAVENOUS at 07:31

## 2022-01-01 RX ADMIN — FLUOXETINE 20 MG: 20 CAPSULE ORAL at 08:43

## 2022-01-01 RX ADMIN — POLYVINYL ALCOHOL 1 DROP: 14 SOLUTION/ DROPS OPHTHALMIC at 02:37

## 2022-01-01 RX ADMIN — Medication 10.1 MILLICURIE: at 07:31

## 2022-01-01 RX ADMIN — CLINDAMYCIN IN 5 PERCENT DEXTROSE 900 MG: 18 INJECTION, SOLUTION INTRAVENOUS at 23:30

## 2022-01-01 RX ADMIN — SODIUM CHLORIDE, PRESERVATIVE FREE 10 ML: 5 INJECTION INTRAVENOUS at 09:16

## 2022-01-01 RX ADMIN — HYDROCORTISONE SODIUM SUCCINATE 100 MG: 100 INJECTION, POWDER, FOR SOLUTION INTRAMUSCULAR; INTRAVENOUS at 20:30

## 2022-01-01 RX ADMIN — VANCOMYCIN HYDROCHLORIDE 2000 MG: 10 INJECTION, POWDER, LYOPHILIZED, FOR SOLUTION INTRAVENOUS at 22:51

## 2022-01-01 RX ADMIN — SODIUM CHLORIDE 0.5 UNITS/KG/HR: 9 INJECTION, SOLUTION INTRAVENOUS at 00:00

## 2022-01-01 RX ADMIN — SODIUM CHLORIDE 0.1 UNITS/KG/HR: 9 INJECTION, SOLUTION INTRAVENOUS at 14:47

## 2022-01-01 RX ADMIN — CLINDAMYCIN IN 5 PERCENT DEXTROSE 900 MG: 18 INJECTION, SOLUTION INTRAVENOUS at 08:29

## 2022-01-01 RX ADMIN — MINERAL OIL AND WHITE PETROLATUM: 150; 830 OINTMENT OPHTHALMIC at 12:45

## 2022-01-01 RX ADMIN — SODIUM BICARBONATE: 84 INJECTION, SOLUTION INTRAVENOUS at 09:20

## 2022-01-01 RX ADMIN — SODIUM CHLORIDE 0.3 MCG/KG/HR: 9 INJECTION, SOLUTION INTRAVENOUS at 21:01

## 2022-01-01 RX ADMIN — POTASSIUM CHLORIDE 10 MEQ: 7.46 INJECTION, SOLUTION INTRAVENOUS at 04:00

## 2022-01-01 RX ADMIN — POLYVINYL ALCOHOL 1 DROP: 14 SOLUTION/ DROPS OPHTHALMIC at 03:30

## 2022-01-01 RX ADMIN — ROSUVASTATIN 10 MG: 10 TABLET, FILM COATED ORAL at 08:43

## 2022-01-01 RX ADMIN — EPINEPHRINE 12 MCG/MIN: 1 INJECTION, SOLUTION, CONCENTRATE INTRAVENOUS at 17:05

## 2022-01-01 RX ADMIN — MINERAL OIL AND WHITE PETROLATUM: 150; 830 OINTMENT OPHTHALMIC at 16:45

## 2022-01-01 RX ADMIN — MINERAL OIL AND WHITE PETROLATUM: 150; 830 OINTMENT OPHTHALMIC at 20:51

## 2022-01-01 RX ADMIN — SODIUM CHLORIDE 40 MG: 9 INJECTION INTRAMUSCULAR; INTRAVENOUS; SUBCUTANEOUS at 08:56

## 2022-01-01 RX ADMIN — ACETAMINOPHEN 650 MG: 160 SOLUTION ORAL at 17:07

## 2022-01-01 RX ADMIN — ACETAMINOPHEN 650 MG: 160 SOLUTION ORAL at 21:30

## 2022-01-01 RX ADMIN — Medication 25 MCG/HR: at 05:07

## 2022-01-01 RX ADMIN — VASOPRESSIN 0.04 UNITS/MIN: 20 INJECTION INTRAVENOUS at 11:08

## 2022-01-01 RX ADMIN — Medication 38 MCG/MIN: at 14:45

## 2022-01-01 RX ADMIN — MINERAL OIL AND WHITE PETROLATUM: 150; 830 OINTMENT OPHTHALMIC at 20:45

## 2022-01-01 RX ADMIN — FENTANYL CITRATE 12.5 MCG: 50 INJECTION, SOLUTION INTRAMUSCULAR; INTRAVENOUS at 14:16

## 2022-01-01 RX ADMIN — MINERAL OIL AND WHITE PETROLATUM: 150; 830 OINTMENT OPHTHALMIC at 05:00

## 2022-01-01 RX ADMIN — SODIUM BICARBONATE: 84 INJECTION, SOLUTION INTRAVENOUS at 21:00

## 2022-01-01 RX ADMIN — INSULIN LISPRO 8 UNITS: 100 INJECTION, SOLUTION INTRAVENOUS; SUBCUTANEOUS at 23:52

## 2022-01-01 RX ADMIN — EPINEPHRINE 15 MCG/MIN: 1 INJECTION, SOLUTION, CONCENTRATE INTRAVENOUS at 15:49

## 2022-01-01 RX ADMIN — SODIUM CHLORIDE, PRESERVATIVE FREE 10 ML: 5 INJECTION INTRAVENOUS at 21:24

## 2022-01-01 RX ADMIN — ACETAMINOPHEN 650 MG: 160 SOLUTION ORAL at 14:29

## 2022-01-01 RX ADMIN — VASOPRESSIN 0.04 UNITS/MIN: 20 INJECTION INTRAVENOUS at 03:55

## 2022-01-01 RX ADMIN — POTASSIUM CHLORIDE 10 MEQ: 7.46 INJECTION, SOLUTION INTRAVENOUS at 01:42

## 2022-01-01 RX ADMIN — POLYVINYL ALCOHOL 1 DROP: 14 SOLUTION/ DROPS OPHTHALMIC at 15:00

## 2022-01-01 RX ADMIN — VECURONIUM BROMIDE 5 MG: 1 INJECTION, POWDER, LYOPHILIZED, FOR SOLUTION INTRAVENOUS at 01:22

## 2022-01-01 RX ADMIN — POTASSIUM CHLORIDE 10 MEQ: 7.46 INJECTION, SOLUTION INTRAVENOUS at 02:25

## 2022-01-01 RX ADMIN — Medication 10 MCG/MIN: at 15:13

## 2022-01-01 RX ADMIN — POLYVINYL ALCOHOL 1 DROP: 14 SOLUTION/ DROPS OPHTHALMIC at 23:20

## 2022-01-01 RX ADMIN — SODIUM CHLORIDE, PRESERVATIVE FREE 10 ML: 5 INJECTION INTRAVENOUS at 09:15

## 2022-01-01 RX ADMIN — HEPARIN SODIUM 5000 UNITS: 10000 INJECTION, SOLUTION INTRAVENOUS; SUBCUTANEOUS at 14:24

## 2022-01-01 RX ADMIN — MINERAL OIL AND WHITE PETROLATUM: 150; 830 OINTMENT OPHTHALMIC at 04:42

## 2022-01-01 RX ADMIN — PHENYLEPHRINE HYDROCHLORIDE 200 MCG: 10 INJECTION INTRAVENOUS at 21:52

## 2022-01-01 RX ADMIN — POTASSIUM CHLORIDE 10 MEQ: 7.46 INJECTION, SOLUTION INTRAVENOUS at 01:25

## 2022-01-01 RX ADMIN — POLYVINYL ALCOHOL 1 DROP: 14 SOLUTION/ DROPS OPHTHALMIC at 07:57

## 2022-01-01 RX ADMIN — MINERAL OIL AND WHITE PETROLATUM: 150; 830 OINTMENT OPHTHALMIC at 00:31

## 2022-01-01 RX ADMIN — SODIUM CHLORIDE, PRESERVATIVE FREE 10 ML: 5 INJECTION INTRAVENOUS at 08:57

## 2022-01-01 RX ADMIN — SODIUM CHLORIDE 1000 ML: 9 INJECTION, SOLUTION INTRAVENOUS at 23:34

## 2022-01-01 RX ADMIN — 0.12% CHLORHEXIDINE GLUCONATE 15 ML: 1.2 RINSE ORAL at 20:30

## 2022-01-01 RX ADMIN — POLYVINYL ALCOHOL 1 DROP: 14 SOLUTION/ DROPS OPHTHALMIC at 19:02

## 2022-01-01 RX ADMIN — SODIUM CHLORIDE, POTASSIUM CHLORIDE, SODIUM LACTATE AND CALCIUM CHLORIDE: 600; 310; 30; 20 INJECTION, SOLUTION INTRAVENOUS at 02:37

## 2022-01-01 RX ADMIN — EPINEPHRINE 10 MCG/MIN: 1 INJECTION, SOLUTION, CONCENTRATE INTRAVENOUS at 21:03

## 2022-01-01 RX ADMIN — CALCIUM GLUCONATE 4000 MG: 98 INJECTION, SOLUTION INTRAVENOUS at 11:09

## 2022-01-01 RX ADMIN — ACETAMINOPHEN 650 MG: 160 SOLUTION ORAL at 03:30

## 2022-01-01 RX ADMIN — SODIUM CHLORIDE 1000 ML: 9 INJECTION, SOLUTION INTRAVENOUS at 20:43

## 2022-01-01 RX ADMIN — HYOSCYAMINE SULFATE: 16 SOLUTION at 16:35

## 2022-01-01 RX ADMIN — CALCIUM CHLORIDE INJECTION 1000 MG: 100 INJECTION, SOLUTION INTRAVENOUS at 15:46

## 2022-01-01 RX ADMIN — SODIUM CHLORIDE, PRESERVATIVE FREE 10 ML: 5 INJECTION INTRAVENOUS at 12:19

## 2022-01-01 RX ADMIN — EPINEPHRINE 10 MCG/MIN: 1 INJECTION, SOLUTION, CONCENTRATE INTRAVENOUS at 06:22

## 2022-01-01 RX ADMIN — POLYVINYL ALCOHOL 1 DROP: 14 SOLUTION/ DROPS OPHTHALMIC at 11:19

## 2022-01-01 RX ADMIN — SODIUM CHLORIDE, PRESERVATIVE FREE 10 ML: 5 INJECTION INTRAVENOUS at 09:03

## 2022-01-01 RX ADMIN — Medication 100 MCG/HR: at 22:05

## 2022-01-01 RX ADMIN — VASOPRESSIN 0.04 UNITS/MIN: 20 INJECTION INTRAVENOUS at 08:56

## 2022-01-01 RX ADMIN — HYDROCORTISONE SODIUM SUCCINATE 100 MG: 100 INJECTION, POWDER, FOR SOLUTION INTRAMUSCULAR; INTRAVENOUS at 12:10

## 2022-01-01 RX ADMIN — SODIUM BICARBONATE: 84 INJECTION, SOLUTION INTRAVENOUS at 22:34

## 2022-01-01 RX ADMIN — SODIUM CHLORIDE 40 MG: 9 INJECTION INTRAMUSCULAR; INTRAVENOUS; SUBCUTANEOUS at 09:02

## 2022-01-01 RX ADMIN — FLUOXETINE 20 MG: 20 CAPSULE ORAL at 08:56

## 2022-01-01 RX ADMIN — Medication 200 MG: at 01:13

## 2022-01-01 RX ADMIN — MINERAL OIL AND WHITE PETROLATUM: 150; 830 OINTMENT OPHTHALMIC at 09:11

## 2022-01-01 RX ADMIN — Medication 45 MCG/MIN: at 04:16

## 2022-01-01 RX ADMIN — MINERAL OIL AND WHITE PETROLATUM: 150; 830 OINTMENT OPHTHALMIC at 12:19

## 2022-01-01 RX ADMIN — Medication 45 MCG/MIN: at 07:07

## 2022-01-01 RX ADMIN — SODIUM CHLORIDE, PRESERVATIVE FREE 10 ML: 5 INJECTION INTRAVENOUS at 08:43

## 2022-01-01 RX ADMIN — SODIUM CHLORIDE 40 MG: 9 INJECTION INTRAMUSCULAR; INTRAVENOUS; SUBCUTANEOUS at 09:19

## 2022-01-01 RX ADMIN — MINERAL OIL AND WHITE PETROLATUM: 150; 830 OINTMENT OPHTHALMIC at 08:43

## 2022-01-01 RX ADMIN — SODIUM CHLORIDE 0.11 UNITS/KG/HR: 9 INJECTION, SOLUTION INTRAVENOUS at 13:09

## 2022-01-01 RX ADMIN — SODIUM BICARBONATE: 84 INJECTION, SOLUTION INTRAVENOUS at 06:22

## 2022-01-01 RX ADMIN — Medication 1500 MG: at 08:02

## 2022-01-01 RX ADMIN — SODIUM CHLORIDE 1000 ML: 9 INJECTION, SOLUTION INTRAVENOUS at 18:19

## 2022-01-01 RX ADMIN — CLINDAMYCIN IN 5 PERCENT DEXTROSE 900 MG: 18 INJECTION, SOLUTION INTRAVENOUS at 07:52

## 2022-01-01 RX ADMIN — MINERAL OIL AND WHITE PETROLATUM: 150; 830 OINTMENT OPHTHALMIC at 00:24

## 2022-01-01 RX ADMIN — EPINEPHRINE 5 MG: 1 INJECTION, SOLUTION, CONCENTRATE INTRAVENOUS at 01:20

## 2022-01-01 RX ADMIN — VASOPRESSIN 0.04 UNITS/MIN: 20 INJECTION INTRAVENOUS at 13:08

## 2022-01-01 RX ADMIN — HYDROCORTISONE SODIUM SUCCINATE 100 MG: 100 INJECTION, POWDER, FOR SOLUTION INTRAMUSCULAR; INTRAVENOUS at 20:20

## 2022-01-01 RX ADMIN — MINERAL OIL AND WHITE PETROLATUM: 150; 830 OINTMENT OPHTHALMIC at 08:57

## 2022-01-01 RX ADMIN — VASOPRESSIN 0.04 UNITS/MIN: 20 INJECTION INTRAVENOUS at 04:08

## 2022-01-01 RX ADMIN — CLINDAMYCIN IN 5 PERCENT DEXTROSE 900 MG: 18 INJECTION, SOLUTION INTRAVENOUS at 22:56

## 2022-01-01 RX ADMIN — HYOSCYAMINE SULFATE: 16 SOLUTION at 09:22

## 2022-01-01 RX ADMIN — CLINDAMYCIN IN 5 PERCENT DEXTROSE 900 MG: 18 INJECTION, SOLUTION INTRAVENOUS at 06:30

## 2022-01-01 RX ADMIN — ACETAMINOPHEN 650 MG: 160 SOLUTION ORAL at 09:18

## 2022-01-01 RX ADMIN — MINERAL OIL AND WHITE PETROLATUM: 150; 830 OINTMENT OPHTHALMIC at 16:41

## 2022-01-01 RX ADMIN — POLYVINYL ALCOHOL 1 DROP: 14 SOLUTION/ DROPS OPHTHALMIC at 06:30

## 2022-01-01 RX ADMIN — Medication 8 UNITS: at 23:49

## 2022-01-01 RX ADMIN — SODIUM CHLORIDE 0.1 UNITS/KG/HR: 9 INJECTION, SOLUTION INTRAVENOUS at 04:35

## 2022-01-01 RX ADMIN — SODIUM CHLORIDE, POTASSIUM CHLORIDE, SODIUM LACTATE AND CALCIUM CHLORIDE 1000 ML: 600; 310; 30; 20 INJECTION, SOLUTION INTRAVENOUS at 00:40

## 2022-01-01 RX ADMIN — SODIUM CHLORIDE: 9 INJECTION, SOLUTION INTRAVENOUS at 21:38

## 2022-01-01 RX ADMIN — 0.12% CHLORHEXIDINE GLUCONATE 15 ML: 1.2 RINSE ORAL at 21:23

## 2022-01-01 RX ADMIN — PIPERACILLIN AND TAZOBACTAM 4500 MG: 4; .5 INJECTION, POWDER, FOR SOLUTION INTRAVENOUS at 17:08

## 2022-01-01 RX ADMIN — Medication 20 MCG/MIN: at 00:02

## 2022-01-01 RX ADMIN — Medication 125 MCG/HR: at 15:39

## 2022-01-01 RX ADMIN — SODIUM CHLORIDE, PRESERVATIVE FREE 10 ML: 5 INJECTION INTRAVENOUS at 09:20

## 2022-01-01 RX ADMIN — SODIUM CHLORIDE 0.35 UNITS/KG/HR: 9 INJECTION, SOLUTION INTRAVENOUS at 19:52

## 2022-01-01 RX ADMIN — Medication 31.8 MILLICURIE: at 09:15

## 2022-01-01 RX ADMIN — 0.12% CHLORHEXIDINE GLUCONATE 15 ML: 1.2 RINSE ORAL at 08:43

## 2022-01-01 RX ADMIN — ACETAMINOPHEN 650 MG: 160 SOLUTION ORAL at 15:11

## 2022-01-01 RX ADMIN — POLYVINYL ALCOHOL 1 DROP: 14 SOLUTION/ DROPS OPHTHALMIC at 11:13

## 2022-01-01 RX ADMIN — HEPARIN SODIUM 5000 UNITS: 10000 INJECTION, SOLUTION INTRAVENOUS; SUBCUTANEOUS at 05:36

## 2022-01-01 RX ADMIN — HYDROCORTISONE SODIUM SUCCINATE 100 MG: 100 INJECTION, POWDER, FOR SOLUTION INTRAMUSCULAR; INTRAVENOUS at 05:37

## 2022-01-01 RX ADMIN — 0.12% CHLORHEXIDINE GLUCONATE 15 ML: 1.2 RINSE ORAL at 09:18

## 2022-01-01 RX ADMIN — ROSUVASTATIN 10 MG: 10 TABLET, FILM COATED ORAL at 09:11

## 2022-01-01 RX ADMIN — MINERAL OIL AND WHITE PETROLATUM: 150; 830 OINTMENT OPHTHALMIC at 05:37

## 2022-01-01 RX ADMIN — ROSUVASTATIN 10 MG: 10 TABLET, FILM COATED ORAL at 08:56

## 2022-01-01 RX ADMIN — POLYVINYL ALCOHOL 1 DROP: 14 SOLUTION/ DROPS OPHTHALMIC at 03:24

## 2022-01-01 RX ADMIN — VASOPRESSIN 0.04 UNITS/MIN: 20 INJECTION INTRAVENOUS at 05:18

## 2022-01-01 RX ADMIN — SODIUM BICARBONATE: 84 INJECTION, SOLUTION INTRAVENOUS at 16:42

## 2022-01-01 RX ADMIN — HYDROCORTISONE SODIUM SUCCINATE 100 MG: 100 INJECTION, POWDER, FOR SOLUTION INTRAMUSCULAR; INTRAVENOUS at 05:13

## 2022-01-01 RX ADMIN — ACETAMINOPHEN 650 MG: 160 SOLUTION ORAL at 09:11

## 2022-01-01 RX ADMIN — VASOPRESSIN 1 UNITS: 20 INJECTION INTRAVENOUS at 22:55

## 2022-01-01 RX ADMIN — CLINDAMYCIN IN 5 PERCENT DEXTROSE 900 MG: 18 INJECTION, SOLUTION INTRAVENOUS at 23:20

## 2022-01-01 RX ADMIN — HEPARIN SODIUM 5000 UNITS: 10000 INJECTION, SOLUTION INTRAVENOUS; SUBCUTANEOUS at 07:19

## 2022-01-01 RX ADMIN — CLINDAMYCIN IN 5 PERCENT DEXTROSE 900 MG: 18 INJECTION, SOLUTION INTRAVENOUS at 15:44

## 2022-01-01 RX ADMIN — SODIUM BICARBONATE: 84 INJECTION, SOLUTION INTRAVENOUS at 06:07

## 2022-01-01 RX ADMIN — Medication 35 MCG/MIN: at 14:04

## 2022-01-01 RX ADMIN — POLYVINYL ALCOHOL 1 DROP: 14 SOLUTION/ DROPS OPHTHALMIC at 11:18

## 2022-01-01 RX ADMIN — CALCIUM GLUCONATE 4000 MG: 98 INJECTION, SOLUTION INTRAVENOUS at 21:09

## 2022-01-01 RX ADMIN — CALCIUM GLUCONATE 4000 MG: 98 INJECTION, SOLUTION INTRAVENOUS at 12:42

## 2022-01-01 RX ADMIN — Medication 20 MCG/MIN: at 21:04

## 2022-01-01 RX ADMIN — SODIUM CHLORIDE 0.29 UNITS/KG/HR: 9 INJECTION, SOLUTION INTRAVENOUS at 15:08

## 2022-01-01 RX ADMIN — VASOPRESSIN 0.04 UNITS/MIN: 20 INJECTION INTRAVENOUS at 14:15

## 2022-01-01 RX ADMIN — CLINDAMYCIN IN 5 PERCENT DEXTROSE 900 MG: 18 INJECTION, SOLUTION INTRAVENOUS at 14:35

## 2022-01-01 RX ADMIN — SODIUM BICARBONATE: 84 INJECTION, SOLUTION INTRAVENOUS at 11:03

## 2022-01-01 RX ADMIN — SODIUM CHLORIDE, SODIUM GLUCONATE, SODIUM ACETATE, POTASSIUM CHLORIDE AND MAGNESIUM CHLORIDE: 526; 502; 368; 37; 30 INJECTION, SOLUTION INTRAVENOUS at 01:39

## 2022-01-01 RX ADMIN — CLINDAMYCIN IN 5 PERCENT DEXTROSE 900 MG: 18 INJECTION, SOLUTION INTRAVENOUS at 22:54

## 2022-01-01 RX ADMIN — Medication 100 MCG/HR: at 04:25

## 2022-01-01 RX ADMIN — POTASSIUM CHLORIDE 10 MEQ: 7.46 INJECTION, SOLUTION INTRAVENOUS at 03:26

## 2022-01-01 RX ADMIN — MINERAL OIL AND WHITE PETROLATUM: 150; 830 OINTMENT OPHTHALMIC at 17:03

## 2022-01-01 RX ADMIN — MINERAL OIL AND WHITE PETROLATUM: 150; 830 OINTMENT OPHTHALMIC at 09:19

## 2022-01-01 RX ADMIN — POLYETHYLENE GLYCOL 3350 17 G: 17 POWDER, FOR SOLUTION ORAL at 09:11

## 2022-01-01 RX ADMIN — POLYVINYL ALCOHOL 1 DROP: 14 SOLUTION/ DROPS OPHTHALMIC at 11:00

## 2022-01-01 RX ADMIN — HYDROCORTISONE SODIUM SUCCINATE 100 MG: 100 INJECTION, POWDER, FOR SOLUTION INTRAMUSCULAR; INTRAVENOUS at 05:36

## 2022-01-01 RX ADMIN — HEPARIN SODIUM 5000 UNITS: 10000 INJECTION, SOLUTION INTRAVENOUS; SUBCUTANEOUS at 14:08

## 2022-01-01 RX ADMIN — MAGNESIUM SULFATE HEPTAHYDRATE 2000 MG: 40 INJECTION, SOLUTION INTRAVENOUS at 21:08

## 2022-01-01 RX ADMIN — VASOPRESSIN 1 UNITS: 20 INJECTION INTRAVENOUS at 22:19

## 2022-01-01 RX ADMIN — SODIUM BICARBONATE: 84 INJECTION, SOLUTION INTRAVENOUS at 15:37

## 2022-01-01 RX ADMIN — INSULIN LISPRO 8 UNITS: 100 INJECTION, SOLUTION INTRAVENOUS; SUBCUTANEOUS at 09:06

## 2022-01-01 RX ADMIN — POLYETHYLENE GLYCOL 3350 17 G: 17 POWDER, FOR SOLUTION ORAL at 09:01

## 2022-01-01 RX ADMIN — 0.12% CHLORHEXIDINE GLUCONATE 15 ML: 1.2 RINSE ORAL at 20:50

## 2022-01-01 RX ADMIN — MINERAL OIL AND WHITE PETROLATUM: 150; 830 OINTMENT OPHTHALMIC at 12:07

## 2022-01-01 RX ADMIN — MAGNESIUM SULFATE HEPTAHYDRATE 2000 MG: 40 INJECTION, SOLUTION INTRAVENOUS at 12:19

## 2022-01-01 RX ADMIN — PIPERACILLIN AND TAZOBACTAM 4500 MG: 4; .5 INJECTION, POWDER, FOR SOLUTION INTRAVENOUS at 17:14

## 2022-01-01 RX ADMIN — SODIUM CHLORIDE 40 MG: 9 INJECTION INTRAMUSCULAR; INTRAVENOUS; SUBCUTANEOUS at 09:11

## 2022-01-01 RX ADMIN — Medication 55 MCG/MIN: at 20:54

## 2022-01-01 RX ADMIN — POLYVINYL ALCOHOL 1 DROP: 14 SOLUTION/ DROPS OPHTHALMIC at 14:30

## 2022-01-01 RX ADMIN — FLUOXETINE 20 MG: 20 CAPSULE ORAL at 09:19

## 2022-01-01 RX ADMIN — MINERAL OIL AND WHITE PETROLATUM: 150; 830 OINTMENT OPHTHALMIC at 01:25

## 2022-01-01 RX ADMIN — FENTANYL CITRATE 12.5 MCG: 50 INJECTION, SOLUTION INTRAMUSCULAR; INTRAVENOUS at 15:16

## 2022-01-01 RX ADMIN — MINERAL OIL AND WHITE PETROLATUM: 150; 830 OINTMENT OPHTHALMIC at 16:43

## 2022-01-01 RX ADMIN — CALCIUM GLUCONATE 4000 MG: 98 INJECTION, SOLUTION INTRAVENOUS at 19:04

## 2022-01-01 RX ADMIN — POLYVINYL ALCOHOL 1 DROP: 14 SOLUTION/ DROPS OPHTHALMIC at 04:42

## 2022-01-01 RX ADMIN — HEPARIN SODIUM 5000 UNITS: 10000 INJECTION, SOLUTION INTRAVENOUS; SUBCUTANEOUS at 22:53

## 2022-01-01 RX ADMIN — SODIUM CHLORIDE: 9 INJECTION, SOLUTION INTRAVENOUS at 01:07

## 2022-01-01 RX ADMIN — ALBUMIN (HUMAN) 25 G: 12.5 INJECTION, SOLUTION INTRAVENOUS at 23:49

## 2022-01-01 RX ADMIN — ROSUVASTATIN 10 MG: 10 TABLET, FILM COATED ORAL at 09:09

## 2022-01-01 RX ADMIN — FENTANYL CITRATE 50 MCG: 50 INJECTION, SOLUTION INTRAMUSCULAR; INTRAVENOUS at 01:18

## 2022-01-01 RX ADMIN — SODIUM BICARBONATE 50 MEQ: 84 INJECTION INTRAVENOUS at 15:48

## 2022-01-01 RX ADMIN — EPINEPHRINE 12 MCG/MIN: 1 INJECTION, SOLUTION, CONCENTRATE INTRAVENOUS at 14:46

## 2022-01-01 RX ADMIN — HEPARIN SODIUM 5000 UNITS: 10000 INJECTION, SOLUTION INTRAVENOUS; SUBCUTANEOUS at 06:26

## 2022-01-01 RX ADMIN — MINERAL OIL AND WHITE PETROLATUM: 150; 830 OINTMENT OPHTHALMIC at 09:02

## 2022-01-01 RX ADMIN — SODIUM CHLORIDE, PRESERVATIVE FREE 10 ML: 5 INJECTION INTRAVENOUS at 21:21

## 2022-01-01 RX ADMIN — CLINDAMYCIN IN 5 PERCENT DEXTROSE 900 MG: 18 INJECTION, SOLUTION INTRAVENOUS at 14:47

## 2022-01-01 RX ADMIN — SODIUM CHLORIDE, POTASSIUM CHLORIDE, SODIUM LACTATE AND CALCIUM CHLORIDE 1000 ML: 600; 310; 30; 20 INJECTION, SOLUTION INTRAVENOUS at 01:11

## 2022-01-01 RX ADMIN — HEPARIN SODIUM 5000 UNITS: 10000 INJECTION, SOLUTION INTRAVENOUS; SUBCUTANEOUS at 05:30

## 2022-01-01 RX ADMIN — CLINDAMYCIN IN 5 PERCENT DEXTROSE 900 MG: 18 INJECTION, SOLUTION INTRAVENOUS at 07:06

## 2022-01-01 RX ADMIN — 0.12% CHLORHEXIDINE GLUCONATE 15 ML: 1.2 RINSE ORAL at 09:11

## 2022-01-01 RX ADMIN — SODIUM CHLORIDE, POTASSIUM CHLORIDE, SODIUM LACTATE AND CALCIUM CHLORIDE 1000 ML: 600; 310; 30; 20 INJECTION, SOLUTION INTRAVENOUS at 06:15

## 2022-01-01 RX ADMIN — SODIUM CHLORIDE: 9 INJECTION, SOLUTION INTRAVENOUS at 04:52

## 2022-01-01 RX ADMIN — SODIUM CHLORIDE 0.5 UNITS/KG/HR: 9 INJECTION, SOLUTION INTRAVENOUS at 22:13

## 2022-01-01 RX ADMIN — MINERAL OIL AND WHITE PETROLATUM: 150; 830 OINTMENT OPHTHALMIC at 12:18

## 2022-01-01 RX ADMIN — PIPERACILLIN AND TAZOBACTAM 4500 MG: 4; .5 INJECTION, POWDER, FOR SOLUTION INTRAVENOUS at 20:01

## 2022-01-01 RX ADMIN — HEPARIN SODIUM 5000 UNITS: 10000 INJECTION, SOLUTION INTRAVENOUS; SUBCUTANEOUS at 05:37

## 2022-01-01 RX ADMIN — POLYVINYL ALCOHOL 1 DROP: 14 SOLUTION/ DROPS OPHTHALMIC at 02:41

## 2022-01-01 RX ADMIN — ROSUVASTATIN 20 MG: 20 TABLET, FILM COATED ORAL at 09:19

## 2022-01-01 RX ADMIN — POLYETHYLENE GLYCOL 3350 17 G: 17 POWDER, FOR SOLUTION ORAL at 08:56

## 2022-01-01 RX ADMIN — MINERAL OIL AND WHITE PETROLATUM: 150; 830 OINTMENT OPHTHALMIC at 20:30

## 2022-01-01 RX ADMIN — INSULIN HUMAN 10 UNITS: 100 INJECTION, SOLUTION PARENTERAL at 02:07

## 2022-01-01 RX ADMIN — VASOPRESSIN 0.04 UNITS/MIN: 20 INJECTION INTRAVENOUS at 21:03

## 2022-01-01 RX ADMIN — PIPERACILLIN AND TAZOBACTAM 4500 MG: 4; .5 INJECTION, POWDER, FOR SOLUTION INTRAVENOUS at 05:17

## 2022-01-01 RX ADMIN — POLYVINYL ALCOHOL 1 DROP: 14 SOLUTION/ DROPS OPHTHALMIC at 06:45

## 2022-01-01 RX ADMIN — SODIUM CHLORIDE 1000 ML: 9 INJECTION, SOLUTION INTRAVENOUS at 23:39

## 2022-01-01 RX ADMIN — CALCIUM GLUCONATE 4000 MG: 98 INJECTION, SOLUTION INTRAVENOUS at 01:27

## 2022-01-01 RX ADMIN — ETOMIDATE 14 MG: 20 INJECTION, SOLUTION INTRAVENOUS at 01:13

## 2022-01-01 RX ADMIN — 0.12% CHLORHEXIDINE GLUCONATE 15 ML: 1.2 RINSE ORAL at 21:44

## 2022-01-01 RX ADMIN — CALCIUM GLUCONATE 3000 MG: 98 INJECTION, SOLUTION INTRAVENOUS at 10:22

## 2022-01-01 RX ADMIN — EPINEPHRINE 12 MCG/MIN: 1 INJECTION, SOLUTION, CONCENTRATE INTRAVENOUS at 08:55

## 2022-01-01 RX ADMIN — CLINDAMYCIN IN 5 PERCENT DEXTROSE 900 MG: 18 INJECTION, SOLUTION INTRAVENOUS at 05:58

## 2022-01-01 RX ADMIN — INSULIN LISPRO 4 UNITS: 100 INJECTION, SOLUTION INTRAVENOUS; SUBCUTANEOUS at 16:41

## 2022-01-01 RX ADMIN — LORAZEPAM 0.5 MG: 2 INJECTION INTRAMUSCULAR; INTRAVENOUS at 14:16

## 2022-01-01 RX ADMIN — SODIUM BICARBONATE: 84 INJECTION, SOLUTION INTRAVENOUS at 04:02

## 2022-01-01 RX ADMIN — SODIUM CHLORIDE 1000 ML: 9 INJECTION, SOLUTION INTRAVENOUS at 06:28

## 2022-01-01 RX ADMIN — HYDROCORTISONE SODIUM SUCCINATE 100 MG: 100 INJECTION, POWDER, FOR SOLUTION INTRAMUSCULAR; INTRAVENOUS at 20:50

## 2022-01-01 RX ADMIN — 0.12% CHLORHEXIDINE GLUCONATE 15 ML: 1.2 RINSE ORAL at 08:56

## 2022-01-01 RX ADMIN — Medication 34 MCG/MIN: at 12:51

## 2022-01-01 RX ADMIN — SODIUM BICARBONATE: 84 INJECTION, SOLUTION INTRAVENOUS at 07:26

## 2022-01-01 RX ADMIN — Medication 55 MCG/MIN: at 01:44

## 2022-01-01 RX ADMIN — CLINDAMYCIN IN 5 PERCENT DEXTROSE 900 MG: 18 INJECTION, SOLUTION INTRAVENOUS at 15:15

## 2022-01-01 RX ADMIN — SODIUM BICARBONATE: 84 INJECTION, SOLUTION INTRAVENOUS at 01:44

## 2022-01-01 RX ADMIN — REGADENOSON 0.4 MG: 0.08 INJECTION, SOLUTION INTRAVENOUS at 09:15

## 2022-01-01 RX ADMIN — POLYETHYLENE GLYCOL 3350 17 G: 17 POWDER, FOR SOLUTION ORAL at 09:02

## 2022-01-01 RX ADMIN — CALCIUM GLUCONATE 4000 MG: 98 INJECTION, SOLUTION INTRAVENOUS at 04:46

## 2022-01-01 RX ADMIN — VASOPRESSIN 1 UNITS: 20 INJECTION INTRAVENOUS at 22:39

## 2022-01-01 RX ADMIN — ATROPINE SULFATE 0.5 MG: 1 INJECTION, SOLUTION INTRAMUSCULAR; INTRAVENOUS; SUBCUTANEOUS at 15:45

## 2022-01-01 RX ADMIN — HEPARIN SODIUM 5000 UNITS: 10000 INJECTION, SOLUTION INTRAVENOUS; SUBCUTANEOUS at 21:45

## 2022-01-01 RX ADMIN — Medication 18 MCG/MIN: at 13:09

## 2022-01-01 RX ADMIN — SODIUM CHLORIDE 30 ML/HR: 3 INJECTION, SOLUTION INTRAVENOUS at 11:12

## 2022-01-01 RX ADMIN — SODIUM BICARBONATE: 84 INJECTION, SOLUTION INTRAVENOUS at 18:35

## 2022-01-01 RX ADMIN — MINERAL OIL AND WHITE PETROLATUM: 150; 830 OINTMENT OPHTHALMIC at 00:34

## 2022-01-01 RX ADMIN — SODIUM CHLORIDE, PRESERVATIVE FREE 10 ML: 5 INJECTION INTRAVENOUS at 09:12

## 2022-01-01 RX ADMIN — POLYVINYL ALCOHOL 1 DROP: 14 SOLUTION/ DROPS OPHTHALMIC at 22:57

## 2022-01-01 RX ADMIN — SODIUM CHLORIDE 0.3 MCG/KG/HR: 9 INJECTION, SOLUTION INTRAVENOUS at 13:43

## 2022-01-01 RX ADMIN — CALCIUM GLUCONATE 4000 MG: 98 INJECTION, SOLUTION INTRAVENOUS at 09:01

## 2022-01-01 RX ADMIN — SODIUM CHLORIDE 0.13 UNITS/KG/HR: 9 INJECTION, SOLUTION INTRAVENOUS at 10:31

## 2022-01-01 RX ADMIN — SODIUM CHLORIDE 1000 ML: 9 INJECTION, SOLUTION INTRAVENOUS at 13:59

## 2022-01-01 RX ADMIN — CLINDAMYCIN PHOSPHATE 900 MG: 900 INJECTION, SOLUTION INTRAVENOUS at 23:37

## 2022-01-01 RX ADMIN — HEPARIN SODIUM 5000 UNITS: 10000 INJECTION, SOLUTION INTRAVENOUS; SUBCUTANEOUS at 14:29

## 2022-01-01 RX ADMIN — POLYVINYL ALCOHOL 1 DROP: 14 SOLUTION/ DROPS OPHTHALMIC at 07:20

## 2022-01-01 RX ADMIN — POLYVINYL ALCOHOL 1 DROP: 14 SOLUTION/ DROPS OPHTHALMIC at 23:30

## 2022-01-01 RX ADMIN — Medication 100 MCG/HR: at 11:37

## 2022-01-01 RX ADMIN — POLYVINYL ALCOHOL 1 DROP: 14 SOLUTION/ DROPS OPHTHALMIC at 19:14

## 2022-01-01 RX ADMIN — POLYVINYL ALCOHOL 1 DROP: 14 SOLUTION/ DROPS OPHTHALMIC at 07:04

## 2022-01-01 RX ADMIN — ACETAMINOPHEN 650 MG: 160 SOLUTION ORAL at 02:57

## 2022-01-01 RX ADMIN — POLYVINYL ALCOHOL 1 DROP: 14 SOLUTION/ DROPS OPHTHALMIC at 22:53

## 2022-01-01 RX ADMIN — PIPERACILLIN AND TAZOBACTAM 4500 MG: 4; .5 INJECTION, POWDER, FOR SOLUTION INTRAVENOUS at 05:20

## 2022-01-01 RX ADMIN — ACETAMINOPHEN 650 MG: 160 SOLUTION ORAL at 08:43

## 2022-01-01 RX ADMIN — VECURONIUM BROMIDE 3 MG: 1 INJECTION, POWDER, LYOPHILIZED, FOR SOLUTION INTRAVENOUS at 01:57

## 2022-01-01 RX ADMIN — PIPERACILLIN AND TAZOBACTAM 4500 MG: 4; .5 INJECTION, POWDER, FOR SOLUTION INTRAVENOUS at 05:38

## 2022-01-01 RX ADMIN — POTASSIUM CHLORIDE 10 MEQ: 7.46 INJECTION, SOLUTION INTRAVENOUS at 00:42

## 2022-01-01 RX ADMIN — EPINEPHRINE 12 MCG/MIN: 1 INJECTION, SOLUTION, CONCENTRATE INTRAVENOUS at 07:09

## 2022-01-01 RX ADMIN — POLYVINYL ALCOHOL 1 DROP: 14 SOLUTION/ DROPS OPHTHALMIC at 19:07

## 2022-01-01 RX ADMIN — HEPARIN SODIUM 5000 UNITS: 10000 INJECTION, SOLUTION INTRAVENOUS; SUBCUTANEOUS at 22:10

## 2022-01-01 RX ADMIN — HEPARIN SODIUM 5000 UNITS: 10000 INJECTION, SOLUTION INTRAVENOUS; SUBCUTANEOUS at 00:07

## 2022-01-01 RX ADMIN — VASOPRESSIN 0.04 UNITS/MIN: 20 INJECTION INTRAVENOUS at 12:18

## 2022-01-01 RX ADMIN — 0.12% CHLORHEXIDINE GLUCONATE 15 ML: 1.2 RINSE ORAL at 09:02

## 2022-01-01 RX ADMIN — EPINEPHRINE 12 MCG/MIN: 1 INJECTION, SOLUTION, CONCENTRATE INTRAVENOUS at 00:22

## 2022-01-01 RX ADMIN — FLUOXETINE 20 MG: 20 CAPSULE ORAL at 09:09

## 2022-01-01 RX ADMIN — FENTANYL CITRATE 50 MCG: 50 INJECTION, SOLUTION INTRAMUSCULAR; INTRAVENOUS at 01:13

## 2022-01-01 RX ADMIN — PHENYLEPHRINE HYDROCHLORIDE 100 MCG: 10 INJECTION INTRAVENOUS at 22:15

## 2022-01-01 RX ADMIN — FLUOXETINE 20 MG: 20 CAPSULE ORAL at 09:11

## 2022-01-01 RX ADMIN — INSULIN LISPRO 8 UNITS: 100 INJECTION, SOLUTION INTRAVENOUS; SUBCUTANEOUS at 20:35

## 2022-01-01 RX ADMIN — INSULIN LISPRO 8 UNITS: 100 INJECTION, SOLUTION INTRAVENOUS; SUBCUTANEOUS at 05:12

## 2022-01-01 RX ADMIN — SODIUM BICARBONATE: 84 INJECTION, SOLUTION INTRAVENOUS at 00:08

## 2022-01-01 RX ADMIN — Medication 100 MCG/HR: at 17:06

## 2022-01-01 RX ADMIN — SODIUM CHLORIDE, PRESERVATIVE FREE 10 ML: 5 INJECTION INTRAVENOUS at 20:30

## 2022-01-01 RX ADMIN — SODIUM BICARBONATE: 84 INJECTION, SOLUTION INTRAVENOUS at 10:26

## 2022-01-01 RX ADMIN — POTASSIUM CHLORIDE 10 MEQ: 7.46 INJECTION, SOLUTION INTRAVENOUS at 02:56

## 2022-01-01 RX ADMIN — Medication 100 MCG/HR: at 03:11

## 2022-01-01 RX ADMIN — SODIUM CHLORIDE, POTASSIUM CHLORIDE, SODIUM LACTATE AND CALCIUM CHLORIDE 1000 ML: 600; 310; 30; 20 INJECTION, SOLUTION INTRAVENOUS at 15:01

## 2022-01-01 RX ADMIN — POLYVINYL ALCOHOL 1 DROP: 14 SOLUTION/ DROPS OPHTHALMIC at 15:11

## 2022-01-01 RX ADMIN — PIPERACILLIN AND TAZOBACTAM 4500 MG: 4; .5 INJECTION, POWDER, FOR SOLUTION INTRAVENOUS at 16:52

## 2022-01-01 RX ADMIN — HYDROCORTISONE SODIUM SUCCINATE 100 MG: 100 INJECTION, POWDER, FOR SOLUTION INTRAMUSCULAR; INTRAVENOUS at 12:19

## 2022-01-01 RX ADMIN — HYOSCYAMINE SULFATE: 16 SOLUTION at 08:56

## 2022-01-01 RX ADMIN — PHENYLEPHRINE HYDROCHLORIDE 200 MCG: 10 INJECTION INTRAVENOUS at 21:46

## 2022-01-01 RX ADMIN — Medication 125 MCG/HR: at 02:43

## 2022-01-01 RX ADMIN — POLYVINYL ALCOHOL 1 DROP: 14 SOLUTION/ DROPS OPHTHALMIC at 14:35

## 2022-01-01 ASSESSMENT — PULMONARY FUNCTION TESTS
PIF_VALUE: 39
PIF_VALUE: 36
PIF_VALUE: 49
PIF_VALUE: 35
PIF_VALUE: 30
PIF_VALUE: 26
PIF_VALUE: 30
PIF_VALUE: 47
PIF_VALUE: 40
PIF_VALUE: 26
PIF_VALUE: 19
PIF_VALUE: 28
PIF_VALUE: 38
PIF_VALUE: 35
PIF_VALUE: 35
PIF_VALUE: 39
PIF_VALUE: 27
PIF_VALUE: 34
PIF_VALUE: 31
PIF_VALUE: 30
PIF_VALUE: 37
PIF_VALUE: 37
PIF_VALUE: 24
PIF_VALUE: 35
PIF_VALUE: 37
PIF_VALUE: 38
PIF_VALUE: 43
PIF_VALUE: 33
PIF_VALUE: 22
PIF_VALUE: 34
PIF_VALUE: 33
PIF_VALUE: 47
PIF_VALUE: 26
PIF_VALUE: 38
PIF_VALUE: 26
PIF_VALUE: 27
PIF_VALUE: 22
PIF_VALUE: 24
PIF_VALUE: 37
PIF_VALUE: 22
PIF_VALUE: 35
PIF_VALUE: 36
PIF_VALUE: 33
PIF_VALUE: 18
PIF_VALUE: 35
PIF_VALUE: 24
PIF_VALUE: 26
PIF_VALUE: 17
PIF_VALUE: 31
PIF_VALUE: 26
PIF_VALUE: 22
PIF_VALUE: 32
PIF_VALUE: 34
PIF_VALUE: 36
PIF_VALUE: 41
PIF_VALUE: 32
PIF_VALUE: 21
PIF_VALUE: 18
PIF_VALUE: 37
PIF_VALUE: 34
PIF_VALUE: 31
PIF_VALUE: 27
PIF_VALUE: 37
PIF_VALUE: 35
PIF_VALUE: 38
PIF_VALUE: 40
PIF_VALUE: 36
PIF_VALUE: 44
PIF_VALUE: 24
PIF_VALUE: 50
PIF_VALUE: 33
PIF_VALUE: 34
PIF_VALUE: 22
PIF_VALUE: 24
PIF_VALUE: 27
PIF_VALUE: 30
PIF_VALUE: 40
PIF_VALUE: 33
PIF_VALUE: 36
PIF_VALUE: 39
PIF_VALUE: 40
PIF_VALUE: 22
PIF_VALUE: 17
PIF_VALUE: 39
PIF_VALUE: 35
PIF_VALUE: 22
PIF_VALUE: 33
PIF_VALUE: 45
PIF_VALUE: 28
PIF_VALUE: 34
PIF_VALUE: 36
PIF_VALUE: 38
PIF_VALUE: 24
PIF_VALUE: 17
PIF_VALUE: 33
PIF_VALUE: 19
PIF_VALUE: 39
PIF_VALUE: 35
PIF_VALUE: 26
PIF_VALUE: 22
PIF_VALUE: 25
PIF_VALUE: 23
PIF_VALUE: 21
PIF_VALUE: 32
PIF_VALUE: 20
PIF_VALUE: 36
PIF_VALUE: 41
PIF_VALUE: 19
PIF_VALUE: 35
PIF_VALUE: 32
PIF_VALUE: 37
PIF_VALUE: 33
PIF_VALUE: 25
PIF_VALUE: 24
PIF_VALUE: 36
PIF_VALUE: 42
PIF_VALUE: 21
PIF_VALUE: 22
PIF_VALUE: 18
PIF_VALUE: 35
PIF_VALUE: 34
PIF_VALUE: 39
PIF_VALUE: 47
PIF_VALUE: 36
PIF_VALUE: 36
PIF_VALUE: 24
PIF_VALUE: 34
PIF_VALUE: 21
PIF_VALUE: 45
PIF_VALUE: 35
PIF_VALUE: 46
PIF_VALUE: 31
PIF_VALUE: 31
PIF_VALUE: 25
PIF_VALUE: 40
PIF_VALUE: 33
PIF_VALUE: 42
PIF_VALUE: 38
PIF_VALUE: 28
PIF_VALUE: 16
PIF_VALUE: 21
PIF_VALUE: 39
PIF_VALUE: 19
PIF_VALUE: 33
PIF_VALUE: 28
PIF_VALUE: 21
PIF_VALUE: 36
PIF_VALUE: 27
PIF_VALUE: 35
PIF_VALUE: 21
PIF_VALUE: 24
PIF_VALUE: 25
PIF_VALUE: 40
PIF_VALUE: 19
PIF_VALUE: 37
PIF_VALUE: 35
PIF_VALUE: 25
PIF_VALUE: 28
PIF_VALUE: 37
PIF_VALUE: 27
PIF_VALUE: 36
PIF_VALUE: 31
PIF_VALUE: 32
PIF_VALUE: 27
PIF_VALUE: 33
PIF_VALUE: 37
PIF_VALUE: 39
PIF_VALUE: 42
PIF_VALUE: 19
PIF_VALUE: 40
PIF_VALUE: 35
PIF_VALUE: 27
PIF_VALUE: 31
PIF_VALUE: 35
PIF_VALUE: 36
PIF_VALUE: 31
PIF_VALUE: 37
PIF_VALUE: 42
PIF_VALUE: 34
PIF_VALUE: 30
PIF_VALUE: 33
PIF_VALUE: 38
PIF_VALUE: 43
PIF_VALUE: 34
PIF_VALUE: 37
PIF_VALUE: 35
PIF_VALUE: 18
PIF_VALUE: 25
PIF_VALUE: 46
PIF_VALUE: 28
PIF_VALUE: 42
PIF_VALUE: 49

## 2022-01-01 ASSESSMENT — PAIN SCALES - GENERAL
PAINLEVEL_OUTOF10: 0
PAINLEVEL_OUTOF10: 5
PAINLEVEL_OUTOF10: 0
PAINLEVEL_OUTOF10: 6
PAINLEVEL_OUTOF10: 0
PAINLEVEL_OUTOF10: 0
PAINLEVEL_OUTOF10: 2
PAINLEVEL_OUTOF10: 0
PAINLEVEL_OUTOF10: 4
PAINLEVEL_OUTOF10: 0
PAINLEVEL_OUTOF10: 5
PAINLEVEL_OUTOF10: 0
PAINLEVEL_OUTOF10: 3
PAINLEVEL_OUTOF10: 0
PAINLEVEL_OUTOF10: 4
PAINLEVEL_OUTOF10: 7
PAINLEVEL_OUTOF10: 0
PAINLEVEL_OUTOF10: 0

## 2022-01-26 NOTE — PROGRESS NOTES
Out Patient CARDIOLOGY FOLLOW UP    Name: Kenzie Logan    Age: 66 y.o. Date of Service: 1/26/2022      Referring Physician: No admitting provider for patient encounter. Chief Complaint   Patient presents with    Hypertension     OV- Patient complains of tightness in the chest and SOB.  Cardiomyopathy    Congestive Heart Failure        History of Present Illness: 58-year-old female with history of type 2 diabetes, hypertension, asthma, fibromyalgia, iron deficiency anemia, depression, heart failure with depressed systolic function with echocardiogram in October 2019 revealing EF of 25%. She was placed on Entresto, beta-blocker and spironolactone and systolic function on echocardiogram 2021 revealed EF of 48%. She has been on low-dose Entresto and I discussed up titration of this medication today but the patient and the daughter reports when attempted to increase Eric Fitting was made last year, the patient has significant side effects to this medication and subsequently the dose of this medicine was decreased back to the lowest intensity dose. Subsequently they are not interested and are uncomfortable in increasing Entresto dose at this time. The patient also has history of coronary artery disease that has been medically managed with last cardiac catheterization in October 2019 revealing chronic total occlusion of LAD with left to left and right-to-left collaterals. She has suspected sleep apnea but has declined sleep study. Present for follow-up visit today with her daughter. During last visit she reported atypical chest discomfort and dyspnea on exertion and subsequently pharmacologic myocardial perfusion stress test was arranged. Unfortunately the patient has not undergone this test.  On today's visit she reports she is still having intermittent chest pressure with activities and worsening dyspnea on exertion.   I have added Imdur and arrange for pharmacologic myocardial perfusion stress test for further evaluation. She will continue the beta-blocker, Entresto and spironolactone. Given her baseline kidney dysfunction I am going to obtain basic metabolic panel to monitor kidney function and to see whether or not Entresto and spironolactone doses need to be adjusted based on kidney function.          History:     1. Admitted 10/20/2019 via ED for dyspnea and fatigue   present for 24/36 hours   ED /119.  Sinus tachycardia 114.  Chest CT \"groundglass\".  ProBNP 2832.  Troponin normal.  D-dimer 3927.  Prolactin 0.9.  2. HTN  3. T2DM   4. Asthma  5. Fibromyalgia. 6. Iron deficiency anemia (prn iron infusions). 7. Depression. 8. Echo  LVEF = 25%.  10/20/2019  9. Cardiac catheterization 10/23/2019 showed no hemodynamically significant coronary artery disease. 10. Wearable cardio defibrillator applied 10/24/2019. 11. Outpatient cardiac follow-up, 11/07/2019. Coreg increased to 12.5 twice daily. 12. Outpatient cardiac follow-up, 11/20/2019. Coreg increased to 25 mg twice daily. 13. Labs, 12/12/2019. BUN= 37.  Creatinine= 1.3.  K=4.0.  proBNP= 1159. 14. Echo, 02/05/2020. RADHA.  Biplane EF 59%.  Apical hypokinesis.  LifeVest removed  15. Labs, 02/06/2020.  ProBNP= 802,  K= 5.5 with normal  BUN and creatinine.    16. Outpatient cardiac follow-up, 02/06/2020. EF biplane 59%. .  Defibrillator discontinued.    17. Outpatient cardiac assessment, 02/25/2020. Still feels dyspneic.  BMP and proBNP checked. Dai Monica 24/49 started.  Initiated after 48-hour washout of lisinopril. 18. Outpatient cardiac follow-up 03/10/2020: Minimal dyspnea.  K4. 6.  BUN 26.  Creatinine 1.0.  proBNP 802 (02/06/2020). 19. Phone call 04/21/2020: Asymptomatic.  No medication changes  20. Outpatient cardiac assessment 07/21/2020: No medication change  21. Worsening hypertension.  Norvasc started by Dr. Mikie Perry 09/30/2020  22. Labs 09/29/2020: H/H 11/35.  BUN 18.  Creatinine 1. 21.  K4.0.  23. Chest CT, 10/11/2020.  Lungs are clear.  Heart not dilated.  Coronary artery calcification present. Aortic valve calcified.  No pericardial effusion.  Thoracic aorta not dilated.     24. Labs, 11/05/2020. BUN= 24, creatinine =1.3, proBNP= 373 (3 months ago 748 and 9 months ago ==802). 25. Echocardiogram limited, 01/20/2021. LVEF 45%.  Apical hypokinesis.  No dilatation. 26. Labs 05/14/2021: BUN 32.   Creatinine 1.9.  K4.9     Review of Systems:   Cardiac: As per HPI  General: Denies fever or chills  Pulmonary: As per HPI  HEENT: Denies runny nose  GI: No complaints  : No complaints  Endocrine: Denies night sweats  Musculoskeletal: No complaints  Skin: Dry skin  Neuro: No complaints  Psych: Denies depression    Past Medical History:  Past Medical History:   Diagnosis Date    Asthma     [facility])     Diabetes mellitus ([facility] [address]     Diverticular disease     Fibromyalgia muscle pain     Glaucoma     Hypertension        Past Surgical History:  Past Surgical History:   Procedure Laterality Date    HYSTERECTOMY         Family History:  Family History   Problem Relation Age of Onset    Kidney Disease Mother     Heart Failure Mother     Other Father         aneurysm    Other Sister         scleroderma    Cancer Brother         throat       Social History:  Social History     Socioeconomic History    Marital status:      Spouse name: Not on file    Number of children: Not on file    Years of education: Not on file    Highest education level: Not on file   Occupational History    Not on file   Tobacco Use    Smoking status: Never Smoker    Smokeless tobacco: Never Used   Vaping Use    Vaping Use: Never used   Substance and Sexual Activity    Alcohol use: No    Drug use: No    Sexual activity: Not Currently   Other Topics Concern    Not on file   Social History Narrative    Not on file     Social Determinants of Health     Financial Resource Strain:     Difficulty of Paying Living Expenses: Not on file   Food Insecurity:     Worried About Running Out of Food in the Last Year: Not on file    Luisito of Food in the Last Year: Not on file   Transportation Needs:     Lack of Transportation (Medical): Not on file    Lack of Transportation (Non-Medical): Not on file   Physical Activity:     Days of Exercise per Week: Not on file    Minutes of Exercise per Session: Not on file   Stress:     Feeling of Stress : Not on file   Social Connections:     Frequency of Communication with Friends and Family: Not on file    Frequency of Social Gatherings with Friends and Family: Not on file    Attends Hoahaoism Services: Not on file    Active Member of [address] [place] [address] [employer] or Organizations: Not on file    Attends Club or Organization Meetings: Not on file    Marital Status: Not on file   Intimate Partner Violence:     Fear of Current or Ex-Partner: Not on file    Emotionally Abused: Not on file    Physically Abused: Not on file    Sexually Abused: Not on file   Housing Stability:     Unable to Pay for Housing in the Last Year: Not on file    Number of [place] in the Last Year: Not on file    Unstable Housing in the Last Year: Not on file       Allergies:  No Known Allergies    Home Medications:  Prior to Admission medications    Medication Sig Start Date End Date Taking?  Authorizing Provider   carvedilol (COREG) 25 MG tablet Take 1 tablet by mouth 2 times daily (with meals) 8/23/21  Yes Chelsie Akbar MD   rosuvastatin (CRESTOR) 20 MG tablet Take 1 tablet by mouth daily 7/27/21  Yes Allie Duncan MD   sacubitril-valsartan (ENTRESTO) 24-26 MG per tablet Take 1 tablet by mouth 2 times daily 3/5/21  Yes Audie Davis MD   aspirin EC 81 MG EC tablet Take 1 tablet by mouth daily 1/28/21  Yes Feliciano Valdez MD   spironolactone (ALDACTONE) 25 MG tablet Take 0.5 tablets by mouth daily 10/1/20  Yes Audie Davis MD   ferrous sulfate 325 (65 Fe) MG tablet Take 325 mg by mouth daily (with breakfast)   Yes Historical Provider, Resp 18   Ht 5' 3\" (1.6 m)   Wt 195 lb (88.5 kg)   SpO2 97%   BMI 34.54 kg/m²   Wt Readings from Last 3 Encounters:   01/26/22 195 lb (88.5 kg)   11/03/21 197 lb 14.4 oz (89.8 kg)   06/08/21 192 lb 14.4 oz (87.5 kg)       Appearance: Alert and oriented x3 not in acute distress. Skin: Dry skin  Head: Atraumatic  Eyes: Intact extraocular muscles   ENMT: Mucous membranes are moist  Neck: Supple  Lungs: Clear to auscultation  Cardiac: Normal S1 and S2  Abdomen: Protuberant  Extremities: Intact range of motion  Neurologic: No focal neurological deficits  Peripheral Pulses: 2+ peripheral pulses    Intake/Output:  No intake or output data in the 24 hours ending 01/26/22 1406  [unfilled]    Laboratory Tests:  No results for input(s): NA, K, CL, CO2, BUN, CREATININE, GLUCOSE, CALCIUM in the last 72 hours. Lab Results   Component Value Date    MG 2.1 10/22/2019    MG 2.2 05/10/2016     No results for input(s): ALKPHOS, ALT, AST, PROT, BILITOT, BILIDIR, LABALBU in the last 72 hours. No results for input(s): WBC, RBC, HGB, HCT, MCV, MCH, MCHC, RDW, PLT, MPV in the last 72 hours. Lab Results   Component Value Date    TROPONINI 0.05 (H) 10/20/2019    TROPONINI 0.05 (H) 10/20/2019    TROPONINI <0.01 10/20/2019     No results for input(s): CKTOTAL, CKMB, CKMBINDEX, TROPHS in the last 72 hours.   Lab Results   Component Value Date    INR 1.2 10/24/2019    INR 1.1 10/23/2019    PROTIME 14.1 (H) 10/24/2019    PROTIME 13.3 (H) 10/23/2019     Lab Results   Component Value Date    TSH 0.260 (L) 10/21/2019    TSH 0.988 11/16/2010     Lab Results   Component Value Date    LABA1C 9.7 (H) 10/21/2019    LABA1C 9.7 (H) 09/20/2019    LABA1C 10.4 (H) 11/16/2010     No results found for: EAG  No results found for: CHOL  No results found for: TRIG  No results found for: HDL  No results found for: LDLCALC, LDLCHOLESTEROL  No results found for: LABVLDL, VLDL  No results found for: CHOLHDLRATIO  No results for input(s): PROBNP in the last 72 hours. Cardiac Tests:  EKG reviewed (EKG date: Sinus rhythm, 63 bpm, occasional PVCs, anteroseptal Q waves, lateral Q waves and nonspecific ST-T wave changes.):      Echocardiogram reviewed: 8/25/21   Moderate to severe proximal septal wall thickness (1.5 to 1.6cm). Consider   cardiac MRI for further evaluation if none has been done recently. Akinetic apex   Ejection fraction is visually estimated at 48% by Albarran's method. Mild RVH   Normal right ventricular size and function. Mild tricuspid regurgitation. Stress test reviewed:  10/22/2019    Impression   1. Large fixed perfusion defect centered at the apex. Small area of   reversible ischemia suggested at the apical lateral wall. 2. Ejection fraction is 33 %. 3. Extensive wall motion abnormality as outlined above. Cardiac catheterization reviewed: 11/2019     ANGIOGRAPHIC RESULTS:  Left main:  No angiographically significant stenosis.     LAD:  Proximal 100% chronic total occlusion with left-to-left and  right-to-left collaterals.     Circumflex:  Mild diffuse luminal irregularities.     OM1:  No angiographically significant stenosis.     OM2:  No angiographically significant stenosis.     Ramus:  Bifurcating vessel. The anterior bifurcation has a mid diffuse  40% stenosis. The anterior bifurcation has no significant stenosis.     Right coronary artery:  Dominant vessel. Minimal diffuse luminal  irregularities. Collaterals supplying the mid, distal, and apical LAD.     LEFT VENTRICULOGRAM:  Mid, distal anterior wall is akinetic. The apical  and inferior apical walls are dyskinetic. Overall, ejection fraction  30%. CXR reviewed: The ASCVD Risk score (Archana Decker et al., 2013) failed to calculate for the following reasons:    Cannot find a previous HDL lab    Cannot find a previous total cholesterol lab    ASSESSMENT / PLAN:    1.  Coronary artery disease involving native coronary artery of native heart without angina pectoris  - Cardiac Stress Test - w/Pharm; Future  She has total occlusion of LAD which was found on a cardiac catheter in October 2019 but was noted to have left to left and right-to-left collaterals and subsequently was medically managed. Since he is having symptoms I am arranging for pharmacologic myocardial perfusion stress test  I have added Imdur and she is already on beta-blocker, aspirin and statin therapy    2. SPEARS (dyspnea on exertion)/Chest Pressure  As mentioned above  3. Other fatigue  - Cardiac Stress Test - w/Pharm; Future    5. Abnormal EKG  - Cardiac Stress Test - w/Pharm; Future    6. Abnormal echocardiogram  - Cardiac Stress Test - w/Pharm; Future  Recent echocardiogram revealed wall motion abnormalities at the apex  7. Chronic systolic congestive heart failure (HCC)  Denies orthopnea or paroxysmal nocturnal dyspnea  EF on echocardiogram in August 2021 revealed 48%  In 2019 EF was 25%. Going to obtain basic metabolic panel to guide therapy to see whether or not spironolactone and Entresto dose need to be adjusted depending on kidney function status. He has been reluctant in up titration of Entresto due to previous side effects. I have added Imdur and during next visit we will discuss potential addition of hydralazine for further optimization of guideline directed medical therapy. If kidney function does not allow continuation of spironolactone or Entresto then we will uptitrate Imdur and add hydralazine. 8. LVH (left ventricular hypertrophy)  Recent echocardiogram revealed moderate concentric left ventricular hypertrophy  This is likely due to chronic hypertension  We will however monitor and if ischemic evaluation is unrevealing and patient continues to have symptoms we will discuss possibility of cardiac MRI at that time    9. Dilated cardiomyopathy ([facility] [address]  - NM Cardiac Stress Test Nuclear Imaging; Future    10. Essential hypertension  Stable    11.  Uncontrolled type 2 diabetes mellitus with hypoglycemia without coma ([facility] [address]  Follow-up with your PCP  12. Gastrointestinal hemorrhage associated with gastric ulcer  She has prior GI bleed but denies recurrent  13. Chronic low back pain with sciatica, sciatica laterality unspecified, unspecified back pain laterality             FOLLOW-UP 3 months    Thank you for allowing me to participate in your patient's care. Please feel free to contact me if you have any questions or concerns.     Jimmy Heredia MD  [facility]) Cardiology

## 2022-02-10 NOTE — TELEPHONE ENCOUNTER
PATIENTS DAUGHTER CALLED TO SCHEDULE STRESS TEST FOR 02-24-22. REVIEWED COVID CHECKLIST WITH PATIENT.     Electronically signed by Analisa Bowman on 2/10/2022 at 11:11 AM

## 2022-02-23 NOTE — TELEPHONE ENCOUNTER
CALLED PATIENT TO CANCEL STRESS TEST FOR TOMORROW DUE TO TREADMILL/COMPUTER FAILURE. I OFFERED A TIME TOMORROW IN [facility]. PT WOULD LIKE TO HAVE TEST DONE HERE AT [place].   RESCHEDULED FOR 03-17-22    Electronically signed by Shakila Pan on 2/23/2022 at 10:22 AM

## 2022-03-15 NOTE — TELEPHONE ENCOUNTER
Spoke with patient, reminded of Chemical Nuclear Stress test on 3-17 [address] Cardiology @ 7:00 am . Reviewed Covid Pre Procedure Checklist

## 2022-03-17 NOTE — PROCEDURES
soft tissue breast attenuation. There is a large sized, severe intensity fixed apical perfusion defect. The resting images show no change. Gated SPECT left ventricular ejection fraction was calculated to be 64%, with akinesis of the apical wall. Impression:    1. ECG during the infusion did not change. 2. The myocardial perfusion imaging was abnormal.    The abnormality was a a large sized fixed defect in the apical wall suggestive of a prior MI  3. Overall left ventricular systolic function was normal with wall motion abnormality. 4. Low risk general pharmacologic stress test.    Thank you for sending your patient to this [employer].      Electronically signed by Kelsie Smith DO on 3/17/22 at 12:30 PM EDT

## 2022-03-18 NOTE — TELEPHONE ENCOUNTER
Left message for patient to contact office. ----- Message from uRben Magana MD sent at 3/18/2022  1:41 PM EDT -----  Status shows prior heart attack. Please arrange for invasive coronary angiogram if patient is still having symptoms. If patient has no symptoms please arrange to have patient follow-up with me.   If symptoms are significant to go to the emergency room

## 2022-04-27 NOTE — PROGRESS NOTES
Out Patient CARDIOLOGY FOLLOW UP    Name: Tatianna Barillas    Age: 74 y.o. Date of Service: 4/27/2022      Referring Physician: No admitting provider for patient encounter. Chief Complaint   Patient presents with    Coronary Artery Disease    Shortness of Breath    Edema        History of Present Illness: 77-year-old female with history of type 2 diabetes, hypertension, asthma, fibromyalgia, iron deficiency anemia, depression, heart failure with depressed systolic function with echocardiogram in October 2019 revealing EF of 25%. She was placed on Entresto, beta-blocker and spironolactone and systolic function on echocardiogram 2021 revealed EF of 48%. The patient also has history of coronary artery disease that has been medically managed with last cardiac catheterization in October 2019 revealing chronic total occlusion of LAD with left to left and right-to-left collaterals. She has suspected sleep apnea but has declined sleep study. He underwent an ischemic evaluation recently that revealed scar and subsequently arranging for cardiac viability study to guide therapy. Also we do not have updated blood work and I am going to obtain routine blood work to guide therapy.            History:     1. Admitted 10/20/2019 via ED for dyspnea and fatigue   present for 24/36 hours   ED /119.  Sinus tachycardia 114.  Chest CT \"groundglass\".  ProBNP 2832.  Troponin normal.  D-dimer 2320.  Prolactin 0.9.  2. HTN  3. T2DM   4. Asthma  5. Fibromyalgia. 6. Iron deficiency anemia (prn iron infusions). 7. Depression. 8. Echo  LVEF = 25%.  10/20/2019  9. Cardiac catheterization 10/23/2019 showed no hemodynamically significant coronary artery disease. 10. Wearable cardio defibrillator applied 10/24/2019. 11. Outpatient cardiac follow-up, 11/07/2019. Coreg increased to 12.5 twice daily. 12. Outpatient cardiac follow-up, 11/20/2019. Coreg increased to 25 mg twice daily. 13. Labs, 12/12/2019. BUN= 37.  Creatinine= 1.3.  K=4.0.  proBNP= 1159. 14. Echo, 02/05/2020. RADHA.  Biplane EF 59%.  Apical hypokinesis.  LifeVest removed  15. Labs, 02/06/2020.  ProBNP= 802,  K= 5.5 with normal  BUN and creatinine.    16. Outpatient cardiac follow-up, 02/06/2020. EF biplane 59%. .  Defibrillator discontinued.    17. Outpatient cardiac assessment, 02/25/2020. Still feels dyspneic.  BMP and proBNP checked. [place] Evens 24/49 started.  Initiated after 48-hour washout of lisinopril. 18. Outpatient cardiac follow-up 03/10/2020: Minimal dyspnea.  K4. 6.  BUN 26.  Creatinine 1.0.  proBNP 802 (02/06/2020). 19. Phone call 04/21/2020: Asymptomatic.  No medication changes  20. Outpatient cardiac assessment 07/21/2020: No medication change  21. Worsening hypertension.  Norvasc started by Dr. Syed Paul 09/30/2020  22. Labs 09/29/2020: H/H 11/35.  BUN 18.  Creatinine 1. 21.  K4.0.  23. Chest CT, 10/11/2020. Lungs are clear.  Heart not dilated.  Coronary artery calcification present. Aortic valve calcified.  No pericardial effusion.  Thoracic aorta not dilated.     24. Labs, 11/05/2020. BUN= 24, creatinine =1.3, proBNP= 373 (3 months ago 748 and 9 months ago ==802). 25. Echocardiogram limited, 01/20/2021. LVEF 45%.  Apical hypokinesis.  No dilatation. 26. Labs 05/14/2021: BUN 32.   Creatinine 1.9.  K4.9     Review of Systems:   Cardiac: As per HPI  General: Denies fever or chills  Pulmonary: As per HPI  HEENT: Denies runny nose  GI: No complaints  : No complaints  Endocrine: Denies night sweats  Musculoskeletal: No complaints  Skin: Dry skin  Neuro: No complaints  Psych: Denies depression    Past Medical History:  Past Medical History:   Diagnosis Date    Asthma     CHF (congestive heart failure) (HCC)     Diabetes mellitus ([address]     Diverticular disease     Fibromyalgia muscle pain     Glaucoma     Hypertension     Neuropathy        Past Surgical History:  Past Surgical History:   Procedure Laterality Date    HYSTERECTOMY Family History:  Family History   Problem Relation Age of Onset    Kidney Disease Mother     Heart Failure Mother     Other Father         aneurysm    Other Sister         scleroderma    Cancer Brother         throat       Social History:  Social History     Socioeconomic History    Marital status:      Spouse name: Not on file    Number of children: Not on file    Years of education: Not on file    Highest education level: Not on file   Occupational History    Not on file   Tobacco Use    Smoking status: Never Smoker    Smokeless tobacco: Never Used   Vaping Use    Vaping Use: Never used   Substance and Sexual Activity    Alcohol use: No    Drug use: No    Sexual activity: Not Currently   Other Topics Concern    Not on file   Social History Narrative    Not on file     Social Determinants of Health     Financial Resource Strain:     Difficulty of Paying Living Expenses: Not on file   Food Insecurity:     Worried About [address] in the Last Year: Not on file    [address] [employer] in the Last Year: Not on file   Transportation Needs:     Lack of Transportation (Medical): Not on file    Lack of Transportation (Non-Medical):  Not on file   Physical Activity:     Days of Exercise per Week: Not on file    Minutes of Exercise per Session: Not on file   Stress:     Feeling of Stress : Not on file   Social Connections:     Frequency of Communication with Friends and Family: Not on file    Frequency of Social Gatherings with Friends and Family: Not on file    Attends Christianity Services: Not on file    Active Member of Clubs or Organizations: Not on file    Attends Club or Organization Meetings: Not on file    Marital Status: Not on file   Intimate Partner Violence:     Fear of Current or Ex-Partner: Not on file    Emotionally Abused: Not on file    Physically Abused: Not on file    Sexually Abused: Not on file   Housing Stability:     Unable to Pay for Housing in the Last Year: Not on file    Number of Places Lived in the Last Year: Not on file    Unstable Housing in the Last Year: Not on file       Allergies:  No Known Allergies    Home Medications:  Prior to Admission medications    Medication Sig Start Date End Date Taking? Authorizing Provider   spironolactone (ALDACTONE) 25 MG tablet Take 0.5 tablets by mouth daily 4/6/22  Yes Kelin Akbar MD   VITAMIN D PO Take 1 tablet by mouth daily Patient takes over the counter gummie   Yes Historical Provider, MD   Cyanocobalamin (VITAMIN B12 PO) Take 1 tablet by mouth daily Patient takes over the counter gummie   Yes Historical Provider, MD   carvedilol (COREG) 25 MG tablet Take 1 tablet by mouth 2 times daily (with meals) 3/7/22  Yes Kelin Akbar MD   sacubitril-valsartan (ENTRESTO) 24-26 MG per tablet Take 1 tablet by mouth 2 times daily 3/7/22  Yes Kelin Akbar MD   aspirin EC 81 MG EC tablet Take 1 tablet by mouth daily 2/10/22  Yes Dorothy Tovar MD   rosuvastatin (CRESTOR) 20 MG tablet Take 1 tablet by mouth daily 2/10/22  Yes Dorothy Tovar MD   isosorbide mononitrate (IMDUR) 30 MG extended release tablet Take 1 tablet by mouth daily 1/26/22  Yes Kelin Akbar MD   ferrous sulfate 325 (65 Fe) MG tablet Take 325 mg by mouth daily (with breakfast)   Yes Historical Provider, MD   cyclobenzaprine (FLEXERIL) 10 MG tablet Take 10 mg by mouth 3 times daily as needed for Muscle spasms    Yes Historical Provider, MD   vitamin C (ASCORBIC ACID) 500 MG tablet Take 500 mg by mouth daily Patient takes 2 gummies daily   Yes Historical Provider, MD   FLUoxetine (PROZAC) 20 MG capsule Take 20 mg by mouth daily   Yes Historical Provider, MD   insulin glargine (LANTUS) 100 UNIT/ML injection vial Inject 30 Units into the skin nightly    Yes Historical Provider, MD   oxyCODONE-acetaminophen (PERCOCET) 7.5-325 MG per tablet Take 1 tablet by mouth every 6 hours as needed for Pain.     Yes Historical Provider, MD Current Medications:  Current Outpatient Medications   Medication Sig Dispense Refill    spironolactone (ALDACTONE) 25 MG tablet Take 0.5 tablets by mouth daily 30 tablet 5    VITAMIN D PO Take 1 tablet by mouth daily Patient takes over the counter gummie      Cyanocobalamin (VITAMIN B12 PO) Take 1 tablet by mouth daily Patient takes over the counter gummie      carvedilol (COREG) 25 MG tablet Take 1 tablet by mouth 2 times daily (with meals) 60 tablet 5    sacubitril-valsartan (ENTRESTO) 24-26 MG per tablet Take 1 tablet by mouth 2 times daily 60 tablet 5    aspirin EC 81 MG EC tablet Take 1 tablet by mouth daily 90 tablet 3    rosuvastatin (CRESTOR) 20 MG tablet Take 1 tablet by mouth daily 90 tablet 3    isosorbide mononitrate (IMDUR) 30 MG extended release tablet Take 1 tablet by mouth daily 30 tablet 3    ferrous sulfate 325 (65 Fe) MG tablet Take 325 mg by mouth daily (with breakfast)      cyclobenzaprine (FLEXERIL) 10 MG tablet Take 10 mg by mouth 3 times daily as needed for Muscle spasms       vitamin C (ASCORBIC ACID) 500 MG tablet Take 500 mg by mouth daily Patient takes 2 gummies daily      FLUoxetine (PROZAC) 20 MG capsule Take 20 mg by mouth daily      insulin glargine (LANTUS) 100 UNIT/ML injection vial Inject 30 Units into the skin nightly       oxyCODONE-acetaminophen (PERCOCET) 7.5-325 MG per tablet Take 1 tablet by mouth every 6 hours as needed for Pain.         Current Facility-Administered Medications   Medication Dose Route Frequency Provider Last Rate Last Admin    regadenoson (LEXISCAN) injection 0.4 mg  0.4 mg IntraVENous ONCE PRN Tre Akbar MD        regadenoson (LEXISCAN) injection 0.4 mg  0.4 mg IntraVENous ONCE PRN Tre Akbar MD             Physical Exam:  /73   Pulse 75   Resp 18   Ht 5' 2\" (1.575 m)   Wt 208 lb 8 oz (94.6 kg)   SpO2 97%   BMI 38.14 kg/m²   Wt Readings from Last 3 Encounters:   04/27/22 208 lb 8 oz (94.6 kg)   03/17/22 187 lb (84.8 kg)   01/26/22 195 lb (88.5 kg)       Appearance: Alert and oriented x3 not in acute distress. Skin: Dry skin  Head: Atraumatic  Eyes: Intact extraocular muscles   ENMT: Mucous membranes are moist  Neck: Supple  Lungs: Clear to auscultation  Cardiac: Normal S1 and S2  Abdomen: Protuberant  Extremities: Intact range of motion  Neurologic: No focal neurological deficits  Peripheral Pulses: 2+ peripheral pulses    Intake/Output:  No intake or output data in the 24 hours ending 04/27/22 1528  @[facility]@    Laboratory Tests:  No results for input(s): NA, K, CL, CO2, BUN, CREATININE, GLUCOSE, CALCIUM in the last 72 hours. Lab Results   Component Value Date    MG 2.1 10/22/2019    MG 2.2 05/10/2016     No results for input(s): ALKPHOS, ALT, AST, PROT, BILITOT, BILIDIR, LABALBU in the last 72 hours. No results for input(s): WBC, RBC, HGB, HCT, MCV, MCH, MCHC, RDW, PLT, MPV in the last 72 hours. Lab Results   Component Value Date    TROPONINI 0.05 (H) 10/20/2019    TROPONINI 0.05 (H) 10/20/2019    TROPONINI <0.01 10/20/2019     No results for input(s): CKTOTAL, CKMB, CKMBINDEX, TROPHS in the last 72 hours. Lab Results   Component Value Date    INR 1.2 10/24/2019    INR 1.1 10/23/2019    PROTIME 14.1 (H) 10/24/2019    PROTIME 13.3 (H) 10/23/2019     Lab Results   Component Value Date    TSH 0.260 (L) 10/21/2019    TSH 0.988 11/16/2010     Lab Results   Component Value Date    LABA1C 9.7 (H) 10/21/2019    LABA1C 9.7 (H) 09/20/2019    LABA1C 10.4 (H) 11/16/2010     No results found for: EAG  No results found for: CHOL  No results found for: TRIG  No results found for: HDL  No results found for: LDLCALC, LDLCHOLESTEROL  No results found for: LABVLDL, VLDL  No results found for: CHOLHDLRATIO  No results for input(s): PROBNP in the last 72 hours.     Cardiac Tests:  EKG reviewed (EKG date: Sinus rhythm, 75 bpm, anterior septal Q waves, poor wave progression and nonspecific ST-T wave changes):      Echocardiogram reviewed: 8/25/21   Moderate to severe proximal septal wall thickness (1.5 to 1.6cm). Consider   cardiac MRI for further evaluation if none has been done recently. Akinetic apex   Ejection fraction is visually estimated at 48% by Albarran's method. Moderate to severe proximal septal wall thickness (1.5 to 1.6cm). Consider   cardiac MRI for further evaluation if none has been done recently. Akinetic apex   Ejection fraction is visually estimated at 48% by Albarran's method. Mild RVH   Normal right ventricular size and function. Mild tricuspid regurgitation. Stress test 3/17/2022  Impression:    1. ECG during the infusion did not change. 2. The myocardial perfusion imaging was abnormal.    The abnormality was a a large sized fixed defect in the apical wall suggestive of a prior MI  3. Overall left ventricular systolic function was normal with wall motion abnormality. 4. Low risk general pharmacologic stress test.  Saida sosa for sending your patient to this [employer]. Stress test reviewed:  10/22/2019    Impression   1. Large fixed perfusion defect centered at the apex. Small area of   reversible ischemia suggested at the apical lateral wall. 2. Ejection fraction is 33 %. 3. Extensive wall motion abnormality as outlined above. Cardiac catheterization reviewed: 11/2019     ANGIOGRAPHIC RESULTS:  Left main:  No angiographically significant stenosis.     LAD:  Proximal 100% chronic total occlusion with left-to-left and  right-to-left collaterals.     Circumflex:  Mild diffuse luminal irregularities.     OM1:  No angiographically significant stenosis.     OM2:  No angiographically significant stenosis.     Ramus:  Bifurcating vessel. The anterior bifurcation has a mid diffuse  40% stenosis. The anterior bifurcation has no significant stenosis.     Right coronary artery:  Dominant vessel. Minimal diffuse luminal  irregularities. Collaterals supplying the mid, distal, and apical LAD.     LEFT VENTRICULOGRAM:  Mid, distal anterior wall is akinetic. The apical  and inferior apical walls are dyskinetic. Overall, ejection fraction  30%. CXR reviewed: The ASCVD Risk score (Anitra Samayoa, et al., 2013) failed to calculate for the following reasons:    Cannot find a previous HDL lab    Cannot find a previous total cholesterol lab    ASSESSMENT / PLAN:    1. Coronary artery disease involving native coronary artery of native heart without angina pectoris  - Cardiac Stress Test - w/Pharm; Future  She has total occlusion of LAD which was found on a cardiac catheter in October 2019 but was noted to have left to left and right-to-left collaterals and subsequently was medically managed. Recent ischemic evaluation reveals and thallium viability study is arranged  Continue current medical therapy      2. SPEARS (dyspnea on exertion)/Chest Pressure  Reports symptoms stable symptoms  Management as mentioned above  3. Other fatigue  Reports stable symptoms and awaiting on viability study    5. Abnormal EKG  No acute changes    6. Abnormal echocardiogram  - Cardiac Stress Test - w/Pharm; Future  Recent echocardiogram revealed wall motion abnormalities at the apex  7. Chronic systolic congestive heart failure (HCC)  Denies orthopnea or paroxysmal nocturnal dyspnea  EF on echocardiogram in August 2021 revealed 48%  In 2019 EF was 25%. Continue Entresto, beta-blocker and spironolactone  Going to obtain routine blood work to guide therapy    8. LVH (left ventricular hypertrophy)  Recent echocardiogram revealed moderate concentric left ventricular hypertrophy  This is likely due to chronic hypertension  We will monitor closely    9. Dilated cardiomyopathy ([address]  As mentioned above    10. Essential hypertension  Stable    11. Uncontrolled type 2 diabetes mellitus with hypoglycemia without coma ([address]  Follow-up with your PCP  12.  Gastrointestinal hemorrhage associated with gastric ulcer  She has prior GI bleed but denies recurrent  13. Chronic low back pain with sciatica, sciatica laterality unspecified, unspecified back pain laterality  Follow-up with your PCP          FOLLOW-UP 3 months    Thank you for allowing me to participate in your patient's care. Please feel free to contact me if you have any questions or concerns.     Ruben Magana MD  [facility]) Cardiology

## 2022-07-20 NOTE — ED NOTES
[facility] - [facility] EPI [address] [facility], RN  07/20/22 [id number]

## 2022-07-20 NOTE — ED NOTES
[facility] - [facility] E[facility] [address] [facility], RN  07/20/22 [id number]

## 2022-07-20 NOTE — ED NOTES
AGONAL RESPIRATIONS AT THIS TIME. RESPIRATORY BAGGING AT THIS TIME.       Maria M Almaguer RN  07/20/22 0106

## 2022-07-20 NOTE — ED NOTES
[facility] - [facility] EPI [address] [facility], RN  07/20/22 9304 No

## 2022-07-20 NOTE — ED NOTES
POA DOES NOT WANT INTUBATION AT THIS TIME. POA AT BEDSIDE WITH PATIENT. POA WANTS MEDICATIONS AND CHEST COMPRESSION BUT NO INTUBATION.      Fortino Chow RN  07/20/22 [address] Arturo Lara RN  07/20/22 [id number]

## 2022-07-21 PROBLEM — N49.3 FOURNIER'S DISEASE: Status: ACTIVE | Noted: 2022-01-01

## 2022-07-21 PROBLEM — R79.89 ELEVATED LFTS: Status: ACTIVE | Noted: 2022-01-01

## 2022-07-21 PROBLEM — A41.9 SEPTIC SHOCK (HCC): Status: ACTIVE | Noted: 2022-01-01

## 2022-07-21 PROBLEM — R65.21 SEPTIC SHOCK (HCC): Status: ACTIVE | Noted: 2022-01-01

## 2022-07-21 PROBLEM — N17.9 AKI (ACUTE KIDNEY INJURY) (HCC): Status: ACTIVE | Noted: 2022-01-01

## 2022-07-21 PROBLEM — N76.82 FOURNIER'S GANGRENE IN FEMALE: Status: ACTIVE | Noted: 2022-01-01

## 2022-07-21 NOTE — PROCEDURES
Andrew Knox is a 74 y.o. female patient. 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 Septic shock ([address]    3. Lactic acidosis    4. Acute renal failure, unspecified acute renal failure type ([address]    5. Acute on chronic combined systolic and diastolic CHF (congestive heart failure) ([address]    6. Diabetic ketoacidosis without coma associated with type 2 diabetes mellitus ([address]    7. Necrotizing fasciitis ([address]      Past Medical History:   Diagnosis Date    Asthma     CHF (congestive heart failure) (HCC)     Diabetes mellitus ([address]     Diverticular disease     Fibromyalgia muscle pain     Glaucoma     Hypertension     Neuropathy      Blood pressure (!) 134/47, pulse 79, temperature 98.1 °F (36.7 °C), temperature source Axillary, resp. rate 28, height 5' 4\" (1.626 m), weight 217 lb (98.4 kg), SpO2 100 %. Central Line    Date/Time: 7/21/2022 3:53 AM  Performed by: Matthew Bains MD  Authorized by: Matthew Bains MD   Consent: The procedure was performed in an emergent situation.   Patient identity confirmed: arm band  Indications: vascular access  Preparation: skin prepped with 2% chlorhexidine  Skin prep agent dried: skin prep agent completely dried prior to procedure  Sterile barriers: all five maximum sterile barriers used - cap, mask, sterile gown, sterile gloves, and large sterile sheet  Hand hygiene: hand hygiene performed prior to central venous catheter insertion  Location details: right subclavian  Patient position: Trendelenburg  Catheter type: triple lumen  Catheter size: 8.5 Fr  Pre-procedure: landmarks identified  Ultrasound guidance: no  Number of attempts: 1  Successful placement: yes  Post-procedure: line sutured and dressing applied  Assessment: blood return through all ports, free fluid flow, no pneumothorax on x-ray and placement verified by x-ray  Patient tolerance: patient tolerated the procedure well with no immediate complications  Comments: Dr. Alexis Philip was immediately available during the entire procedure.         Amandeep Figueroa MD  7/21/2022

## 2022-07-21 NOTE — PROGRESS NOTES
Pharmacy Consultation Note  (Antibiotic Dosing and Monitoring)    Initial consult date: 7/21/22  Consulting physician/provider: Dr Nelly Aase Binnie Leaf  Drug: Vancomycin  Indication: NSTI    Age/  Gender Height Weight IBW  Allergy Information   72 y.o./female 5' 4\" (162.6 cm) 208 lb (94.3 kg)     Ideal body weight: 54.7 kg (120 lb 9.5 oz)  Adjusted ideal body weight: 72.2 kg (159 lb 2.5 oz)   Patient has no known allergies. Renal Function:  Recent Labs     07/20/22  1945 07/21/22  0240 07/21/22  0615   BUN 68* 67* 67*   CREATININE 3.7* 3.6* 3.4*       Intake/Output Summary (Last 24 hours) at 7/21/2022 0734  Last data filed at 7/21/2022 0655  Gross per 24 hour   Intake 4318.27 ml   Output 565 ml   Net 3753.27 ml       Vancomycin Monitoring:  Trough:  No results for input(s): VANCOTROUGH in the last 72 hours. Random:  No results for input(s): VANCORANDOM in the last 72 hours. Vancomycin Administration Times:  Recent vancomycin administrations                     vancomycin (VANCOCIN) 2,000 mg in dextrose 5 % 500 mL IVPB (mg) 2,000 mg New Bag 07/20/22 8437                    Assessment:  Patient is a 74 y.o. female who has been initiated on vancomycin  Estimated Creatinine Clearance: 17 mL/min (A) (based on SCr of 3.4 mg/dL (H)).   To dose vancomycin, pharmacy will be utilizing dosing based off of levels because of patient's renal impairment/insufficiency    Plan:  Pt received Vancomycin 2000 mg IV x1 dose overnight  Will check vancomycin random level ~24 hrs post dose at midnight tonight  Will continue to monitor renal function   Clinical pharmacy to follow    Karrie StillD, [facility], [facility]  7/21/2022  7:42 AM

## 2022-07-21 NOTE — PROGRESS NOTES
[facility]  SURGICAL INTENSIVE CARE UNIT    CRITICAL CARE ATTENDING PROGRESS NOTE    I have examined the patient, reviewed the record, and discussed the case with the APN/  Resident. I have reviewed all relevant labs and imaging data. Please refer to the  APN/ resident's note. I agree with the  assessment and plan with the following corrections/ additions. The following summarizes my clinical findings and independent assessment. CC: Septic shock after NSTI    HOSPITAL COURSE:  7/21 excisional debridement of perineum and right buttock by Dr. Gigi Wang. 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 *       PLAN:  Sedation/ Pain: Continue Precedex and fentanyl drip    CV: Septic shock-weaned from 3 pressors to 2 pressors. Remains on Levophed at 15 mics per minute and vasopressin at 0.04 units/min. Titrate for MAP greater than 65    Pulmonary: Acute respiratory failure-continue full vent support    GI: N.p.o.  Status postdebridement of perineum genitalia on 7/21 for necrotizing soft tissue infection  Take back time to be determined    FEN: Acute renal injury-continue IV fluids. Continue bicarb drip  Lactic acidosis multifactorial-patient is in diabetic ketoacidosis and had elevated lactic acid secondary to necrotizing soft tissue infection  Continue bicarb drip at 150 cc/h    ID: Continue Zosyn, clinda, vancomycin for necrotizing soft tissue infection. Await culture    Heme: Monitor CBC    Endo: Monitor Blood Sugars.  Target blood glucose less than 180 in the ICU. Continue insulin drip. Patient is a poorly controlled diabetic. Her hemoglobin A1c is 9.9. Diabetic ketoacidosis protocol    DVT prophylaxis--SCDS, heparin 3 times daily  GI Prophylaxis--Protonix  Lines--arterial line, central line 7/21  CODE: Limited code-no intubation/reintubation, okay for defib cardioversion chest compression recessive medications     DISPOSITION-Continue ICU Care    Critical care time exclusive of teaching and procedures = 45 min     I provided critical care to a patient with septic shock and diabetic ketoacidosis in the setting of necrotizing soft tissue infection, multisystem organ failure requiring frequent and emergent imaging, lab studies, intensive monitoring, data review, and adjusting the clinical plan as well as urgent coordination with multiple specialists. Pt needs continuous ICU monitoring because the patient is at risk for deterioration from a multisystem organ failure standpoint    I have updated patient's daughter and brother at bedside. I explained to them that she is a multisystem organ failure and critically ill. However I state that she is taking baby steps in the correct direction and slowly improving. Informed the blood sugars are improving the number of pressor required have been decreased and that the tentative plan will be for a takeback to the operating room either Friday or Saturday    Argentina Travis MD, FACS  7/21/2022  9:08 AM    NOTE: This report was transcribed using voice recognition software. Every effort was made to ensure accuracy; however, inadvertent computerized transcription errors may be present.

## 2022-07-21 NOTE — CONSULTS
Surgical Intensive Care Unit  Consult Note    Date of admission:  7/20/2022    Reason for ICU transfer:  Erika    Subjective:  Ford Ojeda is a 74 y.o. female with PMH of  type 2 diabetes, CHF with an EF of 45 to 50%, cardiomyopathy who presents with approx 10 days of perineal pain and right buttock pain. She presented today by EMS for AMS and unresponsiveness. Apparently she was sent here for hypoglycemia. EMS reports that her blood sugar was in the 300s upon their arrival and she was responsive but in route she became unresponsive. Presently on vasopressors. Hyponatremic. Hypotensive on arrival, additionally had bradycardia. Afebrile. TOMÁS, glucose >300. CT a/p with evidence of gas in the soft tissues c/w necrotizing fasciitis of right buttocks, perineum, mons pubis extending to lower abdominal wall     At time of my exam patient was alert and oriented and able to participate in history and exam.  Chart review shows limited code with no intubation however she does wish to proceed with surgery and states she is ok with intubation for surgery and mechanical ventilation postop if necessary. Hospital Course:  7/21: Admitted with Erika. Taken to OR for excisional debridement. On multiple pressors. Admitted to SICU post-op.     Physical Exam:  /63   Pulse 75   Temp 98 °F (36.7 °C)   Resp 14   Ht 5' 4\" (1.626 m)   Wt 217 lb (98.4 kg)   SpO2 100%   BMI 37.25 kg/m²     CONSTITUTIONAL:  Intubated, sedated  EYES:  Lids and lashes normal, pupils equal, round and reactive to light, extra ocular muscles intact, sclera clear, conjunctiva normal  NECK:  supple, symmetrical, trachea midline  LUNGS:  On mechanical ventilation  CARDIOVASCULAR:  RR and normotensive on multiple pressors  ABDOMEN:  Soft, no grimace to palpation, nondistended  SKIN:  Large wound to right perineum and labia with heavy drainage pack    ASSESSMENT / PLAN:  Neuro: Acute pain secondary to surgery, pain control PRN  CV: Septic shock, continue IVFs and pressors, wean as able. Elevated BNP secondary to CHF, hold home meds for now. Elevated troponin, likely demand ischemia, monitor. Hx HLD, continue Crestor  Pulm: Acute hypoxemic respiratory failure, on mechanical ventilation, wean as able  GI: Elevated LFTs likely secondary to hypotension, monitor  Renal: TOMÁS, urine lytes pre-renal, continue IVFs, monitor UOP and Cr  ID: Bakari's s/p excisional debridement, continue Vanc/Zosyn and Clinda  Endo: Hyperglycemia, check beta-hydroxybutyrate, if in DKA then start DKA protocol, if not then start insulin drip  MSK: Large perineal wound, continue local wound care, takeback timing TBD    Bowel regime: Glycolax   Pain control/Sedation: Tylenol, Precedex, Fentanyl gtt   DVT prophylaxis: SCDs, heparin   GI: Pepcid   Glucose protocol: Insulin gtt  Mouth/Eye care: As needed  [place]: Keep in place for critical care monitoring of fluid balance.   CVC sites: L subclavian TLC Day #1, L radial A-line Day #1  Ancillary consults: IM  Family Update: As available     Code status:   Limited    Electronically signed by Jonna Pugh MD on 7/21/2022 at 2:50 AM

## 2022-07-21 NOTE — PROGRESS NOTES
This patient is on medication that requires renal, weight, and/or indication dose adjustment. Date Body Weight IBW  Adjusted BW SCr  CrCl Dialysis status   7/21/2022 217 lb (98.4 kg) Ideal body weight: 54.7 kg (120 lb 9.5 oz)  Adjusted ideal body weight: 72.2 kg (159 lb 2.5 oz) Serum creatinine: 3.7 mg/dL (H) 07/20/22 1945  Estimated creatinine clearance: 15 mL/min (A) N/a       Pharmacy has dose-adjusted the following medication(s):    Date Previous Order Adjusted Order   7/21/2022 Piperacillin-tazobactam 4,500 mg IV q8h Piperacillin-tazobactam 3,375mg IV q12h       These changes were made per protocol according to the [facility] Clinical Guidance for Pharmacists. *Please note this dose may need readjusted if patient's condition changes. Please contact pharmacy with any questions regarding these changes.     Penelope Hancock, PharmD, RP, BCPS 7/21/2022 2:46 AM

## 2022-07-21 NOTE — ED PROVIDER NOTES
Department of Emergency Medicine   ED  Provider Note  Admit Date/RoomTime: 7/20/2022  3:13 PM  ED Room: 05/05          History of Present Illness:  7/20/22, Time: 11:50 PM EDT  Chief Complaint   Patient presents with    Altered Mental Status     PER EMS INITIAL CALL WAS FOR HYPERGLYCMIA PT ARRIVED UNRESPONSIVE TO ER. Bonny King is a 74 y.o. female presenting to the ED for unresponsiveness, beginning just prior to arrival.  The complaint has been persistent, severe in severity, and worsened by nothing. The patient is a 68-year-old female with a history of type 2 diabetes, CHF with an EF of 45 to 50% who presents to the emergency department via EMS from home for altered mental status and unresponsiveness. The patient symptoms were sudden onset just prior to arrival, persistent, moderate severity, nothing makes it better or worse. Apparently she was sent here for hyperglycemia. EMS reports that her blood sugar was in the 300s upon their arrival and she was responsive but in route she became unresponsive. History and review of systems is otherwise limited. The niece who is a power of  later arrives to provide further history. She states that the patient was not acting like herself this afternoon and thought it was due to her elevated blood sugar. She states that she was fine earlier this morning and last night and nothing like this ever happened in the past.  She states that she does not have a history of any renal problems, but does have a significant cardiac history. Review of Systems: Limited due to mental status        --------------------------------------------- PAST HISTORY ---------------------------------------------  Past Medical History:  has a past medical history of Asthma, CHF (congestive heart failure) ([facility] [address] Diabetes mellitus ([facility] Diverticular disease, Fibromyalgia muscle pain, Glaucoma, Hypertension, and Neuropathy.     Past Surgical History:  has a past surgical history that includes Hysterectomy. Social History:  reports that she has never smoked. She has never used smokeless tobacco. She reports that she does not drink alcohol and does not use drugs. Family History: family history includes Cancer in her brother; Heart Failure in her mother; Kidney Disease in her mother; Other in her father and sister. . Unless otherwise noted, family history is non contributory    The patients home medications have been reviewed. Allergies: Patient has no known allergies. I have reviewed the past medical history, past surgical history, social history, and family history    ---------------------------------------------------PHYSICAL EXAM--------------------------------------    Constitutional/General: Patient was unresponsive on arrival with agonal respirations and nonrebreather in place  Head: Normocephalic and atraumatic  Eyes: PERRL, EOMI, sclera non icteric  ENT: Oropharynx clear, handling secretions, no trismus, no asymmetry of the posterior oropharynx or uvular edema  Neck: Supple, full ROM, no stridor, no meningeal signs  Respiratory: Lungs are diminished bilaterally. No wheezing/rales/rhonchi. She is agonally breathing. Cardiovascular: Bradycardic rate. Regular rhythm. No murmurs, no gallops, no rubs. 2+ distal pulses. Equal extremity pulses. Gastrointestinal:  Abdomen Soft, Non tender, Non distended. No rebound, guarding, or rigidity. No pulsatile masses. Musculoskeletal: Moves all extremities x 4. Warm and well perfused, no clubbing, no cyanosis, no edema. Capillary refill <3 seconds  Skin: skin warm and dry. No rashes. Neurologic: Altered and minimally responsive. No focal deficits appreciated.   Glascow Coma Scale  Best Eye Response 3 - Opens eyes to loud noise or command   Best Verbal Response 2 - Moans, makes unintelligible sounds   Best Motor Response 4 - Moves part of body but does not remove noxious stimulus   Total 9     Psychiatric: Normal Affect          -------------------------------------------------- RESULTS -------------------------------------------------  I have personally reviewed all laboratory and imaging results for this patient. Results are listed below.      LABS: (Lab results interpreted by me)  Results for orders placed or performed during the hospital encounter of 07/20/22   COVID-19, Rapid    Specimen: Nasopharyngeal Swab   Result Value Ref Range    SARS-CoV-2, NAAT Not Detected Not Detected   CBC with Auto Differential   Result Value Ref Range    WBC 34.2 (H) 4.5 - 11.5 E9/L    RBC 3.70 3.50 - 5.50 E12/L    Hemoglobin 9.3 (L) 11.5 - 15.5 g/dL    Hematocrit 29.4 (L) 34.0 - 48.0 %    MCV 79.5 (L) 80.0 - 99.9 fL    MCH 25.1 (L) 26.0 - 35.0 pg    MCHC 31.6 (L) 32.0 - 34.5 %    RDW 14.2 11.5 - 15.0 fL    Platelets 425 942 - 374 E9/L    MPV 11.4 7.0 - 12.0 fL    Neutrophils % 87.8 (H) 43.0 - 80.0 %    Lymphocytes % 0.9 (L) 20.0 - 42.0 %    Monocytes % 6.1 2.0 - 12.0 %    Eosinophils % 0.9 0.0 - 6.0 %    Basophils % 0.6 0.0 - 2.0 %    Neutrophils Absolute 31.46 (H) 1.80 - 7.30 E9/L    Lymphocytes Absolute 0.34 (L) 1.50 - 4.00 E9/L    Monocytes Absolute 2.05 (H) 0.10 - 0.95 E9/L    Eosinophils Absolute 0.31 0.05 - 0.50 E9/L    Basophils Absolute 0.00 0.00 - 0.20 E9/L    Metamyelocytes Relative 2.6 (H) 0.0 - 1.0 %    Myelocyte Percent 1.7 0 - 0 %    Anisocytosis 1+     Polychromasia 1+     Poikilocytes 3+     Acanthocytes 1+     Beatriz Cells 3+     Ovalocytes 2+    Comprehensive Metabolic Panel w/ Reflex to MG   Result Value Ref Range    Sodium 134 132 - 146 mmol/L    Potassium reflex Magnesium 5.9 (H) 3.5 - 5.0 mmol/L    Chloride 97 (L) 98 - 107 mmol/L    CO2 15 (L) 22 - 29 mmol/L    Anion Gap 22 (H) 7 - 16 mmol/L    Glucose 228 (H) 74 - 99 mg/dL    BUN 61 (H) 6 - 23 mg/dL    Creatinine 3.4 (H) 0.5 - 1.0 mg/dL    GFR Non-African American 16 >=60 mL/min/1.73    GFR African American 16     Calcium 8.6 8.6 - 10.2 mg/dL    Total Protein 6.4 6.4 - 8.3 g/dL    Albumin 2.2 (L) 3.5 - 5.2 g/dL    Total Bilirubin 0.9 0.0 - 1.2 mg/dL    Alkaline Phosphatase 177 (H) 35 - 104 U/L    ALT 43 (H) 0 - 32 U/L     (H) 0 - 31 U/L   Lactate, Sepsis   Result Value Ref Range    Lactic Acid, Sepsis 7.4 (HH) 0.5 - 1.9 mmol/L   Lactate, Sepsis   Result Value Ref Range    Lactic Acid, Sepsis 3.6 (HH) 0.5 - 1.9 mmol/L   Brain Natriuretic Peptide   Result Value Ref Range    Pro-BNP 7,528 (H) 0 - 450 pg/mL   Cortisol Total   Result Value Ref Range    Cortisol 33.69 (H) 2.68 - 18.40 mcg/dL   Urinalysis with Microscopic   Result Value Ref Range    Color, UA Yellow Straw/Yellow    Clarity, UA Clear Clear    Glucose, Ur 250 (A) Negative mg/dL    Bilirubin Urine Negative Negative    Ketones, Urine Negative Negative mg/dL    Specific Gravity, UA >=1.030 1.005 - 1.030    Blood, Urine MODERATE (A) Negative    pH, UA 5.5 5.0 - 9.0    Protein,  (A) Negative mg/dL    Urobilinogen, Urine 1.0 <2.0 E.U./dL    Nitrite, Urine Negative Negative    Leukocyte Esterase, Urine Negative Negative    WBC, UA 1-3 0 - 5 /HPF    RBC, UA NONE 0 - 2 /HPF    Bacteria, UA MANY (A) None Seen /HPF   URINE DRUG SCREEN   Result Value Ref Range    Amphetamine Screen, Urine NOT DETECTED Negative <1000 ng/mL    Barbiturate Screen, Ur NOT DETECTED Negative < 200 ng/mL    Benzodiazepine Screen, Urine NOT DETECTED Negative < 200 ng/mL    Cannabinoid Scrn, Ur NOT DETECTED Negative < 50ng/mL    Cocaine Metabolite Screen, Urine NOT DETECTED Negative < 300 ng/mL    Opiate Scrn, Ur NOT DETECTED Negative < 300ng/mL    PCP Screen, Urine NOT DETECTED Negative < 25 ng/mL    Methadone Screen, Urine NOT DETECTED Negative <300 ng/mL    Oxycodone Urine POSITIVE (A) Negative <100 ng/mL    FENTANYL SCREEN, URINE NOT DETECTED Negative <1 ng/mL    Drug Screen Comment: see below    Serum Drug Screen   Result Value Ref Range    Ethanol Lvl <10 mg/dL    Acetaminophen Level <5.0 (L) 10.0 - 31.5 mcg/mL    Salicylate, Serum <8.2 0.0 - 30.0 mg/dL    TCA Scrn NEGATIVE Cutoff:300 ng/mL   SPECIMEN REJECTION   Result Value Ref Range    Rejected Test TRP5     Reason for Rejection see below    URINE ELECTROLYTES   Result Value Ref Range    Sodium, Ur 40 Not Established mmol/L    Potassium, Ur 70.1 Not Established mmol/L    Chloride 21 Not Established mmol/L   OSMOLALITY, SERUM   Result Value Ref Range    Osmolality 308 285 - 310 mOsm/Kg   CREATININE, RANDOM URINE   Result Value Ref Range    Creatinine, Ur 144 29 - 226 mg/dL   Potassium   Result Value Ref Range    Potassium 4.8 3.5 - 5.0 mmol/L   Troponin   Result Value Ref Range    Troponin, High Sensitivity 47 (H) 0 - 9 ng/L   Blood Gas, Arterial   Result Value Ref Range    Date Analyzed 20220720     Time Analyzed 1814     Source: Blood Arterial     pH, Blood Gas 7.334 (L) 7.350 - 7.450    PCO2 33.2 (L) 35.0 - 45.0 mmHg    PO2 191.6 (H) 75.0 - 100.0 mmHg    HCO3 17.3 (L) 22.0 - 26.0 mmol/L    B.E. -7.7 (L) -3.0 - 3.0 mmol/L    O2 Sat 98.8 (H) 92.0 - 98.5 %    O2Hb 98.1 (H) 94.0 - 97.0 %    COHb 0.3 0.0 - 1.5 %    MetHb 0.4 0.0 - 1.5 %    O2 Content 15.6 mL/dL    HHb 1.2 0.0 - 5.0 %    tHb (est) 11.0 (L) 11.5 - 16.5 g/dL    Mode NRB 15L     Date Of Collection      Time Collected      Pt Temp 37.0 C     ID 1741     Lab [id number]     Critical(s) Notified .  No Critical Values    Blood Gas, Arterial   Result Value Ref Range    Date Analyzed 20220720     Time Analyzed 1928     Source: Blood Arterial     pH, Blood Gas 7.332 (L) 7.350 - 7.450    PCO2 33.5 (L) 35.0 - 45.0 mmHg    PO2 167.2 (H) 75.0 - 100.0 mmHg    HCO3 17.3 (L) 22.0 - 26.0 mmol/L    B.E. -7.6 (L) -3.0 - 3.0 mmol/L    O2 Sat 98.7 (H) 92.0 - 98.5 %    O2Hb 98.1 (H) 94.0 - 97.0 %    COHb 0.5 0.0 - 1.5 %    MetHb 0.1 0.0 - 1.5 %    HHb 1.3 0.0 - 5.0 %    tHb (est) 11.2 (L) 11.5 - 16.5 g/dL    Potassium 4.55 3.50 - 5.00 mmol/L    Mode NRB 15L     Date Of Collection      Time Collected      Pt Temp 37.0 C     ID 1720     Lab [id number] Critical(s) Notified . No Critical Values    Comprehensive Metabolic Panel w/ Reflex to MG   Result Value Ref Range    Sodium 131 (L) 132 - 146 mmol/L    Potassium reflex Magnesium 4.9 3.5 - 5.0 mmol/L    Chloride 93 (L) 98 - 107 mmol/L    CO2 16 (L) 22 - 29 mmol/L    Anion Gap 22 (H) 7 - 16 mmol/L    Glucose 345 (H) 74 - 99 mg/dL    BUN 68 (H) 6 - 23 mg/dL    Creatinine 3.7 (H) 0.5 - 1.0 mg/dL    GFR Non-African American 14 >=60 mL/min/1.73    GFR African American 14     Calcium 8.8 8.6 - 10.2 mg/dL    Total Protein 6.4 6.4 - 8.3 g/dL    Albumin 2.3 (L) 3.5 - 5.2 g/dL    Total Bilirubin 1.1 0.0 - 1.2 mg/dL    Alkaline Phosphatase 199 (H) 35 - 104 U/L    ALT 48 (H) 0 - 32 U/L     (H) 0 - 31 U/L   Troponin   Result Value Ref Range    Troponin, High Sensitivity 54 (H) 0 - 9 ng/L   POCT Venous   Result Value Ref Range    POC Sodium 140 132 - 146 mmol/L    POC Potassium 3.8 3.5 - 5.0 mmol/L    POC Chloride 108 100 - 108 mmol/L    POC Glucose 194 (H) 74 - 99 mg/dl    POC Creatinine 3.3 (H) 0.5 - 1.0 mg/dL    GFR Non-African American 14 >=60 mL/min/1.73    GFR  17     Performed on SEE BELOW    POCT glucose   Result Value Ref Range    Glucose 272 mg/dL    QC OK? ok    EKG 12 Lead   Result Value Ref Range    Ventricular Rate 86 BPM    Atrial Rate 86 BPM    P-R Interval 146 ms    QRS Duration 100 ms    Q-T Interval 404 ms    QTc Calculation (Bazett) 483 ms    P Axis 47 degrees    R Axis -7 degrees    T Axis 74 degrees   ,       RADIOLOGY:  Interpreted by Radiologist unless otherwise specified  CT HEAD WO CONTRAST   Final Result   No acute intracranial abnormality. CT ABDOMEN PELVIS WO CONTRAST Additional Contrast? 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 No drainable fluid collection. Clinical correlation recommended. No renal or ureteral stone.   No hydronephrosis. Cholelithiasis without CT evidence of acute cholecystitis. Critical results were called by Dr. Migdalia Hayward MD to Dr. Tommie Avelar on 7/20/2022 at 22:22. XR CHEST PORTABLE   Final Result   No acute process. Cardiomegaly. EKG Interpretation  Interpreted by emergency department physician, Dr. Tres Ribeiro   EKG: This EKG is signed and interpreted by me. Rate: 86  Rhythm: Sinus  Interpretation: Normal sinus rhythm, normal axis, left atrial enlargement, nonspecific ST changes throughout, intervals within normal limits, QTC is 483  Comparison: stable as compared to patient's most recent EKG       ------------------------- NURSING NOTES AND VITALS REVIEWED ---------------------------   The nursing notes within the ED encounter and vital signs as below have been reviewed by myself  BP (!) 109/43   Pulse 82   Temp 98.1 °F (36.7 °C)   Resp 23   Wt 208 lb (94.3 kg)   SpO2 92%   BMI 38.04 kg/m²     Oxygen Saturation Interpretation: Abnormal    The patients available past medical records and past encounters were reviewed.         ------------------------------ ED COURSE/MEDICAL DECISION MAKING----------------------  Medications   norepinephrine (LEVOPHED) 16 mg in dextrose 5% 250 mL infusion (60 mcg/min IntraVENous Rate/Dose Change 7/20/22 2328)   EPINEPHrine (EPINEPHrine HCL) 5 mg in dextrose 5 % 250 mL infusion (16 mcg/min IntraVENous Rate/Dose Change 7/20/22 2328)   vancomycin (VANCOCIN) 2,000 mg in dextrose 5 % 500 mL IVPB (2,000 mg IntraVENous New Bag 7/20/22 2251)   vasopressin 20 Units in dextrose 5 % 100 mL infusion (has no administration in time range)   dextrose bolus 10% 125 mL (has no administration in time range)     Or   dextrose bolus 10% 250 mL (has no administration in time range)   potassium chloride 10 mEq/100 mL IVPB (Peripheral Line) (has no administration in time range)   magnesium sulfate 1000 mg in dextrose 5% 100 mL IVPB (has no administration in time range)   sodium phosphate 10 mmol in sodium chloride 0.9 % 250 mL IVPB (has no administration in time range)     Or   sodium phosphate 15 mmol in dextrose 5 % 250 mL IVPB (has no administration in time range)     Or   sodium phosphate 20 mmol in dextrose 5 % 500 mL IVPB (has no administration in time range)   polyethylene glycol (GLYCOLAX) packet 17 g (has no administration in time range)   0.9 % sodium chloride bolus (has no administration in time range)     Followed by   0.9 % sodium chloride infusion (has no administration in time range)   insulin regular (HUMULIN R;NOVOLIN R) 100 Units in sodium chloride 0.9 % 100 mL infusion (has no administration in time range)   dextrose 5 % and 0.45 % NaCl with KCl 20 mEq infusion (has no administration in time range)   atropine injection 0.5 mg (0.5 mg IntraVENous Given 7/20/22 1545)   calcium chloride 10 % injection 1,000 mg (1,000 mg IntraVENous Given 7/20/22 1546)   sodium bicarbonate 8.4 % injection 50 mEq (50 mEq IntraVENous Given 7/20/22 1548)   piperacillin-tazobactam (ZOSYN) 4,500 mg in dextrose 5 % 100 mL IVPB (Diyi8Mra) (0 mg IntraVENous Stopped 7/20/22 2139)   hydrocortisone sodium succinate PF (SOLU-CORTEF) injection 100 mg (100 mg IntraVENous Given 7/20/22 2020)   clindamycin (CLEOCIN) 900 mg in dextrose 5 % 50 mL IVPB (0 mg IntraVENous Stopped 7/21/22 0020)   insulin regular (HUMULIN R;NOVOLIN R) injection 8 Units (8 Units IntraVENous Given 7/20/22 2349)   0.9 % sodium chloride bolus (0 mLs IntraVENous Stopped 7/21/22 0020)           The cardiac monitor revealed NSR with a heart rate in the 60s as interpreted by me. The cardiac monitor was ordered secondary to the patient's unresponsiveness and to monitor the patient for dysrhythmia. CPT [id number]       I, Dr. Griselda London, am the primary provider of record    Medical Decision Making:   The patient is a 68-year-old female who presents the emergency department for unresponsiveness.   Patient's heart rate was initially 30 and blood pressure was 55/31 on arrival.  Sugar was in the 200s. Patient was given atropine along with multiple rounds of push dose epi. Levophed and epinephrine were hung peripherally. Initially was planning on intubation as patient was unresponsive on arrival, but the power of  who is the niece later arrived and states that the patient would not want to be intubated and that she is a limited code. She states that she would be okay with chest compressions to restart her heart if necessary and is okay with medications to restart her heart and everything up into the point of intubation. I did also discuss BiPAP with them and they state that they do not want patient on BiPAP either. She did improve after the pressors were hung. She was started on broad-spectrum antibiotics including vancomycin and Zosyn. Patient was found to be in septic shock with a significant leukocytosis with left shift and lactic acidosis. Source was initially unknown. Did obtain CT which showed evidence of Bakari's. Added clindamycin after the CT scan resulted. She is also found to be in acute renal failure. Central and arterial lines were placed. Consulted with surgery team who came to evaluate the patient states patient will be admitted to the surgical ICU under a medical admission. Patient borderline DKA. Beta hydroxybutyrate pending. Does have a slightly elevated anion gap and repeating BMP after IV fluids and insulin. If anion gap remains elevated will start on insulin drip. Patient admitted to medicine status post OR and will go to the surgical ICU. Hospitalist accepted. Oxygen Saturation Interpretation: 100 % on room air. Re-Evaluations:  ED Course as of 07/21/22 0046   Wed Jul 20, 2022   1701 ECHO 2021     Summary   Moderate to severe proximal septal wall thickness (1.5 to 1.6cm). Consider   cardiac MRI for further evaluation if none has been done recently.    Akinetic apex   Ejection fraction is visually estimated at 48% by Albarran's method. Mild RVH   Normal right ventricular size and function. Mild tricuspid regurgitation. [KG]   u Jul 21, 2022   0023 Consulted with hospitalist, who accepted for admission. [KG]   0023 Surgery states patient is okay with intubation and surgery at this point and confirmed with POA. Code status changed per surgery residents. She spoke with the POA on the phone who confirmed they are okay with surgery and intubation for surgery if necessary. [KG]      ED Course User Index  [KG] Sophie López, DO       Arterial Line Placement Procedure Note                     Indication: Need for serial blood work, arterial blood gases, and severe hypotension    Consent: The POA counseled regarding the procedure, its indications, risks, potential complications and alternatives, and any questions were answered. Consent was obtained to proceed. Dev's Test: Was not performed    Procedure: The skin over the right femoral artery was prepped with betadine and draped in a sterile fashion. Local anesthesia was not performed due to the emergent nature of this procedure. An angiocath was then inserted, over a needle, using a modified Seldinger technique, into the vessel. The transducer set was then attached and securely fastened to the skin with sutures. Waveforms on the monitor were observed and found to be adequate. The patient had good distal perfusion after the procedure. The site was then dressed in a sterile fashion. The patient tolerated the procedure well. Complications: None      Central Line Placement Procedure Note    Indication: centrally administered medications    Consent: The POA counseled regarding the procedure, its indications, risks, potential complications and alternatives, and any questions were answered. Consent was obtained to proceed. Procedure:  The patient was positioned appropriately and the skin over the right femoral vein was prepped with betadine and draped in a sterile fashion. Local anesthesia was not performed due to the emergent nature of this procedure. A large bore needle was used to identify the vein. A guide wire was then inserted into the vein through the needle. A triple lumen catheter was then inserted into the vessel over the guide wire using the Seldinger technique. All ports showed good, free flowing blood return and were flushed with saline solution. The catheter was then securely fastened to the skin with suture. Two sutures were placed into the kit included tube clamp, proximal eyelets, and a suture end from each of the securing sutures was extended around the catheter and tied to the proximal eyelets as an added measure to prevent dislodgement. An antibiotic disk was placed and the site was then covered with a sterile dressing. A post procedure X-ray was not indicated. The patient tolerated the procedure well. Complications: None          This patient's ED course included: a personal history and physicial examination, re-evaluation prior to disposition, multiple bedside re-evaluations, IV medications, cardiac monitoring, continuous pulse oximetry, and complex medical decision making and emergency management    This patient has remained hemodynamically stable during their ED course. Consultations:  Spoke with surgery  (Surgery). Discussed case. They will provide consultation. Spoke with Dr. Gudelia Tobar NP (Medicine). Discussed case. They will provide consultation. Counseling: The emergency provider has spoken with the patient and discussed todays results, in addition to providing specific details for the plan of care and counseling regarding the diagnosis and prognosis.   Questions are answered at this time and they are agreeable with the plan.       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 Septic shock ([facility] [address]    3. Lactic acidosis    4. Acute renal failure, unspecified acute renal failure type ([address]    5. Acute on chronic combined systolic and diastolic CHF (congestive heart failure) ([facility] [address]    6. Diabetic ketoacidosis without coma associated with type 2 diabetes mellitus ([facility] [address]        DISPOSITION  Disposition: Admit to CCU/ICU  Patient condition is critical        NOTE: This report was transcribed using voice recognition software. Every effort was made to ensure accuracy; however, inadvertent computerized transcription errors may be present      Please note that the withdrawal or failure to initiate urgent interventions for this patient would likely result in a life threatening deterioration or permanent disability. Accordingly this patient received 80 minutes of critical care time, excluding separately billable procedures.        Fransisco Sim, DO  07/21/22 2001 [facility],   07/21/22 2001 [facility],   07/21/22 5961

## 2022-07-21 NOTE — PLAN OF CARE
Problem: Pain  Goal: Verbalizes/displays adequate comfort level or baseline comfort level  Outcome: Progressing     Problem: Skin/Tissue Integrity  Goal: Absence of new skin breakdown  Description: 1. Monitor for areas of redness and/or skin breakdown  2. Assess vascular access sites hourly  3. Every 4-6 hours minimum:  Change oxygen saturation probe site  4. Every 4-6 hours:  If on nasal continuous positive airway pressure, respiratory therapy assess nares and determine need for appliance change or resting period. Outcome: Progressing     Problem: Safety - Medical Restraint  Goal: Remains free of injury from restraints (Restraint for Interference with Medical Device)  Description: INTERVENTIONS:  1. Determine that other, less restrictive measures have been tried or would not be effective before applying the restraint  2. Evaluate the patient's condition at the time of restraint application  3. Inform patient/family regarding the reason for restraint  4.  Q2H: Monitor safety, psychosocial status, comfort, nutrition and hydration  7/21/2022 1210 by Bree García RN  Outcome: Progressing  Flowsheets (Taken 7/21/2022 0800)  Remains free of injury from restraints (restraint for interference with medical device): Every 2 hours: Monitor safety, psychosocial status, comfort, nutrition and hydration  7/21/2022 0650 by Deepa Bustamante RN  Outcome: Progressing  Flowsheets (Taken 7/21/2022 0400)  Remains free of injury from restraints (restraint for interference with medical device): Every 2 hours: Monitor safety, psychosocial status, comfort, nutrition and hydration

## 2022-07-21 NOTE — H&P
Hospitalist History & Physical      PCP: Abdoul Yu DO    Date of Service: Pt seen/examined on 7/21/2022    admitted to Inpatient with expected LOS greater than two midnights due to medical therapy. Chief Complaint:  had concerns including Altered Mental Status (PER EMS INITIAL CALL WAS FOR HYPERGLYCMIA PT ARRIVED UNRESPONSIVE TO ER. ). History of Present Illness:    Ms. Rogelio Ware, a 74y.o. year old female  who  has a past medical history of Asthma, CHF (congestive heart failure) ([facility] [address] Diabetes mellitus ([facility] [address] Diverticular disease, Fibromyalgia muscle pain, Glaucoma, Hypertension, and Neuropathy. Patient presented to emergency department via EMS after daughter called 911 for blood sugar of 305, increased abdominal pain and \"lethargy\" that occurred suddenly yesterday afternoon. Patient states that she has been experiencing low back/hip pain for the last month that is moderate in severity, worsened and relieved by nothing. Additionally, patient complains of abdominal pain that has been worsening over the last week. She was scheduled to go to her PCP on day of complaint but was too weak to leave her home. Patient denies chest pain, shortness of breath, fever, aches, chills. Patient was hypotensive upon arrival to the ED and was started on Levophed and epinephrine. Patient does have history of CHF with EF of 45 to 50%, diverticulitis and diabetes. 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 Surgery was consulted and patient was taken to surgery for excisional debridement of perineum, right buttocks, mons pubis, and possible lower abdominal wall. Patient was admitted to medicine for further observation and management of sepsis.   ER COURSE:  Course significant for BMP with an decreased sodium of 131, decreased chloride at 93, decreased CO2 at 16, increased BUN at 68, increased creatinine to 3.7 and elevated anion gap at 22. Lactic acid was elevated at 3.6  Troponin on presentation was 47 and subsequently raised to 54. GI liver profile significant for decreased albumin at 2.3 elevated alk phos at 199, ALT elevated at 48, AST elevated at 113 and the patient's blood glucose level was 345  CBC significant for elevated WBC at 34.2, hemoglobin decreased at 9.3, and hematocrit 29.4  Blood cultures were drawn in ER  Urinalysis was positive for glucose with a moderate amount of blood and protein appreciated. Leukocyte esterase was negative with many bacteria seen. Initial ABGs showed pH of 7.332, CO2 33.5 and bicarb 17.3  An EKG showed normal sinus rhythm, with possible left atrial enlargement, septal infarct age undetermined, possible lateral infarct age undetermined. A CT of the head was performed and showed no acute intercranial abnormality. 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 Chest x-ray showed no acute process and cardiomegaly  Past Medical History:        Diagnosis Date    Asthma     CHF (congestive heart failure) (HCC)     Diabetes mellitus ([facility] [address]     Diverticular disease     Fibromyalgia muscle pain     Glaucoma     Hypertension     Neuropathy        Past Surgical History:        Procedure Laterality Date    HYSTERECTOMY         Medications Prior to Admission:      Prior to Admission medications    Medication Sig Start Date End Date Taking?  Authorizing Provider   spironolactone (ALDACTONE) 25 MG tablet Take 0.5 tablets by mouth daily 4/6/22   Huan Akbar MD   VITAMIN D PO Take 1 tablet by mouth daily Patient takes over the counter gummie    Historical MD Whit   Cyanocobalamin (VITAMIN B12 PO) Take 1 tablet by mouth daily Patient takes over the counter gummie    Historical MD Whit carvedilol (COREG) 25 MG tablet Take 1 tablet by mouth 2 times daily (with meals) 3/7/22   Cali Akbar MD   sacubitril-valsartan (ENTRESTO) 24-26 MG per tablet Take 1 tablet by mouth 2 times daily 3/7/22   Cali Akbar MD   aspirin EC 81 MG EC tablet Take 1 tablet by mouth daily 2/10/22   Aubrie Lara MD   rosuvastatin (CRESTOR) 20 MG tablet Take 1 tablet by mouth daily 2/10/22   Aubrie Lara MD   isosorbide mononitrate (IMDUR) 30 MG extended release tablet Take 1 tablet by mouth daily 1/26/22   Cali Akbar MD   ferrous sulfate 325 (65 Fe) MG tablet Take 325 mg by mouth daily (with breakfast)    Historical Provider, MD   cyclobenzaprine (FLEXERIL) 10 MG tablet Take 10 mg by mouth 3 times daily as needed for Muscle spasms     Historical Provider, MD   vitamin C (ASCORBIC ACID) 500 MG tablet Take 500 mg by mouth daily Patient takes 2 gummies daily    Historical Provider, MD   FLUoxetine (PROZAC) 20 MG capsule Take 20 mg by mouth daily    Historical Provider, MD   insulin glargine (LANTUS) 100 UNIT/ML injection vial Inject 30 Units into the skin nightly     Historical Provider, MD   oxyCODONE-acetaminophen (PERCOCET) 7.5-325 MG per tablet Take 1 tablet by mouth every 6 hours as needed for Pain. Historical Provider, MD       Allergies:  Patient has no known allergies. Social History:    RESIDENCE: home  TOBACCO:   reports that she has never smoked. She has never used smokeless tobacco.  ETOH:   reports no history of alcohol use. Family History:    Reviewed in detail and negative for DM, CAD, Cancer, CVA. Positive as follows\"      Problem Relation Age of Onset    Kidney Disease Mother     Heart Failure Mother     Other Father         aneurysm    Other Sister         scleroderma    Cancer Brother         throat       Review of Systems:   All bolded are positive; please see HPI  General:  Fever, chills, diaphoresis, fatigue, malaise, night sweats, weight loss  Psychological: Anxiety, disorientation, hallucinations. ENT:  Epistaxis, headaches, vertigo, visual changes. Cardiovascular:  Chest pain, irregular heartbeats, palpitations, paroxysmal nocturnal dyspnea. Respiratory:  Shortness of breath, coughing, sputum production, hemoptysis, wheezing, orthopnea. Gastrointestinal:  Nausea, vomiting, diarrhea, heartburn, constipation, abdominal pain, hematemesis, hematochezia, melena, acholic stools  Genito-Urinary:  Dysuria, urgency, frequency, hematuria  Musculoskeletal:  Joint pain, joint stiffness, joint swelling, muscle pain  Neurology:  Headache, focal neurological deficits, weakness, numbness, paresthesia  Derm:  Rashes, ulcers, excoriations, bruising  Extremities:  Decreased ROM, peripheral edema, mottling    Physical Exam:  BP (!) 109/43   Pulse 82   Temp 98.1 °F (36.7 °C)   Resp 23   Wt 208 lb (94.3 kg)   SpO2 92%   BMI 38.04 kg/m²   General appearance: Ill-appearing obese older female in moderate amount of distress, appears stated age and cooperative. Respiratory:  Clear to auscultation bilaterally. Normal respiratory effort. Cardiovascular:  RRR. S1, S2 without MRG. PV: Pulses palpable. No edema. Abdomen: Soft, tender, non-distended. +BS  Musculoskeletal: No obvious deformities. Skin: Normal skin color. No rashes or lesions. Good turgor. Neurologic:  Grossly non-focal. Awake, alert, following commands.    Psychiatric: Alert and oriented, thought content appropriate, normal insight and judgement    Reviewed EKG and CXR personally      CBC:   Recent Labs     07/20/22 1613   WBC 34.2*   RBC 3.70   HGB 9.3*   HCT 29.4*   MCV 79.5*   RDW 14.2        BMP:   Recent Labs     07/20/22  1534 07/20/22  1613 07/20/22  1928 07/20/22 1945   NA  --  134  --  131*   K  --  5.9* 4.55 4.8  4.9   CL  --  97*  --  93*   CO2  --  15*  --  16*   BUN  --  61*  --  68*   CREATININE 3.3* 3.4*  --  3.7*     LFT:  Recent Labs     07/20/22  1613 07/20/22  1945   PROT 6.4 6.4 ALKPHOS 177* 199*   ALT 43* 48*   * 113*   BILITOT 0.9 1.1     CE:  No results for input(s): CKTOTAL, TROPONINI in the last 72 hours. PT/INR: No results for input(s): INR, APTT in the last 72 hours. BNP: No results for input(s): BNP in the last 72 hours. ESR: No results found for: SEDRATE  CRP: No results found for: CRP  D Dimer:   Lab Results   Component Value Date    DDIMER 1823 10/20/2019      Folate and B12: No results found for: [id number], No results found for: FOLATE  Lactic Acid:   Lab Results   Component Value Date    LACTA 1.3 03/19/2019     Thyroid Studies:   Lab Results   Component Value Date    TSH 0.260 (L) 10/21/2019       Oupatient labs:  Lab Results   Component Value Date    TSH 0.260 (L) 10/21/2019    INR 1.2 10/24/2019    LABA1C 9.7 (H) 10/21/2019       Urinalysis:    Lab Results   Component Value Date/Time    NITRU Negative 07/20/2022 04:13 PM    WBCUA 1-3 07/20/2022 04:13 PM    BACTERIA MANY 07/20/2022 04:13 PM    RBCUA NONE 07/20/2022 04:13 PM    BLOODU MODERATE 07/20/2022 04:13 PM    SPECGRAV >=1.030 07/20/2022 04:13 PM    GLUCOSEU 250 07/20/2022 04:13 PM       Imaging:  CT ABDOMEN PELVIS WO CONTRAST Additional Contrast? 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 No drainable fluid collection. Clinical correlation recommended. No renal or ureteral stone. No hydronephrosis. Cholelithiasis without CT evidence of acute cholecystitis. Critical results were called by Dr. Shannan Mcleod MD to Dr. Tyler Garcia on 7/20/2022 at 22:22. CT HEAD WO CONTRAST    Result Date: 7/20/2022  No acute intracranial abnormality. XR CHEST PORTABLE    Result Date: 7/20/2022  No acute process. Cardiomegaly.          Assessment:  Septic shock, lactic acid on presentation 7.4, now 3.6  Bakari's gangrene, CT of the abdomen and pelvis showed soft tissue gas along the right perineum extending into the anterior lower abdominal wall and right gluteal region  Diabetic ketoacidosis, anion gap 22  Congestive heart failure with EF of 45 to 50% on echo performed in August 2021  History of diverticular disease  Hypertension  History of glaucoma  Acute kidney injury, creatinine 3.7 baseline seems to be 1.2-1.4      Plan:  Vasopressors to keep MAP greater than 65  OR for excisional debridement of perineum, right buttocks, mons pubis, possible lower abdominal wall  Continue IV antibiotics Vanco, Zosyn, clinda  Possible diabetic ketoacidosis, check beta hydroxybutyrate, consider insulin drip  Trend lactic acid  Check procalcitonin  Urine studies to determine cause of TOMÁS  Follow BMP, CBC      Diet: Diet NPO  Code Status: Limited  Surrogate decision maker confirmed with patient:   Extended Emergency Contact Information  Primary Emergency Contact: Radha Amaral  Address: [place], [address] [place] [address] Phone: [phone]  Mobile Phone: [phone]  Relation: Grandchild  Secondary Emergency Contact: Inocente Bennett Phone: [phone]  Mobile Phone: [phone]  Relation: Child   needed? No    DVT Prophylaxis: []Lovenox []Heparin [x]PCD [] [address] []Encouraged ambulation  Disposition: []Med/Surg [] Intermediate [x] ICU/CCU  Admit status: [] Observation [x] Inpatient     +++++++++++++++++++++++++++++++++++++++++++++++++  RADHA Gallegos Physician - [address], [place]  +++++++++++++++++++++++++++++++++++++++++++++++++  NOTE: This report was transcribed using voice recognition software. Every effort was made to ensure accuracy; however, inadvertent computerized transcription errors may be present.

## 2022-07-21 NOTE — PROGRESS NOTES
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

## 2022-07-21 NOTE — FLOWSHEET NOTE
Patient reaching for ett and ogt while performing patient care unable to redirect at this time will continue to monitor

## 2022-07-21 NOTE — PROGRESS NOTES
Comprehensive Nutrition Assessment    Type and Reason for Visit:  Initial, Consult    Nutrition Recommendations/Plan:     Continue NPO. Noted multi-system organ failure & hemodynamic instability    Hold off on EN support until stable. Please consult if recs are needed. Malnutrition Assessment:  Malnutrition Status:  No malnutrition (07/21/22 1516)    Context:  Acute Illness     Findings of the 6 clinical characteristics of malnutrition:  Energy Intake:  Mild decrease in energy intake (Comment)  Weight Loss:  No significant weight loss (per EMR wt gain trends)     Body Fat Loss:  No significant body fat loss  Muscle Mass Loss:  No significant muscle mass loss   Fluid Accumulation:  No significant fluid accumulation    Strength:  Not Performed    Nutrition Assessment:    Pt admit w/ AMS & unresponsiveness found to have Septic Shock & DKA in the setting of NSTI now s/p debridement. Noted TOMÁS & Elevated LFT's. PMHx DM & CHF. Pt currently intubated/sedated on 2 pressors. Would hold off on EN support at this time until hemodynamically stable. Will monitor & follow. Nutrition Related Findings:    Pt intubated/sedated, hypotension on pressor x 2, +I/O's 5L, no edema, hypoactive BS, OGT LIS, elevated LFT's Wound Type: Multiple, Surgical Incision (x 2, fourniers gangrene)       Current Nutrition Intake & Therapies:    Average Meal Intake: NPO     Diet NPO Exceptions are: Sips of Water with Meds    Anthropometric Measures:  Height: 5' 4\" (162.6 cm)  Ideal Body Weight (IBW): 120 lbs (55 kg)    Admission Body Weight: 217 lb (98.4 kg) (7/21 first measured)  Current Body Weight: 217 lb (98.4 kg) (7/21 actual), 180.8 % IBW.     Current BMI (kg/m2): 37.2  Usual Body Weight: 197 lb (89.4 kg) (Measured wt from OV encounter 11/3/21)  % Weight Change (Calculated): 10.2 wt gain per EMR trends                    BMI Categories: Obese Class 2 (BMI 35.0 -39.9)    Estimated Daily Nutrient Needs:  Energy Requirements Based On: Formula  Weight Used for Energy Requirements: Current  Energy (kcal/day): ; 4282-5048  Weight Used for Protein Requirements: Ideal  Protein (g/day): 2.0-2.5 g/kg IBW; 110-135, monitor renal & LFTs  Fluid (ml/day): per critical care    Nutrition Diagnosis:   Inadequate oral intake related to impaired respiratory function as evidenced by NPO or clear liquid status due to medical condition, intubation    Nutrition Interventions:     Nutrition Education/Counseling: No recommendation at this time  Coordination of Nutrition Care: Continue to monitor while inpatient       Goals:        Specify Other Goals: Nutrition Progression if/ when medically appropriate    Nutrition Monitoring and Evaluation:      Food/Nutrient Intake Outcomes: None Identified  Physical Signs/Symptoms Outcomes: Biochemical Data, Nutrition Focused Physical Findings, Skin, GI Status, Fluid Status or Edema, Hemodynamic Status    Discharge Planning:     Too soon to determine     Greer Solano RD, LD  Contact: Ext [phone]

## 2022-07-21 NOTE — ANESTHESIA PRE PROCEDURE
Department of Anesthesiology  Preprocedure Note       Name:  Padmini Mittal   Age:  74 y.o.  :  1950                                          MRN:  [id number]         Date:  2022      Surgeon: Brionna Carney):  Kassy Salazar MD    Procedure: Procedure(s):  EXCISIONAL DEBRIDEMENT NECROTIZING FASCIITIS PERINEUM AND RIGHT BUTTOCK    Medications prior to admission:   Prior to Admission medications    Medication Sig Start Date End Date Taking?  Authorizing Provider   spironolactone (ALDACTONE) 25 MG tablet Take 0.5 tablets by mouth daily 22   Robert Akbar MD   VITAMIN D PO Take 1 tablet by mouth daily Patient takes over the counter gummie    Historical Provider, MD   Cyanocobalamin (VITAMIN B12 PO) Take 1 tablet by mouth daily Patient takes over the counter gummie    Historical Provider, MD   carvedilol (COREG) 25 MG tablet Take 1 tablet by mouth 2 times daily (with meals) 3/7/22   Robert Akbar MD   sacubitril-valsartan (ENTRESTO) 24-26 MG per tablet Take 1 tablet by mouth 2 times daily 3/7/22   Robert Akbar MD   aspirin EC 81 MG EC tablet Take 1 tablet by mouth daily 2/10/22   Jordy Harper MD   rosuvastatin (CRESTOR) 20 MG tablet Take 1 tablet by mouth daily 2/10/22   Jordy Harper MD   isosorbide mononitrate (IMDUR) 30 MG extended release tablet Take 1 tablet by mouth daily 22   Robert Akbar MD   ferrous sulfate 325 (65 Fe) MG tablet Take 325 mg by mouth daily (with breakfast)    Historical Provider, MD   cyclobenzaprine (FLEXERIL) 10 MG tablet Take 10 mg by mouth 3 times daily as needed for Muscle spasms     Historical Provider, MD   vitamin C (ASCORBIC ACID) 500 MG tablet Take 500 mg by mouth daily Patient takes 2 gummies daily    Historical Provider, MD   FLUoxetine (PROZAC) 20 MG capsule Take 20 mg by mouth daily    Historical Provider, MD   insulin glargine (LANTUS) 100 UNIT/ML injection vial Inject 30 Units into the skin nightly     Historical Provider, MD oxyCODONE-acetaminophen (PERCOCET) 7.5-325 MG per tablet Take 1 tablet by mouth every 6 hours as needed for Pain.      Historical Provider, MD       Current medications:    Current Facility-Administered Medications   Medication Dose Route Frequency Provider Last Rate Last Admin    dextrose bolus 10% 125 mL  125 mL IntraVENous PRN Suzi Gurney, DO        Or    dextrose bolus 10% 250 mL  250 mL IntraVENous PRN Suzi Gurney, DO        potassium chloride 10 mEq/100 mL IVPB (Peripheral Line)  10 mEq IntraVENous PRN Suzi Gurney, DO        magnesium sulfate 1000 mg in dextrose 5% 100 mL IVPB  1,000 mg IntraVENous PRN Suzi Gurney, DO        sodium phosphate 10 mmol in sodium chloride 0.9 % 250 mL IVPB  10 mmol IntraVENous PRN Suzi Gurney, DO        Or    sodium phosphate 15 mmol in dextrose 5 % 250 mL IVPB  15 mmol IntraVENous PRN Suzi Gurney, DO        Or    sodium phosphate 20 mmol in dextrose 5 % 500 mL IVPB  20 mmol IntraVENous PRN Suzi Gurney, DO        polyethylene glycol (GLYCOLAX) packet 17 g  17 g Oral Daily PRN Suzi Gurney, DO        0.9 % sodium chloride bolus  15 mL/kg IntraVENous Once Nereyda Torres DO        Followed by   [facility] 0.9 % sodium chloride infusion   IntraVENous Continuous Suzi Gurney, DO        insulin regular (HUMULIN R;NOVOLIN R) 100 Units in sodium chloride 0.9 % 100 mL infusion  0.01-0.5 Units/kg/hr IntraVENous Continuous Suzi Gurney, DO        dextrose 5 % and 0.45 % NaCl with KCl 20 mEq infusion   IntraVENous Continuous PRN Suzi Gurney, DO        norepinephrine (LEVOPHED) 16 mg in dextrose 5% 250 mL infusion  1-100 mcg/min IntraVENous Continuous Suzi Gurney, DO 56.3 mL/hr at 07/20/22 2328 60 mcg/min at 07/20/22 2328    EPINEPHrine (EPINEPHrine HCL) 5 mg in dextrose 5 % 250 mL infusion  1-20 mcg/min IntraVENous Continuous Suzi Gurney, DO 48 mL/hr at 07/20/22 2328 16 mcg/min at 07/20/22 2328    vasopressin 20 Units in dextrose 5 % 100 mL infusion  0.01-0.03 Units/min IntraVENous Continuous Leyla Saucedo DO        regadenoson (LEXISCAN) injection 0.4 mg  0.4 mg IntraVENous ONCE PRN Tre Akbar MD        regadenoson (LEXISCAN) injection 0.4 mg  0.4 mg IntraVENous ONCE PRN Tre Akbar MD         Current Outpatient Medications   Medication Sig Dispense Refill    spironolactone (ALDACTONE) 25 MG tablet Take 0.5 tablets by mouth daily 30 tablet 5    VITAMIN D PO Take 1 tablet by mouth daily Patient takes over the counter gummie      Cyanocobalamin (VITAMIN B12 PO) Take 1 tablet by mouth daily Patient takes over the counter gummie      carvedilol (COREG) 25 MG tablet Take 1 tablet by mouth 2 times daily (with meals) 60 tablet 5    sacubitril-valsartan (ENTRESTO) 24-26 MG per tablet Take 1 tablet by mouth 2 times daily 60 tablet 5    aspirin EC 81 MG EC tablet Take 1 tablet by mouth daily 90 tablet 3    rosuvastatin (CRESTOR) 20 MG tablet Take 1 tablet by mouth daily 90 tablet 3    isosorbide mononitrate (IMDUR) 30 MG extended release tablet Take 1 tablet by mouth daily 30 tablet 3    ferrous sulfate 325 (65 Fe) MG tablet Take 325 mg by mouth daily (with breakfast)      cyclobenzaprine (FLEXERIL) 10 MG tablet Take 10 mg by mouth 3 times daily as needed for Muscle spasms       vitamin C (ASCORBIC ACID) 500 MG tablet Take 500 mg by mouth daily Patient takes 2 gummies daily      FLUoxetine (PROZAC) 20 MG capsule Take 20 mg by mouth daily      insulin glargine (LANTUS) 100 UNIT/ML injection vial Inject 30 Units into the skin nightly       oxyCODONE-acetaminophen (PERCOCET) 7.5-325 MG per tablet Take 1 tablet by mouth every 6 hours as needed for Pain.           Allergies:  No Known Allergies    Problem List:    Patient Active Problem List   Diagnosis Code    Acute blood loss anemia D62    Diabetes mellitus type 2, uncontrolled ([facility] [address] E11.65    GI bleed K92.2    Acute respiratory failure with hypoxia ([facility] J96.01    Chronic back pain M54.9, G89.29    Community acquired pneumonia J18.9    Bacterial pneumonia J15.9    Essential hypertension I10    Tachycardia R00.0    Acute and chronic respiratory failure with hypoxia ([facility]) J96.21    Dilated cardiomyopathy ([facility]) S75.4    Acute systolic CHF (congestive heart failure) ([facility]) I50.21    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    Septic shock ([facility]) A41.9, R65.21       Past Medical History:        Diagnosis Date    Asthma     CHF (congestive heart failure) ([facility])     Diabetes mellitus ([facility])     Diverticular disease     Fibromyalgia muscle pain     Glaucoma     Hypertension     Neuropathy        Past Surgical History:        Procedure Laterality Date    HYSTERECTOMY         Social History:    Social History     Tobacco Use    Smoking status: Never    Smokeless tobacco: Never   Substance Use Topics    Alcohol use: No                                Counseling given: Not Answered      Vital Signs (Current):   Vitals:    07/20/22 2330 07/20/22 2345 07/21/22 0000 07/21/22 0015   BP: (!) 125/55 (!) 95/51 (!) 107/44 (!) 109/43   Pulse: 83 81 80 82   Resp: 27 27 29 23   Temp:       SpO2: 100% 92% 92% 92%   Weight:                                                  BP Readings from Last 3 Encounters:   07/21/22 (!) 109/43   04/27/22 117/73   03/17/22 116/70       NPO Status:                                                                                 BMI:   Wt Readings from Last 3 Encounters:   07/20/22 208 lb (94.3 kg)   04/27/22 208 lb 8 oz (94.6 kg)   03/17/22 187 lb (84.8 kg)     Body mass index is 38.04 kg/m².     CBC:   Lab Results   Component Value Date/Time    WBC 34.2 07/20/2022 04:13 PM    RBC 3.70 07/20/2022 04:13 PM    HGB 9.3 07/20/2022 04:13 PM    HCT 29.4 07/20/2022 04:13 PM    MCV 79.5 07/20/2022 04:13 PM    RDW 14.2 07/20/2022 04:13 PM     07/20/2022 04:13 PM       CMP:   Lab Results   Component Value Date/Time     07/20/2022 07:45 PM    K 4.8 07/20/2022 07:45 PM    K 4.9 07/20/2022 07:45 PM    CL 93 07/20/2022 07:45 PM    CO2 16 07/20/2022 07:45 PM    BUN 68 07/20/2022 07:45 PM    CREATININE 3.7 07/20/2022 07:45 PM    GFRAA 14 07/20/2022 07:45 PM    LABGLOM 14 07/20/2022 07:45 PM    GLUCOSE 345 07/20/2022 07:45 PM    GLUCOSE 320 11/16/2010 09:10 AM    PROT 6.4 07/20/2022 07:45 PM    CALCIUM 8.8 07/20/2022 07:45 PM    BILITOT 1.1 07/20/2022 07:45 PM    ALKPHOS 199 07/20/2022 07:45 PM     07/20/2022 07:45 PM    ALT 48 07/20/2022 07:45 PM       POC Tests:   Recent Labs     07/20/22  1534   POCGLU 194*   POCNA 140   POCK 3.8   POCCL 108       Coags:   Lab Results   Component Value Date/Time    PROTIME 14.1 10/24/2019 03:10 AM    INR 1.2 10/24/2019 03:10 AM       HCG (If Applicable): No results found for: PREGTESTUR, PREGSERUM, HCG, HCGQUANT     ABGs: No results found for: PHART, PO2ART, GQL8TPC, PID9ZFP, BEART, B4CNWFDZ     Type & Screen (If Applicable):  No results found for: LABABO, LABRH    Drug/Infectious Status (If Applicable):  No results found for: HIV, HEPCAB    COVID-19 Screening (If Applicable):   Lab Results   Component Value Date/Time    COVID19 Not Detected 07/20/2022 04:13 PM           Anesthesia Evaluation  Patient summary reviewed   history of anesthetic complications (Per daughter, difficult intubation): history of difficult intubation. Airway: Mallampati: III  TM distance: >3 FB   Neck ROM: full  Mouth opening: > = 3 FB   Dental:          Pulmonary: breath sounds clear to auscultation  (+) asthma:                           ROS comment: CXR 7/2022:  FINDINGS:  The lungs are without acute focal process.  There is no effusion or  pneumothorax.  Cardiomegaly 10/20/2019.  The osseous structures are without  acute process.        Cardiovascular:    (+) hypertension:, valvular problems/murmurs (Mild TR):, CAD: obstructive, CHF: systolic,       ECG reviewed  Rhythm: regular  Rate: normal  Echocardiogram reviewed  Stress test reviewed  Cleared by cardiology           ROS comment: EKG 7/2022:  Normal sinus rhythm  Possible Left atrial enlargement  Septal infarct , age undetermined  Possible Lateral infarct , age undetermined  Abnormal ECG  No previous ECGs available    Stress Test 3/2022:  Impression:    1. ECG during the infusion did not change. 2. The myocardial perfusion imaging was abnormal.    The abnormality was a a large sized fixed defect in the apical wall suggestive of a prior MI  3. Overall left ventricular systolic function was normal with wall motion abnormality. 4. Low risk general pharmacologic stress test.    Echo 8/2021:  Summary   Moderate to severe proximal septal wall thickness (1.5 to 1.6cm). Consider   cardiac MRI for further evaluation if none has been done recently. Akinetic apex   Ejection fraction is visually estimated at 48% by Albarran's method. Mild RVH   Normal right ventricular size and function. Mild tricuspid regurgitation. Neuro/Psych:   Negative Neuro/Psych ROS              GI/Hepatic/Renal:   (+) renal disease: ARF and CRI,           Endo/Other:    (+) DiabetesType II DM, poorly controlled, using insulin, . Abdominal:             Vascular: negative vascular ROS.          Other Findings:        Cardiology evaluation 1/2021:  \"  Diagnosis    Acute blood loss anemia    Diabetes mellitus type 2, uncontrolled (HCC)    GI bleed    Acute respiratory failure with hypoxia (HCC)    Chronic back pain    Community acquired pneumonia    Bacterial pneumonia    Essential hypertension    Tachycardia    Acute and chronic respiratory failure with hypoxia (HCC)    Acute pulmonary edema (HCC)    Dilated cardiomyopathy (HCC)    Acute systolic CHF (congestive heart failure) ([facility] [address]      Patient with ischemic cardiomyopathy with cath 2019 demonstrating occluded LAD, but no other obstructive disease, and a dense wall motion abnormality in LAD territory on V-gram.  Her most recent echo demonstrates similar findings, with an EF to my estimate at 40%  She did not tolerate upward titration of Entresto  She is euvolemic today on ARNI, full dose carvedilol, and MRA  Recent renal function and K acceptable on present meds  She should also be on a low dose ASA and high intensity statin - I cannot find out from chart why not  \"       Anesthesia Plan      general     ASA 4 - emergent       Induction: intravenous and rapid sequence. MIPS: Postoperative opioids intended and Prophylactic antiemetics administered. Anesthetic plan and risks discussed with patient. Use of blood products discussed with patient whom. Plan discussed with CRNA. Patient's daughter consents to intubation and possible postoperative mechanical ventilation on behalf of patient. Patient's daughter consents to ACLS and wishes patient to be FULL CODE intraoperatively. Received 8 units IV insulin at 11:49 for blood glucose of 345.       Zane Kahn MD   7/21/2022

## 2022-07-21 NOTE — PROGRESS NOTES
Surgical Intensive Care Unit  Daily Progress Note  Date of admission:  7/20/2022  Reason for ICU transfer:  Bakari's gangrene    Subjective:  Patient came to SICU post-operatively, follows commands. States she does not have any pain this morning. Hospital Course:  7/21: Admitted with Bakari's. Taken to OR for excisional debridement. On multiple pressors. Admitted to SICU post-op. Glucose remains uncontrolled, started on DKA protocol. Physical Exam:  BP (!) 134/47   Pulse 66   Temp 98.1 °F (36.7 °C) (Axillary)   Resp 29   Ht 5' 4\" (1.626 m)   Wt 217 lb (98.4 kg)   SpO2 100%   BMI 37.25 kg/m²     CONSTITUTIONAL:  Intubated, responsive and follows commands  EYES:  Lids and lashes normal, pupils equal, round and reactive to light, extra ocular muscles intact, sclera clear, conjunctiva normal  NECK:  supple, symmetrical, trachea midline  LUNGS:  On mechanical ventilation  CARDIOVASCULAR:  RR and normotensive on multiple pressors  ABDOMEN:  Soft, no grimace to palpation, nondistended  SKIN:  Large wound to right perineum and labia with heavy drainage pack, foul smelling but no sushma purulence    ASSESSMENT / PLAN:  Neuro: Acute pain secondary to surgery, pain control PRN  CV: Septic shock, continue IVFs and pressors, wean as able. Elevated BNP secondary to CHF, hold home meds for now. Elevated troponin, likely demand ischemia, monitor. Hx HLD, continue Crestor  Pulm: Acute hypoxemic respiratory failure, on mechanical ventilation, wean as able  GI: Elevated LFTs likely secondary to hypotension, monitor  Renal: TOMÁS, urine lytes pre-renal, continue IVFs, monitor UOP and Cr  ID:       Bakari's s/p excisional debridement, continue Vanc/Zosyn and Clinda  Endo: DKA, continue DKA protocol.    MSK: Large perineal wound, continue local wound care, takeback timing TBD likely 7/22     Bowel regime: Glycolax   Pain control/Sedation: Tylenol, Precedex, Fentanyl gtt   DVT prophylaxis: SCDs, heparin   GI: Pepcid

## 2022-07-21 NOTE — PROCEDURES
Abdulkadir Boudreaux is a 74 y.o. female patient. 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 Septic shock ([address]    3. Lactic acidosis    4. Acute renal failure, unspecified acute renal failure type ([address]    5. Acute on chronic combined systolic and diastolic CHF (congestive heart failure) ([address]    6. Diabetic ketoacidosis without coma associated with type 2 diabetes mellitus ([address]    7. Necrotizing fasciitis ([address]    8. TOMÁS (acute kidney injury) ([address]    9. Elevated LFTs    10. Bakari's disease    11. Acute systolic CHF (congestive heart failure) ([address]    12. Dilated cardiomyopathy ([address]    13. Acute and chronic respiratory failure with hypoxia (HCC)    14. Tachycardia    15. Essential hypertension    16. Bacterial pneumonia    17. Acute respiratory failure with hypoxia (HCC)    18. Acute blood loss anemia      Past Medical History:   Diagnosis Date    Asthma     CHF (congestive heart failure) (HCC)     Diabetes mellitus ([address]     Diverticular disease     Fibromyalgia muscle pain     Glaucoma     Hypertension     Neuropathy      Blood pressure (!) 134/47, pulse 79, temperature 98.1 °F (36.7 °C), temperature source Axillary, resp. rate 28, height 5' 4\" (1.626 m), weight 217 lb (98.4 kg), SpO2 100 %. Insert Arterial Line    Date/Time: 7/21/2022 3:54 AM  Performed by: Debora Shin MD  Authorized by: Debora Shin MD   Consent: The procedure was performed in an emergent situation. Patient identity confirmed: arm band  Time out: Immediately prior to procedure a \"time out\" was called to verify the correct patient, procedure, equipment, support staff and site/side marked as required. Preparation: Patient was prepped and draped in the usual sterile fashion.   Indications: multiple ABGs, respiratory failure and hemodynamic monitoring  Location: right radial  Needle gauge: 20  Number of attempts: 2  Post-procedure: line sutured and dressing applied  Patient tolerance: patient tolerated the procedure well with no immediate complications  Comments: Dr. Yolie Rob was immediately available during the entire procedure.         Viraj Adams MD  7/21/2022

## 2022-07-21 NOTE — OP NOTE
Operative Note      Patient: Chris Post  YOB: 1950  MRN: [id number]    Date of Procedure: 7/21/2022    Pre-Op Diagnosis: NECROTIZING FASCIITIS PERINEUM AND RIGHT BUTTOCK    Post-Op Diagnosis: Same       Procedure(s):  EXCISIONAL DEBRIDEMENT NECROTIZING FASCIITIS PERINEUM AND RIGHT BUTTOCK  Perineum wound 18cm L x 9cm W x 7cm D, involving skin, subcutaneous tissue, down to level of fascia and pubic bone  Gluteal wound 9cm L x 4.5cm W x 10cm D    Surgeon(s):  Michael Romano MD    Assistant:   * No surgical staff found *    Anesthesia: General    Estimated Blood Loss (mL): 577     Complications: None    Specimens:   ID Type Source Tests Collected by Time Destination   1 : Perineum culture Tissue Perineum CULTURE, ANAEROBIC, GRAM STAIN, CULTURE, SURGICAL Michael Romano MD 7/21/2022 2701    A : infected perineal tissue Tissue Perineum SURGICAL PATHOLOGY Michael Romano MD 7/21/2022 0158        Implants:  * No implants in log *      Drains:   Urinary Catheter (Active)   Catheter Indications Prolonged immobilization (e.g. unstable thoracic or lumbar spine, multiple traumatic injuries such as pelvic fractures) 07/20/22 1754   Urine Color Yellow 07/20/22 1754   Output (mL) 30 mL 07/21/22 0225       Findings: necrotizing soft tissue infection of right perineum and right buttocks  Perineum wound 18cm L x 9cm W x 7cm D, involving skin, subcutaneous tissue, down to level of fascia and pubic bone  Gluteal wound 9cm L x 4.5cm W x 10cm D    History: This is a 66yo female with PMH of HTN, DM, CHF, cardiomyopathy who presented with AMS and was found to have septic shock and necrotizing soft tissue infection of perineum and right buttock. Excisional debridement of perineum and right buttock was recommend. All the risks, benefits and alternatives were discussed including need for return to OR, need for mechanical ventilation, problems with wound healing, bleeding, long term antibiotics, perioperative MI and death. Patient and daughter, Jaye Sánchez, understand all of the above and wish to proceed with surgery. DESCRIPTION OF PROCEDURE: The patient was identified and the procedure was confirmed. The surgical checklist was reviewed and agreed upon by all present. The patient was positioned in the supine low lithotomy position on the operating table. Anesthesia was obtained prior anesthesia record without complication. The perineum, lower abdomen and buttocks were prepped and draped in the usual sterile fashion. Using bovie electrocautery, skin incision was made over the greatest area of soft tissue induration on the right labia extending into the perineum and right buttocks. Purulent drainage was noted coming from the right perineum and swab cultures were collected and sent for aerobic and anaerobic cultures. Necrotic tissue and dishwater foul-smelling drainage was encountered. The necrotic tissue was debrided with Bovie electrocautery down to the level of the right pubic bone. The gluteal wound was felt to track cephalad and this was opened up and drained of foul-smelling fluid. Ischio rectal fat was necrotic appearing and subsequently debrided. A digital rectal exam was performed at this point to ensure that there was no injury to the rectum or connection between the rectum and the infected cavity of the right buttock. No injury or connection to the rectum were identified. The wound was debrided of all necrotic tissue with a margin of healthy appearing soft tissue. Hemostasis was achieved with Bovie electrocautery. The wound was irrigated with copious amounts of sterile saline until irrigant was more clear. No further areas of bleeding were noted. Dressing of saline moistened Kerlix x3 were packed into the wound and dry dressing of heavy drainage pack was placed and secured with mesh underwear. Needle, sponge and instrument counts were reported as correct x2.  The patient tolerated the procedure and was transferred to the surgical ICU in stable condition. She remains intubated and on vasopressors which were both present preoperatively. Dr. Yolie Rob was present and scrubbed for procedure    Continue antibiotics  Anticipate return to the OR in the next 24 to 48 hours for repeat exploration of wound, possible further debridement, possible wound VAC. Due to extent of wound it is possible that patient may need consideration for diverting ostomy.     Electronically signed by Migel Johns DO on 7/21/2022 at 2:35 AM

## 2022-07-21 NOTE — PLAN OF CARE
Problem: Respiratory - Adult  Goal: Achieves optimal ventilation and oxygenation  Outcome: Progressing  Flowsheets (Taken 7/21/2022 2246)  Achieves optimal ventilation and oxygenation:   Position to facilitate oxygenation and minimize respiratory effort   Respiratory therapy support as indicated   Oxygen supplementation based on oxygen saturation or arterial blood gases

## 2022-07-21 NOTE — CONSULTS
GENERAL SURGERY  CONSULT NOTE  7/21/2022    Physician Consulted: Dr. Yolie Rob  Reason for Consult: lavonne's gangrene  Referring Physician: Dr. Elva Vail    Chief Complaint   Patient presents with    Altered Mental Status     PER EMS INITIAL CALL WAS FOR HYPERGLYCMIA PT ARRIVED UNRESPONSIVE TO ER. HPI  Padmini Mittal is a 74 y.o. female with PMH of  type 2 diabetes, CHF with an EF of 45 to 50%, cardiomyopathy who presents with approx 10 days of perineal pain and right buttock pain. She presented today by EMS for AMS and unresponsiveness. Apparently she was sent here for hypoglycemia. EMS reports that her blood sugar was in the 300s upon their arrival and she was responsive but in route she became unresponsive. Presently on vasopressors. Hyponatremic. Hypotensive on arrival, additionally had bradycardia. Afebrile. TOMÁS, glucose >300. CT a/p with evidence of gas in the soft tissues c/w necrotizing fasciitis of right buttocks, perineum, mons pubis extending to lower abdominal wall    At time of my exam patient was alert and oriented and able to participate in history and exam.  Chart review shows limited code with no intubation however she does wish to proceed with surgery and states she is ok with intubation for surgery and mechanical ventilation postop if necessary. Past Medical History:   Diagnosis Date    Asthma     CHF (congestive heart failure) (HCC)     Diabetes mellitus ([facility] [address]     Diverticular disease     Fibromyalgia muscle pain     Glaucoma     Hypertension     Neuropathy        Past Surgical History:   Procedure Laterality Date    HYSTERECTOMY         Medications Prior to Admission    Prior to Admission medications    Medication Sig Start Date End Date Taking?  Authorizing Provider   spironolactone (ALDACTONE) 25 MG tablet Take 0.5 tablets by mouth daily 4/6/22   Tre Akbar MD   VITAMIN D PO Take 1 tablet by mouth daily Patient takes over the counter gummie    Historical Provider, MD   Cyanocobalamin (VITAMIN B12 PO) Take 1 tablet by mouth daily Patient takes over the counter gummie    Historical Provider, MD   carvedilol (COREG) 25 MG tablet Take 1 tablet by mouth 2 times daily (with meals) 3/7/22   Tani Akbar MD   sacubitril-valsartan (ENTRESTO) 24-26 MG per tablet Take 1 tablet by mouth 2 times daily 3/7/22   Tani Akbar MD   aspirin EC 81 MG EC tablet Take 1 tablet by mouth daily 2/10/22   Joni Moore MD   rosuvastatin (CRESTOR) 20 MG tablet Take 1 tablet by mouth daily 2/10/22   Joni Moore MD   isosorbide mononitrate (IMDUR) 30 MG extended release tablet Take 1 tablet by mouth daily 1/26/22   Tani Akbar MD   ferrous sulfate 325 (65 Fe) MG tablet Take 325 mg by mouth daily (with breakfast)    Historical Provider, MD   cyclobenzaprine (FLEXERIL) 10 MG tablet Take 10 mg by mouth 3 times daily as needed for Muscle spasms     Historical Provider, MD   vitamin C (ASCORBIC ACID) 500 MG tablet Take 500 mg by mouth daily Patient takes 2 gummies daily    Historical Provider, MD   FLUoxetine (PROZAC) 20 MG capsule Take 20 mg by mouth daily    Historical Provider, MD   insulin glargine (LANTUS) 100 UNIT/ML injection vial Inject 30 Units into the skin nightly     Historical Provider, MD   oxyCODONE-acetaminophen (PERCOCET) 7.5-325 MG per tablet Take 1 tablet by mouth every 6 hours as needed for Pain.      Historical Provider, MD       No Known Allergies    Family History   Problem Relation Age of Onset    Kidney Disease Mother     Heart Failure Mother     Other Father         aneurysm    Other Sister         scleroderma    Cancer Brother         throat       Social History     Tobacco Use    Smoking status: Never    Smokeless tobacco: Never   Vaping Use    Vaping Use: Never used   Substance Use Topics    Alcohol use: No    Drug use: No         Review of Systems:   General ROS: malaise, weakness, fatigue  Hematological and Lymphatic ROS: chronic anemia  Respiratory ROS: denies dyspnea  Cardiovascular ROS: no chest pain or dyspnea on exertion  Hx CHF, cardiomyopathy  Gastrointestinal ROS: no abdominal pain, change in bowel habits, or black or bloody stools  Genito-Urinary ROS: pain of mons and right labia x 10d  Musculoskeletal ROS: negative      PHYSICAL EXAM:    Vitals:    07/21/22 0015   BP: (!) 109/43   Pulse: 82   Resp: 23   Temp:    SpO2: 92%       GENERAL:  calm cooperative,  A&Ox3. HEAD:  Normocephalic. Atraumatic. EYES:   No scleral icterus. PERRL. LUNGS:  No increased work of breathing. CARDIOVASCULAR: Regular rate  ABDOMEN:  Soft, non-distended, +RLQ TTP. No guarding, rigidity, rebound. :  induration and tenderness of mons and right labia. Extending to right buttock  EXTREMITIES:   MAEx4. Atraumatic. No LE edema. SKIN:  tenderness and induration of mons, right buttock, right labia concerning for NSTI  NEUROLOGIC:  no focal deficits, A&O x 3. LABS:    CBC  Recent Labs     07/20/22  1613   WBC 34.2*   HGB 9.3*   HCT 29.4*        BMP  Recent Labs     07/20/22 1945   *   K 4.8  4.9   CL 93*   CO2 16*   BUN 68*   CREATININE 3.7*   CALCIUM 8.8     Liver Function  Recent Labs     07/20/22 1945   BILITOT 1.1   *   ALT 48*   ALKPHOS 199*   PROT 6.4   LABALBU 2.3*     No results for input(s): LACTATE in the last 72 hours. No results for input(s): INR, PTT in the last 72 hours. Invalid input(s): PT    RADIOLOGY    CT ABDOMEN PELVIS WO CONTRAST Additional Contrast? None    Result Date: 7/20/2022  EXAMINATION: CT OF THE ABDOMEN AND PELVIS WITHOUT CONTRAST 7/20/2022 9:24 pm TECHNIQUE: CT of the abdomen and pelvis was performed without the administration of intravenous contrast. Multiplanar reformatted images are provided for review. Automated exposure control, iterative reconstruction, and/or weight based adjustment of the mA/kV was utilized to reduce the radiation dose to as low as reasonably achievable.  COMPARISON: results were called by Dr. Ondina Medina MD to Dr. Ventura Rizzo on 7/20/2022 at 22:22. CT HEAD WO CONTRAST    Result Date: 7/20/2022  EXAMINATION: CT OF THE HEAD WITHOUT CONTRAST  7/20/2022 9:24 pm TECHNIQUE: CT of the head was performed without the administration of intravenous contrast. Automated exposure control, iterative reconstruction, and/or weight based adjustment of the mA/kV was utilized to reduce the radiation dose to as low as reasonably achievable. COMPARISON: None. HISTORY: ORDERING SYSTEM PROVIDED HISTORY: altered TECHNOLOGIST PROVIDED HISTORY: Has a \"code stroke\" or \"stroke alert\" been called? ->No Reason for exam:->altered Decision Support Exception - unselect if not a suspected or confirmed emergency medical condition->Emergency Medical Condition (MA) What reading provider will be dictating this exam?->CRC FINDINGS: BRAIN/VENTRICLES: No mass effect, edema or hemorrhage is seen. Mild volume loss is seen in the cerebrum with mild chronic microvascular ischemic changes. No hydrocephalus or extra-axial fluid is seen. ORBITS: The visualized portion of the orbits demonstrate no acute abnormality. SINUSES: Mild mucosal thickening in the ethmoid sinuses. The mastoids are clear. SOFT TISSUES/SKULL:  No acute abnormality of the visualized skull or soft tissues. Small sessile osteoma along the vertex, situated to the right of midline. No acute intracranial abnormality. XR CHEST PORTABLE    Result Date: 7/20/2022  EXAMINATION: ONE XRAY VIEW OF THE CHEST 7/20/2022 4:05 pm COMPARISON: None. HISTORY: ORDERING SYSTEM PROVIDED HISTORY: altered TECHNOLOGIST PROVIDED HISTORY: Reason for exam:->altered What reading provider will be dictating this exam?->CRC FINDINGS: The lungs are without acute focal process. There is no effusion or pneumothorax. Cardiomegaly 10/20/2019. The osseous structures are without acute process. No acute process. Cardiomegaly. ASSESSMENT/PLAN:  74 y.o. female with NSTI of perineum, R buttocks, mons pubis, lower abdominal wall with septic shock    - NPO  - Vanc, Zosyn, Clinda  - OR for excisional debridement of perineum, R buttocks, mons pubis, possible lower abdominal wall. Discussed at length with patient the severity of her infection and that without surgery she will not survive. We discussed that given her medical comorbidities she does have risk of cardiac event perioperatively. Explained that she will have a large wound requiring frequent dressing changes, likely will need repeat surgical debridement(s), ICU stay, prolonged antibiotics. Patient understands risks benefits and alternatives of procedure and wishes to proceed. Also called her daughter, Hudson Smith, who understands above and also agrees for proceeding with procedure for patient. She understands this will require general anesthesia and intubation. Plan discussed with Dr. Sherren Makua.     Maykel Ragsdale DO  Resident, PGY-4  7/21/2022  12:48 AM

## 2022-07-22 NOTE — PLAN OF CARE
Problem: Respiratory - Adult  Goal: Achieves optimal ventilation and oxygenation  7/22/2022 1545 by Dar Michaud RCP  Outcome: Progressing  7/22/2022 1159 by Dar Michaud RCP  Outcome: Progressing  7/22/2022 0853 by Anny Lewis RN  Outcome: Progressing

## 2022-07-22 NOTE — PROGRESS NOTES
[facility]  SURGICAL INTENSIVE CARE UNIT    CRITICAL CARE ATTENDING PROGRESS NOTE    I have examined the patient, reviewed the record, and discussed the case with the APN/  Resident. I have reviewed all relevant labs and imaging data. Please refer to the  APN/ resident's note. I agree with the  assessment and plan with the following corrections/ additions. The following summarizes my clinical findings and independent assessment. CC: Septic shock after NSTI    HOSPITAL COURSE:  7/21 excisional debridement of perineum and right buttock by Dr. Giovana Narvaez. Patient remains on 2 pressors and insulin drip  7/22 remains on 2 pressors vasopressin and Levophed. Also remains on insulin drip. 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 *       PLAN:  Sedation/ Pain: Continue Precedex and fentanyl drip    CV: Septic shock-weaned from 3 pressors to 2 pressors. Remains on Levophed at 25 mics per minute and vasopressin at 0.04 units/min. Titrate for MAP greater than 65 wean pressors as able. Pulmonary: Acute respiratory failure-continue full vent support    GI: N.p.o.  Status postdebridement of perineum genitalia on 7/21 for necrotizing soft tissue infection  Take back plan for Saturday, July 23    FEN: Acute renal injury-continue IV fluids. Continue bicarb drip  Lactic acidosis resolved.   Overall resuscitation is improving  Continue bicarb drip at 150 cc/h  Patient is now oliguric urine output has worsened-227 cc over 24 hours    ID: Continue Zosyn, clinda, vancomycin for necrotizing soft tissue infection. Await culture    Heme: Monitor CBC    Endo: Monitor Blood Sugars. Target blood glucose less than 180 in the ICU. Continue insulin drip. Patient is a poorly controlled diabetic. Her hemoglobin A1c is 9.9. Diabetic ketoacidosis protocol    DVT prophylaxis--SCDS, heparin 3 times daily  GI Prophylaxis--Protonix  Lines--arterial line, central line 7/21  CODE: Limited code-no intubation/reintubation, okay for defib cardioversion chest compression recessive medications     DISPOSITION-Continue ICU Care    Critical care time exclusive of teaching and procedures = 35min     I provided critical care to a patient with septic shock and diabetic ketoacidosis in the setting of necrotizing soft tissue infection, multisystem organ failure requiring frequent and emergent imaging, lab studies, intensive monitoring, data review, and adjusting the clinical plan as well as urgent coordination with multiple specialists. Pt needs continuous ICU monitoring because the patient is at risk for deterioration from a multisystem organ failure standpoint    I have updated patient's daughter and brother at bedside. I explained to them that she is a multisystem organ failure and critically ill. However I state that she is taking baby steps in the correct direction and slowly improving. Informed the blood sugars are improving the number of pressor required have been decreased and that the tentative plan will be for a takeback to the operating room either Friday or Saturday    Marlise Ormond, MD, [facility]  7/22/2022  7:28 AM    NOTE: This report was transcribed using voice recognition software. Every effort was made to ensure accuracy; however, inadvertent computerized transcription errors may be present.

## 2022-07-22 NOTE — FLOWSHEET NOTE
When unrestrained, patient attempts to pull at ETT and other life saving lines. Placing restraints for patient safety.

## 2022-07-22 NOTE — PROGRESS NOTES
Pharmacy Consultation Note  (Antibiotic Dosing and Monitoring)    Initial consult date: 7/21/22  Consulting physician/provider: Dr Anthony Stinson  Drug: Vancomycin  Indication: NSTI    Age/  Gender Height Weight IBW  Allergy Information   72 y.o./female 5' 4\" (162.6 cm) 208 lb (94.3 kg)     Ideal body weight: 54.7 kg (120 lb 9.5 oz)  Adjusted ideal body weight: 73.5 kg (162 lb 1.8 oz)   Patient has no known allergies. Renal Function:  Recent Labs     07/21/22  2215 07/22/22  0200 07/22/22  0600   BUN 61* 60* 64*   CREATININE 3.1* 3.1* 3.3*         Intake/Output Summary (Last 24 hours) at 7/22/2022 0729  Last data filed at 7/22/2022 0657  Gross per 24 hour   Intake 9584.81 ml   Output 627 ml   Net 8957.81 ml         Vancomycin Monitoring:  Trough:  No results for input(s): VANCOTROUGH in the last 72 hours. Random:    Recent Labs     07/22/22  0005   VANCORANDOM 16.1       Vancomycin Administration Times:  Recent vancomycin administrations                     vancomycin (VANCOCIN) 2,000 mg in dextrose 5 % 500 mL IVPB (mg) 2,000 mg New Bag 07/20/22 2251                    Assessment:  Patient is a 74 y.o. female who has been initiated on vancomycin  Estimated Creatinine Clearance: 17 mL/min (A) (based on SCr of 3.3 mg/dL (H)). To dose vancomycin, pharmacy will be utilizing dosing based off of levels because of patient's renal impairment/insufficiency  Vancomycin random 16.1 mcg/mL overnight    Plan:   Will order Vancomycin 1500 mg IV x1 dose today  Will check vancomycin random level with AM labs tomorrow  Will continue to monitor renal function   Clinical pharmacy to follow    Dunia Cervantes PharmD, BCPS, BCCCP  7/22/2022  7:30 AM

## 2022-07-22 NOTE — PLAN OF CARE
Problem: Respiratory - Adult  Goal: Achieves optimal ventilation and oxygenation  7/22/2022 1159 by Sneha Leyva RCP  Outcome: Progressing  7/22/2022 0853 by Filiberto Gutierrez RN  Outcome: Progressing  7/22/2022 0028 by Theresa Momin RN  Outcome: Progressing

## 2022-07-22 NOTE — PROGRESS NOTES
Hospitalist Progress Note      Synopsis: Patient admitted for blood sugar of 305, increased abdominal pain and \"lethargy\" that occurred suddenly yesterday afternoon. Patient states that she has been experiencing low back/hip pain for the last month that is moderate in severity, worsened and relieved by nothing. Additionally, patient complains of abdominal pain that has been worsening over the last week. She was scheduled to go to her PCP on day of complaint but was too weak to leave her home. Patient denies chest pain, shortness of breath, fever, aches, chills. Patient was hypotensive upon arrival to the ED and was started on Levophed and epinephrine. Patient does have history of CHF with EF of 45 to 50%, diverticulitis and diabetes. 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 Surgery was consulted and patient was taken to surgery for excisional debridement of perineum, right buttocks, mons pubis, and possible lower abdominal wall. Patient was admitted to medicine for further observation and management of sepsis. Course significant for BMP with an decreased sodium of 131, decreased chloride at 93, decreased CO2 at 16, increased BUN at 68, increased creatinine to 3.7 and elevated anion gap at 22. Lactic acid was elevated at 3.6  Troponin on presentation was 47 and subsequently raised to 54. GI liver profile significant for decreased albumin at 2.3 elevated alk phos at 199, ALT elevated at 48, AST elevated at 113 and the patient's blood glucose level was 345  CBC significant for elevated WBC at 34.2, hemoglobin decreased at 9.3, and hematocrit 29.4  Blood cultures were drawn in ER  Urinalysis was positive for glucose with a moderate amount of blood and protein appreciated. Leukocyte esterase was negative with many bacteria seen.   Initial ABGs showed pH of 7.332, CO2 33.5 and bicarb 17.3  An EKG showed normal sinus rhythm, with possible left atrial enlargement, septal infarct age undetermined, possible lateral infarct age undetermined. A CT of the head was performed and showed no acute intercranial abnormality. 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 Chest x-ray showed no acute process and cardiomegaly      Hospital day 1     Subjective:  Patient was assessed at bedside, sedated, intubated, mechanical ventilation in place. Patient able to open eyes and track movement to verbal stimuli. Per nursing staff, surgery plans further debridement of fourniers gangrene tomorrow . Kidney fx improving mildly. Continue DKA protocol  Monitor Hgb 7.5 today. Infuse for <7  Cultures pending  Records reviewed. Temp (24hrs), Av.3 °F (36.3 °C), Min:96.8 °F (36 °C), Max:98 °F (36.7 °C)    DIET: Diet NPO Exceptions are: Sips of Water with Meds  CODE: Limited    Intake/Output Summary (Last 24 hours) at 2022 1143  Last data filed at 2022 1100  Gross per 24 hour   Intake 9272.98 ml   Output 637 ml   Net 8635.98 ml       Review of Systems:    Unable to be completed due to sedation    Objective:    BP (!) 107/51   Pulse 64   Temp 97.3 °F (36.3 °C) (Axillary)   Resp 18   Ht 5' 4\" (1.626 m)   Wt 224 lb 6.4 oz (101.8 kg)   SpO2 97%   BMI 38.52 kg/m²     General appearance: Intubated, sedated, opens eyes to voice  HEENT: Conjunctivae/corneas clear. Mucous membranes moist.  Neck: Supple. No JVD. Respiratory:  mechanical ventilation   Cardiovascular:  RRR. S1, S2 without MRG. PV: Pulses palpable. No edema. Abdomen: Soft, non-tender, non-distended. +BS  Musculoskeletal: No obvious deformities.    Skin: fourniers gangrene to right perineum and labia with dressing in place      Medications:  REVIEWED DAILY    Infusion Medications    sodium chloride      vasopressin (Septic Shock) infusion 0.04 Units/min (07/22/22 0625)    fentaNYL 100 mcg/hr (07/22/22 1137)    dexmedetomidine (PRECEDEX) IV infusion 0.2 mcg/kg/hr (07/22/22 0146)    insulin 0.192 Units/kg/hr (07/22/22 1109)    sodium bicarbonate infusion 150 mL/hr at 07/22/22 0920    norepinephrine 25 mcg/min (07/22/22 0625)     Scheduled Medications    calcium gluconate IVPB  3,000 mg IntraVENous Once    sodium chloride flush  5-40 mL IntraVENous 2 times per day    heparin (porcine)  5,000 Units SubCUTAneous 3 times per day    acetaminophen  650 mg Oral Q6H    polyethylene glycol  17 g Oral Daily    FLUoxetine  20 mg Oral Daily    chlorhexidine  15 mL Mouth/Throat BID    polyvinyl alcohol  1 drop Both Eyes Q4H    And    artificial tears   Both Eyes Q4H    clindamycin (CLEOCIN) IV  900 mg IntraVENous Q8H    piperacillin-tazobactam  4,500 mg IntraVENous Q12H    vancomycin (VANCOCIN) intermittent dosing (placeholder)   Other RX Placeholder    pantoprazole (PROTONIX) 40 mg injection  40 mg IntraVENous Daily    rosuvastatin  10 mg Oral Daily     PRN Meds: sodium chloride flush, sodium chloride, ondansetron **OR** ondansetron, dextrose bolus **OR** dextrose bolus, potassium chloride, magnesium sulfate    Labs:     Recent Labs     07/21/22  0240 07/21/22  0615 07/22/22  0600   WBC 16.8* 11.3 14.3*   HGB 8.1* 8.0* 7.5*   HCT 25.3* 24.5* 22.6*    142 123*       Recent Labs     07/22/22  0200 07/22/22  0600 07/22/22  1000   * 125* 126*   K 4.4 4.9  4.9 4.3   CL 95* 92* 94*   CO2 18* 17* 18*   BUN 60* 64* 61*   CREATININE 3.1* 3.3* 3.1*   CALCIUM 6.3* 6.7* 6.3*   PHOS 3.7 3.8 3.7       Recent Labs     07/21/22  0240 07/21/22  0615 07/22/22  0600   PROT 5.0* 4.8* 4.7*   ALKPHOS 144* 138* 144*   ALT 39* 42* 52*   AST 76* 84* 112*   BILITOT 1.0 1.0 1.0       No results for input(s): INR in the last 72 hours.     No results for input(s): Michela Mould in the last 72 hours. Chronic labs:    Lab Results   Component Value Date    TSH 0.260 (L) 10/21/2019    INR 1.2 10/24/2019    LABA1C 9.9 (H) 07/21/2022       Radiology: REVIEWED DAILY    Assessment & Plan:  Assessment:  Septic shock, lactic acid on presentation 7.4, now 2.1  Bakari's gangrene, CT of the abdomen and pelvis showed soft tissue gas along the right perineum extending into the anterior lower abdominal wall and right gluteal region  Diabetic ketoacidosis, anion gap 22 --->14  Congestive heart failure with EF of 45 to 50% on echo performed in August 2021  History of diverticular disease  Hypertension  History of glaucoma  Acute kidney injury, creatinine 3.1 baseline seems to be 1.2-1.4        Plan:  Vasopressors to keep MAP greater than 65  OR for excisional debridement of perineum, right buttocks, mons pubis, possible lower abdominal wall  Continue IV antibiotics Vanco, Zosyn, clinda  Patient on insulin drip  Levophed and vasopressin drip for MAP >65  NPO  insulin drip  Trend lactic acid  Check procalcitonin  Urine studies to determine cause of TOMÁS  Follow BMP, CBC  Following critical care  Surgical debridement 7/21, per chart scheduled for second debridement 7/23      DVT Prophylaxis [] Lovenox  [x]  Heparin [] DOAC [] PCDs [] Ambulation    GI Prophylaxis [x] PPI  [] H2 Blocker   [] Carafate  [] Diet/Tube Feeds   Level of care [] Med/Surg  [] Intermediate  [x]  ICU   Diet Diet NPO Exceptions are: Sips of Water with Meds    Family contact [x]  N/A    [] At bedside  [] Phone call     Discharge Plan: Patient remains in critical care. Will assess after patient improves and is downgraded    +++++++++++++++++++++++++++++++++++++++++++++++++  CHRISTI Silveira/ [place] [id number], [place]  +++++++++++++++++++++++++++++++++++++++++++++++++  NOTE: This report was transcribed using voice recognition software.  Every effort was made to ensure accuracy; however, inadvertent computerized transcription errors may be present.

## 2022-07-22 NOTE — PROGRESS NOTES
Surgical Intensive Care Unit  Daily Progress Note  Date of admission:  7/20/2022  Reason for ICU transfer:  Bakari's gangrene    Subjective:  No acute events overnight. Received several boluses for low UOP with appropriate response. No pain this morning, is able to follow commands. Hospital Course:  7/21: Admitted with Bakari's. Taken to OR for excisional debridement. On multiple pressors. Admitted to SICU post-op. Glucose remains uncontrolled, started on DKA protocol. 7/22: Several boluses yesterday for low UOP with appropriate response. No acute events overnight. Remains on bicarb drip, levo, and vaso. Glucose better controlled. Physical Exam:  BP (!) 72/58   Pulse 63   Temp 97.3 °F (36.3 °C) (Axillary)   Resp 18   Ht 5' 4\" (1.626 m)   Wt 224 lb 6.4 oz (101.8 kg)   SpO2 100%   BMI 38.52 kg/m²     CONSTITUTIONAL:  Intubated, responsive and follows commands  EYES:  Lids and lashes normal, pupils equal, round and reactive to light, extra ocular muscles intact, sclera clear, conjunctiva normal  NECK:  supple, symmetrical, trachea midline  LUNGS:  On mechanical ventilation  CARDIOVASCULAR:  RR and normotensive on multiple pressors  ABDOMEN:  Soft, non-tender, non-distended. No rebound or guarding. SKIN:  Large wound to right perineum and labia with heavy drainage pack, foul smelling but no sushma purulence    ASSESSMENT / PLAN:  Neuro: Acute pain secondary to surgery, pain control PRN. Precedex and fentanyl. CV: Septic shock, continue IVFs and pressors, wean as able for goal MAP > 65. Elevated BNP secondary to CHF, hold home meds for now. Elevated troponin, likely demand ischemia, monitor. Hx HLD, continue Crestor. Pulm: Acute hypoxemic respiratory failure, on mechanical ventilation, wean as able. GI: Elevated LFTs likely secondary to hypotension, relatively stable. Monitor. Renal: TMOÁS, urine lytes pre-renal, continue IVFs, monitor UOP and Cr.  Cr now trending down, continue with prn boluses. ID:       Bakari's s/p excisional debridement, continue Vanc/Zosyn and Clinda. WBC trending down. Endo: DKA, continue DKA protocol. Transition to basal insulin + SSI if glucose continues to be well controlled. MSK: Large perineal wound, continue local wound care, takeback timing TBD, likely 7/23     Bowel regimen: Glycolax   Pain control/Sedation: Tylenol, Precedex, Fentanyl gtt   DVT prophylaxis: SCDs, SQH  GI: Pepcid   Glucose protocol: Insulin gtt. Transition to basal + SSI if able today. Mouth/Eye care: As needed  [place]: Keep in place for critical care monitoring of fluid balance.   CVC sites: L subclavian TLC Day #2, L radial A-line Day #2  Ancillary consults: IM  Family Update: As available     Code status:   Limited    Electronically signed by Slime Figueroa MD on 7/21/22 at 5:50 AM EDT

## 2022-07-22 NOTE — PLAN OF CARE
Problem: Pain  Goal: Verbalizes/displays adequate comfort level or baseline comfort level  7/22/2022 0853 by Camila Magaña RN  Outcome: Progressing  7/22/2022 0028 by Néstor Law RN  Outcome: Progressing     Problem: Skin/Tissue Integrity  Goal: Absence of new skin breakdown  Description: 1. Monitor for areas of redness and/or skin breakdown  2. Assess vascular access sites hourly  3. Every 4-6 hours minimum:  Change oxygen saturation probe site  4. Every 4-6 hours:  If on nasal continuous positive airway pressure, respiratory therapy assess nares and determine need for appliance change or resting period. 7/22/2022 0853 by Camila Magaña RN  Outcome: Progressing  7/22/2022 0028 by Néstor Law RN  Outcome: Progressing     Problem: Safety - Medical Restraint  Goal: Remains free of injury from restraints (Restraint for Interference with Medical Device)  Description: INTERVENTIONS:  1. Determine that other, less restrictive measures have been tried or would not be effective before applying the restraint  2. Evaluate the patient's condition at the time of restraint application  3. Inform patient/family regarding the reason for restraint  4.  Q2H: Monitor safety, psychosocial status, comfort, nutrition and hydration  7/22/2022 0853 by Camila Magaña RN  Outcome: Progressing  7/22/2022 0028 by Néstor Law RN  Outcome: Progressing  7/22/2022 0027 by Néstor Law RN  Outcome: Progressing     Problem: Respiratory - Adult  Goal: Achieves optimal ventilation and oxygenation  7/22/2022 0853 by Camila Magaña RN  Outcome: Progressing  7/22/2022 0028 by Néstor Law RN  Outcome: Progressing     Problem: Safety - Adult  Goal: Free from fall injury  Outcome: Progressing     Problem: ABCDS Injury Assessment  Goal: Absence of physical injury  Outcome: Progressing

## 2022-07-22 NOTE — ANESTHESIA PRE PROCEDURE
Department of Anesthesiology  Preprocedure Note       Name:  Kaye Short   Age:  74 y.o.  :  1950                                          MRN:  [id number]         Date:  2022      Surgeon: Elly Signs):  Gerardo Mendez MD    Procedure: Procedure(s):  INCISION DEBRIDEMENT OF ABDOMINAL AND PERITONEAL    Medications prior to admission:   Prior to Admission medications    Medication Sig Start Date End Date Taking?  Authorizing Provider   spironolactone (ALDACTONE) 25 MG tablet Take 0.5 tablets by mouth daily 22   Nolberto Akbar MD   VITAMIN D PO Take 1 tablet by mouth daily Patient takes over the counter gummie    Historical Provider, MD   Cyanocobalamin (VITAMIN B12 PO) Take 1 tablet by mouth daily Patient takes over the counter gummie    Historical Provider, MD   carvedilol (COREG) 25 MG tablet Take 1 tablet by mouth 2 times daily (with meals) 3/7/22   Nolberto Akbar MD   sacubitril-valsartan (ENTRESTO) 24-26 MG per tablet Take 1 tablet by mouth 2 times daily 3/7/22   Nolberto Akbar MD   aspirin EC 81 MG EC tablet Take 1 tablet by mouth daily 2/10/22   Daisy Minaya MD   rosuvastatin (CRESTOR) 20 MG tablet Take 1 tablet by mouth daily 2/10/22   Daisy Minaya MD   isosorbide mononitrate (IMDUR) 30 MG extended release tablet Take 1 tablet by mouth daily 22   Nolberto Akbar MD   ferrous sulfate 325 (65 Fe) MG tablet Take 325 mg by mouth daily (with breakfast)    Historical Provider, MD   cyclobenzaprine (FLEXERIL) 10 MG tablet Take 10 mg by mouth 3 times daily as needed for Muscle spasms     Historical Provider, MD   vitamin C (ASCORBIC ACID) 500 MG tablet Take 500 mg by mouth daily Patient takes 2 gummies daily    Historical Provider, MD   FLUoxetine (PROZAC) 20 MG capsule Take 20 mg by mouth daily    Historical Provider, MD   insulin glargine (LANTUS) 100 UNIT/ML injection vial Inject 30 Units into the skin nightly     Historical Provider, MD   oxyCODONE-acetaminophen Karla Mast MD 10 mL/hr at 07/22/22 1137 100 mcg/hr at 07/22/22 1137    polyvinyl alcohol (LIQUIFILM TEARS) 1.4 % ophthalmic solution 1 drop  1 drop Both Eyes Q4H Karla Mast MD   1 drop at 07/22/22 1113    And    lubrifresh P.M. (artificial tears) ophthalmic ointment   Both Eyes Q4H Karla Mast MD   Given at 07/22/22 1219    clindamycin (CLEOCIN) 900 mg in dextrose 5 % 50 mL IVPB  900 mg IntraVENous Q8H Karla Mast MD   Stopped at 07/22/22 0844    dexmedetomidine (PRECEDEX) 1,000 mcg in sodium chloride 0.9 % 250 mL infusion  0.1-1.5 mcg/kg/hr IntraVENous Continuous Karla Mast MD 4.92 mL/hr at 07/22/22 0146 0.2 mcg/kg/hr at 07/22/22 0146    dextrose bolus 10% 125 mL  125 mL IntraVENous PRN Karla Mast MD        Or    dextrose bolus 10% 250 mL  250 mL IntraVENous PRN Karla Mast MD        potassium chloride 10 mEq/100 mL IVPB (Peripheral Line)  10 mEq IntraVENous PRN Karal Mast  mL/hr at 07/22/22 0400 10 mEq at 07/22/22 0400    magnesium sulfate 1000 mg in dextrose 5% 100 mL IVPB  1,000 mg IntraVENous PRN Karla Mast MD        insulin regular (HUMULIN R;NOVOLIN R) 100 Units in sodium chloride 0.9 % 100 mL infusion  0.01-0.5 Units/kg/hr IntraVENous Continuous Karla Mast MD 28.3 mL/hr at 07/22/22 1307 0.288 Units/kg/hr at 07/22/22 1307    piperacillin-tazobactam (ZOSYN) 4,500 mg in dextrose 5 % 100 mL IVPB (Rxyg1Vbu)  4,500 mg IntraVENous Q12H Cristopher Bhakta MD 25 mL/hr at 07/22/22 1028 Rate Verify at 07/22/22 1028    vancomycin (VANCOCIN) intermittent dosing (placeholder)   Other RX Marilyn Peña MD        pantoprazole (PROTONIX) 40 mg in sodium chloride (PF) 10 mL injection  40 mg IntraVENous Daily Cristopher Bhakta MD   40 mg at 07/22/22 0911    rosuvastatin (CRESTOR) tablet 10 mg  10 mg Oral Daily Cristopher Bhakta MD   10 mg at 07/22/22 0911    sodium bicarbonate 75 mEq in dextrose 5 % and 0.45 % NaCl 1,000 mL infusion IntraVENous Continuous Aviva Hill  mL/hr at 07/22/22 0920 New Bag at 07/22/22 0920    norepinephrine (LEVOPHED) 16 mg in dextrose 5% 250 mL infusion  1-100 mcg/min IntraVENous Continuous Roxana Little DO 23.4 mL/hr at 07/22/22 0625 25 mcg/min at 07/22/22 9649       Allergies:  No Known Allergies    Problem List:    Patient Active Problem List   Diagnosis Code    Acute blood loss anemia D62    Diabetes mellitus type 2, uncontrolled ([facility]) E11.65    GI bleed K92.2    Acute respiratory failure with hypoxia ([facility]) J96.01    Chronic back pain M54.9, G89.29    Community acquired pneumonia J18.9    Bacterial pneumonia J15.9    Essential hypertension I10    Tachycardia R00.0    Acute and chronic respiratory failure with hypoxia ([facility]) J96.21    Dilated cardiomyopathy ([facility]) Q34.3    Acute systolic CHF (congestive heart failure) ([facility]) I50.21    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 N76.89    Septic shock ([facility]) A41.9, R65.21    Elevated LFTs R79.89    TMOÁS (acute kidney injury) ([facility] [address] N17.9       Past Medical History:        Diagnosis Date    Asthma     CHF (congestive heart failure) ([facility])     Diabetes mellitus ([facility])     Diverticular disease     Fibromyalgia muscle pain     Glaucoma     Hypertension     Neuropathy        Past Surgical History:        Procedure Laterality Date    ABDOMEN SURGERY Right 7/21/2022    EXCISIONAL DEBRIDEMENT NECROTIZING FASCIITIS PERINEUM AND RIGHT BUTTOCK performed by Bhumi García MD at [address] (CERVIX STATUS UNKNOWN)         Social History:    Social History     Tobacco Use    Smoking status: Never    Smokeless tobacco: Never   Substance Use Topics    Alcohol use: No                                Counseling given: Not Answered      Vital Signs (Current): There were no vitals filed for this visit.                                            BP Readings from Last 3 Encounters:   07/22/22 115/60   04/27/22 117/73   03/17/22 116/70       NPO Status:                                                                                 BMI:   Wt Readings from Last 3 Encounters:   07/22/22 224 lb 6.4 oz (101.8 kg)   04/27/22 208 lb 8 oz (94.6 kg)   03/17/22 187 lb (84.8 kg)     There is no height or weight on file to calculate BMI.    CBC:   Lab Results   Component Value Date/Time    WBC 14.3 07/22/2022 06:00 AM    RBC 3.02 07/22/2022 06:00 AM    HGB 7.5 07/22/2022 06:00 AM    HCT 22.6 07/22/2022 06:00 AM    MCV 74.8 07/22/2022 06:00 AM    RDW 14.2 07/22/2022 06:00 AM     07/22/2022 06:00 AM       CMP:   Lab Results   Component Value Date/Time     07/22/2022 10:00 AM    K 4.3 07/22/2022 10:00 AM    K 4.9 07/22/2022 06:00 AM    CL 94 07/22/2022 10:00 AM    CO2 18 07/22/2022 10:00 AM    BUN 61 07/22/2022 10:00 AM    CREATININE 3.1 07/22/2022 10:00 AM    GFRAA 18 07/22/2022 10:00 AM    LABGLOM 18 07/22/2022 10:00 AM    GLUCOSE 350 07/22/2022 10:00 AM    GLUCOSE 320 11/16/2010 09:10 AM    PROT 4.7 07/22/2022 06:00 AM    CALCIUM 6.3 07/22/2022 10:00 AM    BILITOT 1.0 07/22/2022 06:00 AM    ALKPHOS 144 07/22/2022 06:00 AM     07/22/2022 06:00 AM    ALT 52 07/22/2022 06:00 AM       POC Tests:   Recent Labs     07/20/22  1534   POCGLU 194*   POCNA 140   POCK 3.8   POCCL 108       Coags:   Lab Results   Component Value Date/Time    PROTIME 14.1 10/24/2019 03:10 AM    INR 1.2 10/24/2019 03:10 AM       HCG (If Applicable): No results found for: PREGTESTUR, PREGSERUM, HCG, HCGQUANT     ABGs: No results found for: PHART, PO2ART, OGT6DQJ, ORN4UTN, BEART, Y6MLSCJE     Type & Screen (If Applicable):  No results found for: LABABO, LABRH    Drug/Infectious Status (If Applicable):  No results found for: HIV, HEPCAB    COVID-19 Screening (If Applicable):   Lab Results   Component Value Date/Time    COVID19 Not Detected 07/20/2022 04:13 PM           Anesthesia Evaluation  Patient summary reviewed   history of anesthetic complications (Per daughter, difficult intubation): history of difficult intubation. Airway: Mallampati: Unable to assess / NA         Intubated Dental:          Pulmonary:   (+) asthma:                           ROS comment: CXR 7/2022:  FINDINGS:  The lungs are without acute focal process.  There is no effusion or  pneumothorax.  Cardiomegaly 10/20/2019.  The osseous structures are without  acute process. Cardiovascular:    (+) hypertension:, valvular problems/murmurs (Mild TR):, CAD: obstructive, CHF: systolic,       ECG reviewed  Rhythm: regular  Rate: normal  Echocardiogram reviewed  Stress test reviewed  Cleared by cardiology           ROS comment: EKG 7/2022:  Normal sinus rhythm  Possible Left atrial enlargement  Septal infarct , age undetermined  Possible Lateral infarct , age undetermined  Abnormal ECG  No previous ECGs available    Stress Test 3/2022:  Impression:    1. ECG during the infusion did not change. 2. The myocardial perfusion imaging was abnormal.    The abnormality was a a large sized fixed defect in the apical wall suggestive of a prior MI  3. Overall left ventricular systolic function was normal with wall motion abnormality. 4. Low risk general pharmacologic stress test.    Echo 8/2021:  Summary   Moderate to severe proximal septal wall thickness (1.5 to 1.6cm). Consider   cardiac MRI for further evaluation if none has been done recently. Akinetic apex   Ejection fraction is visually estimated at 48% by Albarran's method. Mild RVH   Normal right ventricular size and function. Mild tricuspid regurgitation. Neuro/Psych:   Negative Neuro/Psych ROS  (+) neuromuscular disease:,             GI/Hepatic/Renal:   (+) renal disease: ARF and CRI,           Endo/Other:    (+) DiabetesType II DM, poorly controlled, using insulin, . Abdominal:             Vascular: negative vascular ROS.          Other Findings:        Cardiology evaluation 1/2021:  \"  Diagnosis    Acute blood loss anemia    Diabetes mellitus type 2, uncontrolled ([facility] [address]    GI bleed    Acute respiratory failure with hypoxia (HCC)    Chronic back pain    Community acquired pneumonia    Bacterial pneumonia    Essential hypertension    Tachycardia    Acute and chronic respiratory failure with hypoxia (HCC)    Acute pulmonary edema (HCC)    Dilated cardiomyopathy (HCC)    Acute systolic CHF (congestive heart failure) ([facility] [address]      Patient with ischemic cardiomyopathy with cath 2019 demonstrating occluded LAD, but no other obstructive disease, and a dense wall motion abnormality in LAD territory on V-gram.  Her most recent echo demonstrates similar findings, with an EF to my estimate at 40%  She did not tolerate upward titration of Entresto  She is euvolemic today on ARNI, full dose carvedilol, and MRA  Recent renal function and K acceptable on present meds  She should also be on a low dose ASA and high intensity statin - I cannot find out from chart why not  \"         Anesthesia Plan      general     ASA 4       Induction: intravenous and rapid sequence. MIPS: Postoperative opioids intended and Prophylactic antiemetics administered. Anesthetic plan and risks discussed with patient. Use of blood products discussed with patient whom. Plan discussed with CRNA.                 Padilla Crabtree MD   7/22/2022

## 2022-07-22 NOTE — CARE COORDINATION
Pt remains intubated on vent. 2 pressors, insulin, fentanyl, bicarb drips cont. Met with Brandi Coates, who is pt's niece, but was raised by pt and also met with pt's brother. Pt lives with her 19yr ols grandson in a 2 story home with b&b on 2nd floor. There is also a bath on 1st floor. Brandi Serarr lives next door and another niece lives in a home on the other side of pt. PCP is Dr Be Leach, preferred pharmacy is [facility] on [facility]. Pt used a st cane for ambulation. Plan is for pt to return to OR in am.  Provided [facility] and [facility] lists to Brandi Coates. Dc plan will be determined when pt more medically stable.

## 2022-07-22 NOTE — PLAN OF CARE
Problem: Safety - Medical Restraint  Goal: Remains free of injury from restraints (Restraint for Interference with Medical Device)  Description: INTERVENTIONS:  1. Determine that other, less restrictive measures have been tried or would not be effective before applying the restraint  2. Evaluate the patient's condition at the time of restraint application  3. Inform patient/family regarding the reason for restraint  4.  Q2H: Monitor safety, psychosocial status, comfort, nutrition and hydration  7/22/2022 0027 by Amanda Spicer RN  Outcome: Progressing  7/21/2022 1210 by Noy Frazier RN  Outcome: Progressing  Flowsheets (Taken 7/21/2022 0800)  Remains free of injury from restraints (restraint for interference with medical device): Every 2 hours: Monitor safety, psychosocial status, comfort, nutrition and hydration

## 2022-07-22 NOTE — PLAN OF CARE
Problem: Pain  Goal: Verbalizes/displays adequate comfort level or baseline comfort level  7/22/2022 0028 by Rosemary Bello RN  Outcome: Progressing  7/21/2022 1210 by Tony Hsu RN  Outcome: Progressing     Problem: Skin/Tissue Integrity  Goal: Absence of new skin breakdown  Description: 1. Monitor for areas of redness and/or skin breakdown  2. Assess vascular access sites hourly  3. Every 4-6 hours minimum:  Change oxygen saturation probe site  4. Every 4-6 hours:  If on nasal continuous positive airway pressure, respiratory therapy assess nares and determine need for appliance change or resting period. 7/22/2022 0028 by Rosemary Bello RN  Outcome: Progressing  7/21/2022 1210 by Tony Hsu RN  Outcome: Progressing     Problem: Safety - Medical Restraint  Goal: Remains free of injury from restraints (Restraint for Interference with Medical Device)  Description: INTERVENTIONS:  1. Determine that other, less restrictive measures have been tried or would not be effective before applying the restraint  2. Evaluate the patient's condition at the time of restraint application  3. Inform patient/family regarding the reason for restraint  4. Q2H: Monitor safety, psychosocial status, comfort, nutrition and hydration  7/22/2022 0028 by Rosemary Bello RN  Outcome: Progressing  7/22/2022 0027 by Rosemary Bello RN  Outcome: Progressing  7/21/2022 1210 by Tony Hsu RN  Outcome: Progressing  Flowsheets (Taken 7/21/2022 0800)  Remains free of injury from restraints (restraint for interference with medical device): Every 2 hours: Monitor safety, psychosocial status, comfort, nutrition and hydration     Problem: Respiratory - Adult  Goal: Achieves optimal ventilation and oxygenation  7/22/2022 0028 by Rosemary Bello RN  Outcome: Progressing  7/21/2022 1545 by Juliette Mckeon RCP  Outcome: Progressing  Flowsheets (Taken 7/21/2022 1545)  Achieves optimal ventilation and oxygenation:   Position to facilitate oxygenation and minimize respiratory effort   Respiratory therapy support as indicated   Oxygen supplementation based on oxygen saturation or arterial blood gases

## 2022-07-23 NOTE — PROGRESS NOTES
CRITICAL CARE BRIEF PROGRESS NOTE    Had discussion with patient's daughter and her son regarding patient's clinical status. Informed them that she remains critically ill, now on three pressors and progressing to both kidney and liver failure. Nephrology was consulted with plans for likely dialysis. Patient's daughter was realistic and understanding, and again mentioned that she is not sure her mother would want any of this. She plans to have a family meeting tonight to discuss whether they will be pursuing any additional measures. Until tomorrow, she will remain limited code.      Ceci Drew MD  General Surgery PGY-3

## 2022-07-23 NOTE — CONSULTS
Palliative Care Department  [phone]  Palliative Care Initial Consult  Provider RADHA Espinoza - CNP      PATIENT: Collin Figueroa  : 1950  MRN: [id number]  ADMISSION DATE: 2022  3:13 PM  Referring Provider: Zuri Lubin MD    Palliative Medicine was consulted on hospital day 2 for assistance with Goals of care, Code Status Discussion    HPI:     James Melendez is a 74 y.o. y/o female with a history of diabetes type 2, CHF, EF of 45 to 50%, fibromyalgia, glaucoma, hypertension who presented to [facility]) on 2022 from home with altered mental status. In the emergency room, laboratory findings shows lactic acid of 7.4, proBNP of 7528, cortisol level 33.69, BUN 61, creatinine 3.4, potassium 5.9. CT of the abdomen and pelvis shows soft tissues gas along the right perineum extending into the anterior lower abdominal wall, and right gluteal region concerning for Bakari's gangrene. Patient was also found to be hypotensive and bradycardic, Levophed and epinephrine were initiated. Patient required to be intubated, but patient's POA stated the patient does not want to be intubated. CODE STATUS was addressed at the moment, and she is a limited code. Neurosurgery was consulted, on 2022 patient had a excisional debridement of abdomen and perineum with findings of necrotizing fasciitis. Postprocedure patient has been remained intubated, sedated, on pressor, and insulin drip. For her acute kidney injury, nephrology is following, has spoken with family and patient most likely will need hemodialysis.   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 Capacity is time limited and situation/question specific  During encounter no one was surrogate medical decision-maker  Outcome of goals of care meeting:  Noman Rasheed stated that she is having family discussion today about goal of care for the patient, she is not sure if her mother would have wanted to have a hemodialysis, and is requesting to speak with palliative medicine face-to-face  Noman Rasheed want to speak with palliative medicine at the bedside tomorrow between 9:30 AM and noon. CODE STATUS established to limited no intubation    Code status Limited no intubation-reintubation  Advanced Directives: no POA or living will in [facility]  Surrogate/Legal NOK:  Levy Orozco 22 [id number]  Gigi Wang (Granddaughter) [phone]    Spiritual assessment: no spiritual distress identified  Bereavement and grief: to be determined  Referrals to: none today    Thank you for the opportunity to participate in the care of Abdulkadir Boudreaux. RADHA Hernández CNP  Palliative Medicine     SUBJECTIVE:     Details of Conversation:   Chart reviewed. Patient remains on 2 pressors, vasopressin and Levophed, also she is on insulin drip. She is intubated, sedated but follows commands. Spoke with bedside nurse, patient has poor urine output, family discussions regarding possibility of hemodialysis. CODE STATUS has been established to limited no intubation-reintubation. Currently patient is intubated post excisional debridement of perineum and right buttocks done on 7/21/2022. Attempt to call patient daughter Noman Rasheed, left voicemail to call us back. Call patient's granddaughter Rik over the phone, both phone numbers in our records is incorrect, person who answered phone does not know anyone with a name of Rik. Will attempt to call daughter at a later time. 1315: Addendum    Patient's daughter Noman Carton call back, introduced myself and the role of palliative medicine.   Noman Rasheed

## 2022-07-23 NOTE — PROGRESS NOTES
Pharmacy Consultation Note  (Antibiotic Dosing and Monitoring)    Initial consult date: 7/21/22  Consulting physician/provider: Dr Parminder Neumann  Drug: Vancomycin  Indication: NSTI    Age/  Gender Height Weight IBW  Allergy Information   72 y.o./female 5' 4\" (162.6 cm) 208 lb (94.3 kg)     Ideal body weight: 54.7 kg (120 lb 9.5 oz)  Adjusted ideal body weight: 78.7 kg (173 lb 7 oz)   Patient has no known allergies. Renal Function:  Recent Labs     07/22/22  1800 07/23/22  0005 07/23/22  0605   BUN 59* 55* 57*   CREATININE 3.1* 2.9* 2.9*         Intake/Output Summary (Last 24 hours) at 7/23/2022 0815  Last data filed at 7/23/2022 0649  Gross per 24 hour   Intake 8704.75 ml   Output 599 ml   Net 8105.75 ml         Vancomycin Monitoring:  Trough:  No results for input(s): VANCOTROUGH in the last 72 hours. Random:    Recent Labs     07/22/22  0005 07/23/22  0605   VANCORANDOM 16.1 21.6         Vancomycin Administration Times:  Recent vancomycin administrations                     vancomycin 1500 mg in dextrose 5% 300 mL IVPB (mg) 1,500 mg New Bag 07/22/22 0802    vancomycin (VANCOCIN) 2,000 mg in dextrose 5 % 500 mL IVPB (mg) 2,000 mg New Bag 07/20/22 2251                    Assessment:  Patient is a 74 y.o. female who has been initiated on vancomycin  Estimated Creatinine Clearance: 21 mL/min (A) (based on SCr of 2.9 mg/dL (H)). To dose vancomycin, pharmacy will be utilizing dosing based off of levels because of patient's renal impairment/insufficiency  Vancomycin random 21.6 mcg/mL from 0605     Plan:   Will order Vancomycin 1000 mg IV x1 dose today at 2000  Will check vancomycin random level with AM labs tomorrow  Will continue to monitor renal function   Clinical pharmacy to follow    Phyllis Patiño, PharmD, [facility]  7/23/2022  8:17 AM

## 2022-07-23 NOTE — PLAN OF CARE
Problem: Pain  Goal: Verbalizes/displays adequate comfort level or baseline comfort level  Outcome: Progressing     Problem: Pain  Goal: Verbalizes/displays adequate comfort level or baseline comfort level  Outcome: Progressing     Problem: Skin/Tissue Integrity  Goal: Absence of new skin breakdown  Description: 1. Monitor for areas of redness and/or skin breakdown  2. Assess vascular access sites hourly  3. Every 4-6 hours minimum:  Change oxygen saturation probe site  4. Every 4-6 hours:  If on nasal continuous positive airway pressure, respiratory therapy assess nares and determine need for appliance change or resting period. Outcome: Progressing     Problem: Safety - Medical Restraint  Goal: Remains free of injury from restraints (Restraint for Interference with Medical Device)  Description: INTERVENTIONS:  1. Determine that other, less restrictive measures have been tried or would not be effective before applying the restraint  2. Evaluate the patient's condition at the time of restraint application  3. Inform patient/family regarding the reason for restraint  4.  Q2H: Monitor safety, psychosocial status, comfort, nutrition and hydration  7/23/2022 0305 by Theersa Momin RN  Outcome: Progressing  7/22/2022 2032 by Theresa Momin RN  Outcome: Progressing     Problem: Nutrition Deficit:  Goal: Optimize nutritional status  Outcome: Progressing     Problem: Respiratory - Adult  Goal: Achieves optimal ventilation and oxygenation  7/23/2022 0305 by Theresa Momin RN  Outcome: Progressing  7/22/2022 1545 by Sneha Leyva RCP  Outcome: Progressing     Problem: Nutrition Deficit:  Goal: Optimize nutritional status  Outcome: Progressing     Problem: Safety - Adult  Goal: Free from fall injury  Outcome: Progressing     Problem: ABCDS Injury Assessment  Goal: Absence of physical injury  Outcome: Progressing     Problem: Safety - Medical Restraint  Goal: Remains free of injury from restraints (Restraint for Interference with Medical Device)  Description: INTERVENTIONS:  1. Determine that other, less restrictive measures have been tried or would not be effective before applying the restraint  2. Evaluate the patient's condition at the time of restraint application  3. Inform patient/family regarding the reason for restraint  4.  Q2H: Monitor safety, psychosocial status, comfort, nutrition and hydration  7/23/2022 0305 by Nathnaael Barrera RN  Outcome: Progressing  7/22/2022 2032 by Nathanael Barrera RN  Outcome: Progressing

## 2022-07-23 NOTE — PROGRESS NOTES
positive      ID: Continue Zosyn, clinda, vancomycin for necrotizing soft tissue infection. Await culture    Heme: Monitor CBC    Endo: Monitor Blood Sugars. Target blood glucose less than 180 in the ICU. Continue insulin drip. Patient is a poorly controlled diabetic. Her hemoglobin A1c is 9.9. Diabetic ketoacidosis protocol    Cortisol 10-start stress steroids    DVT prophylaxis--SCDS, heparin 3 times daily  GI Prophylaxis--Protonix  Lines--arterial line, central line 7/21  CODE: Limited code-no intubation/reintubation, okay for defib cardioversion chest compression recessive medications     DISPOSITION-Continue ICU Care    Critical care time exclusive of teaching and procedures =45min     I provided critical care to a patient with septic shock and diabetic ketoacidosis in the setting of necrotizing soft tissue infection, multisystem organ failure requiring frequent and emergent imaging, lab studies, intensive monitoring, data review, and adjusting the clinical plan as well as urgent coordination with multiple specialists. Pt needs continuous ICU monitoring because the patient is at risk for deterioration from a multisystem organ failure standpoint         Bethel Farris MD, FACS  7/23/2022  9:25 AM    NOTE: This report was transcribed using voice recognition software. Every effort was made to ensure accuracy; however, inadvertent computerized transcription errors may be present.

## 2022-07-23 NOTE — CONSULTS
Nephrology Consult  [facility]  Lea Avitia MD    CC:   oliguria    HPI:   the pt is a a 75 yo female with a pmh of HFrEF, dm, htn, glaucoma, fibromyalgia, neuropathy who was admitted 7/20/22 with hyperglycemia and lethargy. history is obtained from the chart and the family. family says she recently was on abc for a uti and chart says she had abd pain for a week prior to admission. 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 She was started on 3 pressors, intubated, and is on clinda, vanco, and zosyn. Her cr on admission was 3.3>3.7>2.9. baseline cr from 6/8/21 was 1.6 and was 1.1-1.3 in months prior. She is  on a bicarb drip and is 20 L positive. Uo is oliguric and she is in dka. Outpt entresto and aldactone are on hold.      PMH:    Past Medical History:   Diagnosis Date    Asthma     CHF (congestive heart failure) ([facility] [address]     Diabetes mellitus ([facility] [address]     Diverticular disease     Fibromyalgia muscle pain     Glaucoma     Hypertension     Neuropathy        Patient Active Problem List   Diagnosis    Acute blood loss anemia    Diabetes mellitus type 2, uncontrolled (HCC)    GI bleed    Acute respiratory failure with hypoxia (HCC)    Chronic back pain    Community acquired pneumonia    Bacterial pneumonia    Essential hypertension    Tachycardia    Acute and chronic respiratory failure with hypoxia (HCC)    Dilated cardiomyopathy (HCC)    Acute systolic CHF (congestive heart failure) (HCC)    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    Septic shock (HCC)    Elevated LFTs    TOMÁS (acute kidney injury) ([facility] [address]       Meds:     hydrocortisone sodium succinate PF  100 mg IntraVENous Q8H    vancomycin  1,000 mg IntraVENous Once    calcium gluconate IVPB  4,000 mg IntraVENous Once    albumin human        sodium chloride flush  5-40 mL IntraVENous 2 times per day    heparin (porcine)  5,000 Units SubCUTAneous 3 times per day    acetaminophen  650 mg Oral Q6H polyethylene glycol  17 g Oral Daily    FLUoxetine  20 mg Oral Daily    chlorhexidine  15 mL Mouth/Throat BID    polyvinyl alcohol  1 drop Both Eyes Q4H    And    artificial tears   Both Eyes Q4H    clindamycin (CLEOCIN) IV  900 mg IntraVENous Q8H    piperacillin-tazobactam  4,500 mg IntraVENous Q12H    vancomycin (VANCOCIN) intermittent dosing (placeholder)   Other RX Placeholder    pantoprazole (PROTONIX) 40 mg injection  40 mg IntraVENous Daily    rosuvastatin  10 mg Oral Daily        EPINEPHrine infusion 12 mcg/min (07/23/22 1035)    sodium chloride      sodium chloride      vasopressin (Septic Shock) infusion 0.04 Units/min (07/23/22 1035)    fentaNYL 100 mcg/hr (07/23/22 1035)    dexmedetomidine (PRECEDEX) IV infusion 0.3 mcg/kg/hr (07/22/22 2133)    insulin 0.03 Units/kg/hr (07/23/22 1035)    sodium bicarbonate infusion 150 mL/hr at 07/23/22 1035    norepinephrine 34 mcg/min (07/23/22 1035)       Meds prn:     sodium chloride, sodium chloride flush, sodium chloride, ondansetron **OR** ondansetron, dextrose bolus **OR** dextrose bolus, potassium chloride, magnesium sulfate    Meds prior to admission:     No current facility-administered medications on file prior to encounter.      Current Outpatient Medications on File Prior to Encounter   Medication Sig Dispense Refill    spironolactone (ALDACTONE) 25 MG tablet Take 0.5 tablets by mouth daily 30 tablet 5    VITAMIN D PO Take 1 tablet by mouth daily Patient takes over the counter gummie      Cyanocobalamin (VITAMIN B12 PO) Take 1 tablet by mouth daily Patient takes over the counter gummie      carvedilol (COREG) 25 MG tablet Take 1 tablet by mouth 2 times daily (with meals) 60 tablet 5    sacubitril-valsartan (ENTRESTO) 24-26 MG per tablet Take 1 tablet by mouth 2 times daily 60 tablet 5    aspirin EC 81 MG EC tablet Take 1 tablet by mouth daily 90 tablet 3    rosuvastatin (CRESTOR) 20 MG tablet Take 1 tablet by mouth daily 90 tablet 3    isosorbide mononitrate (IMDUR) 30 MG extended release tablet Take 1 tablet by mouth daily 30 tablet 3    ferrous sulfate 325 (65 Fe) MG tablet Take 325 mg by mouth daily (with breakfast)      cyclobenzaprine (FLEXERIL) 10 MG tablet Take 10 mg by mouth 3 times daily as needed for Muscle spasms       vitamin C (ASCORBIC ACID) 500 MG tablet Take 500 mg by mouth daily Patient takes 2 gummies daily      FLUoxetine (PROZAC) 20 MG capsule Take 20 mg by mouth daily      insulin glargine (LANTUS) 100 UNIT/ML injection vial Inject 30 Units into the skin nightly       oxyCODONE-acetaminophen (PERCOCET) 7.5-325 MG per tablet Take 1 tablet by mouth every 6 hours as needed for Pain. Allergies:    Patient has no known allergies. Social History:     reports that she has never smoked. She has never used smokeless tobacco. She reports that she does not drink alcohol and does not use drugs.     Family History:         Problem Relation Age of Onset    Kidney Disease Mother     Heart Failure Mother     Other Father         aneurysm    Other Sister         scleroderma    Cancer Brother         throat       ROS:     General: no fever, chills   Heent: no nasal congestion, sore throat   Resp: no cough, sob , hemoptysis  Cardiac: no cp , le edema, palpitations  Gi: no nausea, vomiting, melena, abd pain, hematemesis  Gu: no hematuria, dysuria   Neruo: no numbness, weakness, headache, blurry vision   Endocrine:  no h/o dm  Derm: no rash , petechia  Heme: no epistaxis, bruising  All other sx negative     Physical Exam:      Patient Vitals for the past 24 hrs:   BP Temp Temp src Pulse Resp SpO2 Weight   07/23/22 1000 -- -- -- 58 14 100 % --   07/23/22 0900 -- -- -- 61 11 -- --   07/23/22 0820 -- -- -- 62 15 99 % --   07/23/22 0800 (!) 152/48 96.8 °F (36 °C) Axillary 63 14 99 % --   07/23/22 0700 (!) 141/49 -- -- 60 14 -- --   07/23/22 0652 -- -- -- 60 14 -- 252 lb 11.2 oz (114.6 kg)   07/23/22 0600 129/73 97.6 °F (36.4 °C) -- 59 16 -- --   07/23/22 0530 -- 97.7 °F (36.5 °C) -- 59 14 -- --   07/23/22 0502 -- -- -- 64 18 -- --   07/23/22 0500 109/83 97.7 °F (36.5 °C) -- 64 19 -- --   07/23/22 0438 136/65 97.6 °F (36.4 °C) -- 61 14 98 % --   07/23/22 0430 -- 97.6 °F (36.4 °C) -- 62 15 -- --   07/23/22 0415 -- 97.6 °F (36.4 °C) -- 63 16 98 % --   07/23/22 0406 (!) 119/59 97.6 °F (36.4 °C) -- 68 14 98 % --   07/23/22 0400 116/83 97.6 °F (36.4 °C) -- 61 16 100 % --   07/23/22 0345 -- 97.6 °F (36.4 °C) -- 62 15 100 % --   07/23/22 0330 -- -- -- 63 18 -- --   07/23/22 0327 (!) 104/52 97.6 °F (36.4 °C) -- 62 14 98 % --   07/23/22 0300 (!) 164/45 -- -- 62 15 98 % --   07/23/22 0230 -- -- -- 68 16 98 % --   07/23/22 0216 -- -- -- 58 26 98 % --   07/23/22 0200 (!) 165/50 97.8 °F (36.6 °C) Oral 66 16 98 % --   07/23/22 0130 -- 97.5 °F (36.4 °C) -- 61 16 96 % --   07/23/22 0120 -- -- -- 54 18 99 % --   07/23/22 0115 -- -- -- 54 19 98 % --   07/23/22 0110 -- -- -- 58 16 98 % --   07/23/22 0105 -- -- -- 61 14 97 % --   07/23/22 0100 (!) 112/54 97.3 °F (36.3 °C) Oral 63 17 100 % --   07/23/22 0030 -- 97.1 °F (36.2 °C) Oral 66 27 99 % --   07/23/22 0015 -- -- -- 62 14 100 % --   07/23/22 0010 -- -- -- 62 16 100 % --   07/23/22 0008 -- -- -- 62 16 100 % --   07/23/22 0005 -- -- -- 62 15 100 % --   07/23/22 0000 (!) 177/32 97 °F (36.1 °C) Axillary 61 16 100 % --   07/22/22 2345 -- 97 °F (36.1 °C) -- 65 18 100 % --   07/22/22 2330 (!) 163/75 96.9 °F (36.1 °C) Axillary -- 14 100 % --   07/22/22 2315 -- 96.9 °F (36.1 °C) Axillary -- 14 100 % --   07/22/22 2100 -- -- -- 68 16 100 % --   07/22/22 2000 (!) 99/43 98.6 °F (37 °C) Axillary 69 19 100 % --   07/22/22 1900 (!) 94/51 -- -- 69 20 100 % --   07/22/22 1800 (!) 100/43 97.4 °F (36.3 °C) Axillary 70 17 100 % --   07/22/22 1700 -- -- -- 71 21 100 % --   07/22/22 1600 (!) 90/31 -- -- 69 22 99 % --   07/22/22 1551 -- -- -- 67 25 97 % --   07/22/22 1537 -- -- -- 67 24 97 % --   07/22/22 1509 -- -- -- 70 24 98 % --   07/22/22 1500 (!) 108/41 -- -- 71 19 94 % --   07/22/22 1446 -- -- -- 71 20 100 % --   07/22/22 1407 -- -- -- 70 20 100 % --   07/22/22 1400 -- -- -- 70 22 100 % --   07/22/22 1354 -- -- -- 71 23 97 % --   07/22/22 1348 (!) 106/51 -- -- 70 21 100 % --   07/22/22 1340 (!) 97/49 -- -- 69 20 100 % --   07/22/22 1335 -- -- -- 70 20 100 % --   07/22/22 1332 (!) 95/51 -- -- 69 21 100 % --   07/22/22 1329 -- -- -- 70 24 100 % --   07/22/22 1300 105/74 -- -- 67 18 96 % --   07/22/22 1200 115/60 97.1 °F (36.2 °C) Axillary 66 18 99 % --   07/22/22 1158 -- -- -- 65 18 -- --   07/22/22 1115 -- -- -- 64 18 99 % --         Intake/Output Summary (Last 24 hours) at 7/23/2022 1114  Last data filed at 7/23/2022 1035  Gross per 24 hour   Intake 04565.52 ml   Output 591 ml   Net 9776.52 ml       Constitutional: Patient is intubated  Head: normocephalic, atraumatic   Neck: supple, no jvd  Cardiovascular: regular rate and rhythm, no murmurs, gallops, or rubs   Respiratory: Clear, no rales, rhochi, or wheezes,   Gastrointestinal: soft, nontender, nondistended, no hepatosplenomegaly  Ext: no pitting edema  Neuro: sedated  Skin: dry, no rash   Back: nontender    Data:    Recent Labs     07/22/22  0600 07/23/22  0120 07/23/22  0605   WBC 14.3* 10.5 23.9*   HGB 7.5* 4.9* 9.6*   HCT 22.6* 14.8* 27.9*   MCV 74.8* 77.1* 79.7*   * 68* 109*       Recent Labs     07/22/22  1800 07/23/22  0005 07/23/22  0605   * 126* 125*   K 3.9 3.9 5.0  5.0   CL 95* 96* 93*   CO2 17* 18* 16*   CREATININE 3.1* 2.9* 2.9*   BUN 59* 55* 57*   LABGLOM 18 19 19   GLUCOSE 116* 128* 281*   CALCIUM 6.9* 5.9* 6.3*   PHOS 3.3 3.8 4.2   MG 2.1 1.9 1.9       No results found for: VITD25    No results found for: PTH    Recent Labs     07/21/22  0615 07/22/22  0600 07/23/22  0605   ALT 42* 52* 43*   AST 84* 112* 115*   ALKPHOS 138* 144* 429*   BILITOT 1.0 1.0 1.2       Recent Labs     07/21/22  0615 07/22/22  0600 07/23/22  0605   LABALBU 1.5* 1.2* 1.4*       No results found for: FERRITIN, IRON, TIBC    No results found for: AGEPLTHW07    No results found for: FOLATE      Lab Results   Component Value Date/Time    COLORU Yellow 07/20/2022 04:13 PM    NITRU Negative 07/20/2022 04:13 PM    GLUCOSEU 250 07/20/2022 04:13 PM    KETUA Negative 07/20/2022 04:13 PM    UROBILINOGEN 1.0 07/20/2022 04:13 PM    BILIRUBINUR Negative 07/20/2022 04:13 PM       Lab Results   Component Value Date/Time    OSMOU 514 07/20/2022 11:26 PM       No components found for: URIC    No results found for: LIPIDPAN      Assessment and Plan:    Arf on ckd 3a  Baseline 1.1-1.6  In setting of septic shock on 3 pressors, outpt entresto  Decreased renal perfusion  Fena <1  Ct without no hydro  Holding entresto  Continue hemodynamic support  Oliguric  Discussed dialysis with family and they are deciding  She wouldt want long term hd  Hd may be temporary    2. Metabolic acidosis  Due to arf/sepsis  Lactic 2.1  On hco3 drip    3. Septic shock  Fourniers gangrene  Wbc 23  Sp I and d 7/21 and 7/22  Abx vanco zosyn clinda  Per gen surg    4. Dka  Per icu protocol    5. Hyonatremia  In part due to hyperglycemia  On hco3 drip  Check tsh cortisol  Fena <1    6. Vol overload  20L positive  Oliguric  On 3 pressors  Sugar wont tolerate lasix drip  Family considering hd, sugar would need cvvh  Follow uo    6. Hypocalemia  Replace prn  Alb 1.4  Ionized low  Check pth vit d    Pt dw 2 family at bedside, icu nurse present. Also d/w dr Danay Adams at bedside      [address] Julienne Hernández MD

## 2022-07-23 NOTE — ANESTHESIA POSTPROCEDURE EVALUATION
Department of Anesthesiology  Postprocedure Note    Patient: Kilo Jurado  MRN: [id number]  YOB: 1950  Date of evaluation: 7/23/2022      Procedure Summary     Date: 07/21/22 Room / Location: [facility] OR  / [facility]    Anesthesia Start: 0103 Anesthesia Stop: 0234    Procedure: EXCISIONAL DEBRIDEMENT NECROTIZING FASCIITIS PERINEUM AND RIGHT BUTTOCK (Right: Perineum) Diagnosis:       Necrotizing fasciitis ([address]      (NECROTIZING FASCIITIS PERINEUM AND RIGHT BUTTOCK)    Surgeons: Allison Alberts MD Responsible Provider: Esteban Mullins MD    Anesthesia Type: general ASA Status: 4 - Emergent          Anesthesia Type: No value filed.     Demetris Phase I: Demetris Score: 8    Demetris Phase II:        Anesthesia Post Evaluation    Patient location during evaluation: ICU  Patient participation: complete - patient cannot participate  Level of consciousness: awake and alert and sedated and ventilated  Airway patency: patent  Nausea & Vomiting: no nausea and no vomiting  Complications: no  Cardiovascular status: vasoactive/inotropes  Respiratory status: ventilator  Hydration status: euvolemic

## 2022-07-23 NOTE — OP NOTE
Operative Note      Patient: Marcia Gama  YOB: 1950  MRN: [id number]    Date of Procedure: 7/22/2022    Pre-Op Diagnosis: necrotizing fasciitis    Post-Op Diagnosis: Same and involving abdominal wall through muscle and fascia. Procedure(s):  EXCISIONAL DEBRIDEMENT OF ABDOMEN AND PERINEUM NECROTIZING SOFT TISSUE INFECTION    abdominal wound measured 27cm L x 30cm W x 3cm D. Involving skin, subcutaneous tissue, fascia and muscle    Surgeon(s):  Maricel Herrera MD    Assistant:   Resident: Grant Hays DO    Anesthesia: General    Estimated Blood Loss (mL): 716      Complications: None    Specimens:   * No specimens in log *    Implants:  * No implants in log *      Drains:   NG/OG/NJ/NE Tube Orogastric (Active)   Surrounding Skin Clean, dry & intact 07/22/22 2000   Securement device Tape 07/22/22 2000   Status Suction-low intermittent 07/22/22 2000   Placement Verified X-Ray (Initial) 07/22/22 2000   Drainage Appearance Green;Bile 07/22/22 2000   Free Water/Flush (mL) 30 mL 07/22/22 0300   Output (mL) 250 ml 07/22/22 0600       Urinary Catheter (Active)   Catheter Indications Need for fluid volume management of the critically ill patient in a critical care setting 07/22/22 2000   Site Assessment No urethral drainage 07/22/22 2000   Urine Color Yellow 07/22/22 2000   Urine Appearance Sediment 07/22/22 2000   Collection Container Standard 07/22/22 2000   Securement Method Securing device (Describe) 07/22/22 2000   Status Draining; Other (comment) 07/22/22 2000   Output (mL) 8 mL 07/22/22 2100       Findings: necrosis of mons pubis tracking up through inguinal canal on to abdominal wall. Abdominal wall necrotizing infection involved anterior rectus sheath with viable muscle deep to this. HISTORY: Marcia Gama is a 74 y.o. female admitted with necrotizing soft tissue infection of the right buttocks, perineum and abdominal wall. Repeat excisional debridement was recommended.  The risks benefits and alternatives of the procedure were discussed with the POA who stated understanding and agreed to proceed. DESCRIPTION OF PROCEDURE: The patient was brought to the operating room and positioned supine in low lithotomy on the OR table. Sequential compression devices were placed on the patient's lower extremities and functioning. Preoperative antibiotics were administered. Anesthesia was obtained without complication as per the anesthesia record. Immediately prior to the procedure a time-out was called and the surgical checklist was reviewed and agreed upon by all present. The patient was prepped and draped in the usual sterile fashion. Necrosis was noted at the mons pubis and there was subcutaneous crepitus of right lower abdominal wall and pannus. The skin was incised with bovie electrocautery starting at existing wound and opened up there right lower abdominal wall. Encountered necrotic josé's fascia, subcutaneous tissue which were excised. This revealed additional necrosis of anterior rectus sheath fascia which was also excised to expose healthy appearing rectus muscle, and internal oblique oblique muscle below. The wound tracked laterally towards the ASIS and this cavity was also bluntly opened and necrotic tissue excised until a margin of healthy tissue was encountered. The inguinal canal was opened up overlying necrotic tissue was excised with bovie and sharp dissection. Bleeding was controlled with electrocautery. The total area of tissue excised on the abdominal wound measured 27cm L x 30cm W x 3cm D. The perineum and gluteal wounds were also explored. The gluteal wound showed some fat necrosis but no further areas needing debrided. The perineum also appeared clean except for exudative tissue at base of wound. All wounds were irrigated with copious amounts of sterile saline. Hemostasis achieved with bovie. Decision was made to pack wound with dakins and kerlix.   6 kerlix were packed into the wounds, 2 dry kerlix applied on top as fluffs and heavy drainage pack x 3 was used as dressing. Needle, sponge, and instrument counts were reported as correct x2. Dr. Yuki Garcia was present and scrubbed throughout the case. The patient tolerated the procedure without immediate complications. She was transferred back to the ICU and remains in critical condition. Electronically signed by Grant Hays DO on 7/22/22 at 11:23 PM EDT        I agree and was present for all critical aspects of the procedure.     Maricel Herrera MD

## 2022-07-23 NOTE — ANESTHESIA POSTPROCEDURE EVALUATION
Department of Anesthesiology  Postprocedure Note    Patient: Vida Flanagan  MRN: [id number]  YOB: 1950  Date of evaluation: 7/23/2022      Procedure Summary     Date: 07/22/22 Room / Location: [facility] OR 08 / [facility]    Anesthesia Start: 2133 Anesthesia Stop: 2315    Procedure: EXCISIONAL DEBRIDEMENT OF ABDOMEN AND PERINEUM (Abdomen/Perineum) Diagnosis:       Necrotizing fasciitis ([address]      (/)    Surgeons: Teofilo Maldonado MD Responsible Provider: Judy Ware MD    Anesthesia Type: general ASA Status: 4          Anesthesia Type: No value filed.     Demetris Phase I: Demetris Score: 8    Demetris Phase II:        Anesthesia Post Evaluation    Patient location during evaluation: ICU  Patient participation: complete - patient cannot participate  Level of consciousness: sedated and ventilated  Pain score: 0  Airway patency: patent  Nausea & Vomiting: no nausea  Complications: no  Cardiovascular status: hemodynamically stable and vasoactive/inotropes  Respiratory status: ventilator  Hydration status: stable  Multimodal analgesia pain management approach

## 2022-07-23 NOTE — PROGRESS NOTES
Hospitalist Progress Note      Synopsis: Patient admitted on 7/20/2022    Patient admitted for blood sugar of 305, increased abdominal pain and \"lethargy\" that occurred suddenly yesterday afternoon. Patient states that she has been experiencing low back/hip pain for the last month that is moderate in severity, worsened and relieved by nothing. Additionally, patient complains of abdominal pain that has been worsening over the last week. She was scheduled to go to her PCP on day of complaint but was too weak to leave her home. Patient denies chest pain, shortness of breath, fever, aches, chills. Patient was hypotensive upon arrival to the ED and was started on Levophed and epinephrine. Patient does have history of CHF with EF of 45 to 50%, diverticulitis and diabetes. 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 Surgery was consulted and patient was taken to surgery for excisional debridement of perineum, right buttocks, mons pubis, and possible lower abdominal wall. Patient was admitted to medicine for further observation and management of sepsis. Course significant for BMP with an decreased sodium of 131, decreased chloride at 93, decreased CO2 at 16, increased BUN at 68, increased creatinine to 3.7 and elevated anion gap at 22. Lactic acid was elevated at 3.6  Troponin on presentation was 47 and subsequently raised to 54. GI liver profile significant for decreased albumin at 2.3 elevated alk phos at 199, ALT elevated at 48, AST elevated at 113 and the patient's blood glucose level was 345    CBC significant for elevated WBC at 34.2, hemoglobin decreased at 9.3, and hematocrit 29.4    Blood cultures were drawn in ER    Urinalysis was positive for glucose with a moderate amount of blood and protein appreciated.   Leukocyte esterase was negative with many heparin (porcine)  5,000 Units SubCUTAneous 3 times per day    acetaminophen  650 mg Oral Q6H    polyethylene glycol  17 g Oral Daily    FLUoxetine  20 mg Oral Daily    chlorhexidine  15 mL Mouth/Throat BID    polyvinyl alcohol  1 drop Both Eyes Q4H    And    artificial tears   Both Eyes Q4H    clindamycin (CLEOCIN) IV  900 mg IntraVENous Q8H    piperacillin-tazobactam  4,500 mg IntraVENous Q12H    vancomycin (VANCOCIN) intermittent dosing (placeholder)   Other RX Placeholder    pantoprazole (PROTONIX) 40 mg injection  40 mg IntraVENous Daily    rosuvastatin  10 mg Oral Daily     PRN Meds: sodium chloride, sodium chloride flush, sodium chloride, ondansetron **OR** ondansetron, dextrose bolus **OR** dextrose bolus, potassium chloride, magnesium sulfate    I/O    Intake/Output Summary (Last 24 hours) at 7/23/2022 1110  Last data filed at 7/23/2022 1035  Gross per 24 hour   Intake 54909.52 ml   Output 591 ml   Net 9776.52 ml       Labs:   Recent Labs     07/22/22  0600 07/23/22  0120 07/23/22  0605   WBC 14.3* 10.5 23.9*   HGB 7.5* 4.9* 9.6*   HCT 22.6* 14.8* 27.9*   * 68* 109*       Recent Labs     07/22/22  1800 07/23/22  0005 07/23/22  0605   * 126* 125*   K 3.9 3.9 5.0  5.0   CL 95* 96* 93*   CO2 17* 18* 16*   BUN 59* 55* 57*   CREATININE 3.1* 2.9* 2.9*   CALCIUM 6.9* 5.9* 6.3*   PHOS 3.3 3.8 4.2       Recent Labs     07/21/22  0615 07/22/22  0600 07/23/22  0605   PROT 4.8* 4.7* 4.2*   ALKPHOS 138* 144* 429*   ALT 42* 52* 43*   AST 84* 112* 115*   BILITOT 1.0 1.0 1.2       No results for input(s): INR in the last 72 hours. No results for input(s): Jacey Height in the last 72 hours.     Chronic labs:  Lab Results   Component Value Date    TSH 0.260 (L) 10/21/2019    INR 1.2 10/24/2019    LABA1C 9.9 (H) 07/21/2022       Microbiology:  Pending  Recent Labs     07/20/22  1613   BC 24 Hours no growth     Recent Labs     07/20/22  2326   ORG Lactobacillus*     Recent Labs     07/20/22  1613 BLOODCULT2 24 Hours no growth     Recent Labs     07/20/22  2326   STREPNEUMAGU Presumptive negative- suggests no current or recent  pneumococcal infection. Infection due to Strep pneumoniae cannot be  ruled out since the antigen present in the sample  may be below the detection limit of the test.  Normal Range:Presumptive Negative       Recent Labs     07/20/22  2326   LP1UAG Presumptive Negative -suggesting no recent or current infections  with Legionella pneumophila serogroup 1. Infection to Legionella cannot be ruled out since other serogroups  and species may cause infection, antigen may not be present in  early infection, or level of antigen may be below the  detection limit. Normal Range: Presumptive Negative       No results for input(s): ASO in the last 72 hours. No results for input(s): CULTRESP in the last 72 hours. Recent Labs     07/20/22  2221   PROCAL 82.64*       Radiology:  CT ABDOMEN PELVIS WO CONTRAST Additional Contrast? 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 No drainable fluid collection. Clinical correlation recommended. No renal or ureteral stone. No hydronephrosis. Cholelithiasis without CT evidence of acute cholecystitis. Critical results were called by Dr. Berkley Montero MD to Dr. Audrey Castro on 7/20/2022 at 22:22. CT HEAD WO CONTRAST    Result Date: 7/20/2022  No acute intracranial abnormality. XR CHEST PORTABLE    Result Date: 7/23/2022  Interval worsening of the aeration of the mid and lower lung fields bilaterally. Lines and tubes are stable. Small left pleural effusion unchanged     XR CHEST PORTABLE    Result Date: 7/22/2022  Hazy left basilar opacities, concerning for pneumonia/aspiration. XR CHEST PORTABLE    Result Date: 7/21/2022  Endotracheal tube extends into the proximal right main bronchus. Recommend repositioning. Right upper extremity PICC, with tip projecting over the cavoatrial junction. The findings were sent to the Radiology Results [address] at 4:14 am on 7/21/2022 to be communicated to a licensed caregiver. XR CHEST PORTABLE    Result Date: 7/20/2022  No acute process. Cardiomegaly. 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 *       PLAN:    1. Patient remains orally intubated  2. Pressor support and ventilator support  3. Necrotizing fasciitis involving abdomen and perineum now status post OR  4. Critical care support including steroids and antibiotics  5. Prognosis guarded      Diet: Diet NPO Exceptions are: Sips of Water with Meds  Code Status: Limited  PT/OT Eval Status:   Unable yet  DVT Prophylaxis:   In place  Recommended disposition at discharge: To be determined    +++++++++++++++++++++++++++++++++++++++++++++++++  Jessica Sood MD   [facility].  +++++++++++++++++++++++++++++++++++++++++++++++++  NOTE: This report was transcribed using voice recognition software. Every effort was made to ensure accuracy; however, inadvertent computerized transcription errors may be present.

## 2022-07-24 NOTE — PROGRESS NOTES
bacteria seen. Initial ABGs showed pH of 7.332, CO2 33.5 and bicarb 17.3    An EKG showed normal sinus rhythm, with possible left atrial enlargement, septal infarct age undetermined, possible lateral infarct age undetermined. A CT of the head was performed and showed no acute intercranial abnormality. 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 Chest x-ray showed no acute process and cardiomegaly      Subjective    Remains intubated. She is also sedated. July 24  Hypotensive. Still with dropping urine output. Bedside nurse has been able to get a occasional squeeze out of her upper limbs    Exam:  BP (!) 90/50   Pulse 66   Temp 97.7 °F (36.5 °C) (Esophageal)   Resp 27   Ht 5' 4\" (1.626 m)   Wt 258 lb 11.2 oz (117.3 kg)   SpO2 (!) 87%   BMI 44.41 kg/m²   General appearance: No apparent distress, HEENT: Pupils equal, round, and reactive to light. Conjunctivae/corneas clear. Neck: Short and thick   Orally intubated  Respiratory distant  Cardiovascular: Regular rate and rhythm with normal S1/S2 without murmurs, rubs or gallops. Abdomen: Large postop area covered with bandages on the right   side musculoskeletal: No clubbing, cyanosis tr edema bilaterally.    Neurologic: Sedated    Medications:  Reviewed    Infusion Medications    dextrose      sodium bicarbonate infusion 100 mL/hr at 07/24/22 1026    EPINEPHrine infusion 12 mcg/min (07/24/22 0709)    sodium chloride      sodium chloride      sodium chloride      vasopressin (Septic Shock) infusion 0.04 Units/min (07/24/22 0600)    fentaNYL 100 mcg/hr (07/24/22 0600)    dexmedetomidine (PRECEDEX) IV infusion 0.3 mcg/kg/hr (07/23/22 1343)    norepinephrine 45 mcg/min (07/24/22 0707)     Scheduled Medications    calcium gluconate IVPB  4,000 mg IntraVENous Once    insulin lispro  0-16 Units SubCUTAneous Q4H sodium hypochlorite   Irrigation Daily    hydrocortisone sodium succinate PF  100 mg IntraVENous Q8H    sodium chloride flush  5-40 mL IntraVENous 2 times per day    heparin (porcine)  5,000 Units SubCUTAneous 3 times per day    polyethylene glycol  17 g Oral Daily    FLUoxetine  20 mg Oral Daily    chlorhexidine  15 mL Mouth/Throat BID    polyvinyl alcohol  1 drop Both Eyes Q4H    And    artificial tears   Both Eyes Q4H    clindamycin (CLEOCIN) IV  900 mg IntraVENous Q8H    piperacillin-tazobactam  4,500 mg IntraVENous Q12H    vancomycin (VANCOCIN) intermittent dosing (placeholder)   Other RX Placeholder    pantoprazole (PROTONIX) 40 mg injection  40 mg IntraVENous Daily    rosuvastatin  10 mg Oral Daily     PRN Meds: glucose, dextrose bolus **OR** dextrose bolus, glucagon (rDNA), dextrose, sodium chloride, acetaminophen, sodium chloride, sodium chloride flush, sodium chloride, ondansetron **OR** ondansetron, potassium chloride, magnesium sulfate    I/O    Intake/Output Summary (Last 24 hours) at 7/24/2022 1029  Last data filed at 7/24/2022 0853  Gross per 24 hour   Intake 9579.57 ml   Output 58 ml   Net 9521.57 ml         Labs:   Recent Labs     07/23/22  0605 07/23/22  1550 07/23/22  2305 07/24/22  0410   WBC 23.9* 26.0*  --  28.2*   HGB 9.6* 8.2* 6.8* 8.9*   HCT 27.9* 23.3* 19.6* 25.0*   * 106*  --  102*         Recent Labs     07/23/22  1825 07/23/22  2305 07/24/22  0410   * 123* 122*  121*   K 4.8 4.5 4.7  4.7   CL 92* 91* 90*  90*   CO2 20* 19* 20*  19*   BUN 56* 54* 54*  55*   CREATININE 3.0* 3.1* 3.1*  3.2*   CALCIUM 6.4* 6.6* 6.7*  6.7*   PHOS 4.9* 4.5 4.6*         Recent Labs     07/22/22  0600 07/23/22  0605 07/24/22  0410   PROT 4.7* 4.2* 4.2*   ALKPHOS 144* 429* 491*   ALT 52* 43* 39*   * 115* 123*   BILITOT 1.0 1.2 1.1         No results for input(s): INR in the last 72 hours. No results for input(s): Cherelle Norman in the last 72 hours.     Chronic labs:  Lab Results Component Value Date    TSH 0.260 (L) 10/21/2019    INR 1.2 10/24/2019    LABA1C 9.9 (H) 07/21/2022       Microbiology:  Pending  No results for input(s): BC in the last 72 hours. No results for input(s): ORG in the last 72 hours. No results for input(s): Donold Mooring in the last 72 hours. No results for input(s): STREPNEUMAGU in the last 72 hours. No results for input(s): LP1UAG in the last 72 hours. No results for input(s): ASO in the last 72 hours. No results for input(s): CULTRESP in the last 72 hours. No results for input(s): PROCAL in the last 72 hours. Radiology:  CT ABDOMEN PELVIS WO CONTRAST Additional Contrast? 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 No drainable fluid collection. Clinical correlation recommended. No renal or ureteral stone. No hydronephrosis. Cholelithiasis without CT evidence of acute cholecystitis. Critical results were called by Dr. Shannan Mcleod MD to Dr. Tyler Garcia on 7/20/2022 at 22:22. CT HEAD WO CONTRAST    Result Date: 7/20/2022  No acute intracranial abnormality. XR CHEST PORTABLE    Result Date: 7/23/2022  Interval worsening of the aeration of the mid and lower lung fields bilaterally. Lines and tubes are stable. Small left pleural effusion unchanged     XR CHEST PORTABLE    Result Date: 7/22/2022  Hazy left basilar opacities, concerning for pneumonia/aspiration. XR CHEST PORTABLE    Result Date: 7/21/2022  Endotracheal tube extends into the proximal right main bronchus. Recommend repositioning. Right upper extremity PICC, with tip projecting over the cavoatrial junction. The findings were sent to the Radiology Results [id number] at 4:14 am on 7/21/2022 to be communicated to a licensed caregiver. XR CHEST PORTABLE    Result Date: 7/20/2022  No acute process. Cardiomegaly. 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 *       PLAN:    1. Patient remains orally intubated  2. Pressor support and ventilator support  3. Necrotizing fasciitis involving abdomen and perineum now status post OR  4. Critical care support including steroids and antibiotics  5. Prognosis guarded  July 24  She is still looking fairly sick. Remains critical.  Renal service is following. May have to start CVVH if family is agreeable. White count is worse than before. Discussed with family at bedside. No change in CODE STATUS of DNR type yet        Diet: Diet NPO Exceptions are: Sips of Water with Meds  Code Status: Limited  PT/OT Eval Status:   Unable yet  DVT Prophylaxis:   In place  Recommended disposition at discharge: To be determined    +++++++++++++++++++++++++++++++++++++++++++++++++  Addison Wooten MD   [facility].  +++++++++++++++++++++++++++++++++++++++++++++++++  NOTE: This report was transcribed using voice recognition software. Every effort was made to ensure accuracy; however, inadvertent computerized transcription errors may be present.

## 2022-07-24 NOTE — PROGRESS NOTES
0010 -- -- -- 67 21 100 % --   07/24/22 0700 -- -- -- 71 28 96 % --   07/24/22 0600 (!) 124/51 97.5 °F (36.4 °C) Esophageal 71 18 100 % 258 lb 11.2 oz (117.3 kg)   07/24/22 0500 -- -- -- 69 24 99 % --   07/24/22 0400 96/76 98.4 °F (36.9 °C) Esophageal 69 19 100 % --   07/24/22 0310 -- -- -- 70 19 100 % --   07/24/22 0300 (!) 113/47 -- -- 69 22 100 % --   07/24/22 0200 (!) 131/42 97.7 °F (36.5 °C) Esophageal 70 21 100 % --   07/24/22 0100 107/60 -- -- 68 22 100 % --   07/24/22 0030 (!) 113/50 97.2 °F (36.2 °C) Esophageal 67 19 100 % --   07/24/22 0015 (!) 109/46 97.2 °F (36.2 °C) Esophageal 68 15 100 % --   07/24/22 0000 (!) 119/52 (!) 96.4 °F (35.8 °C) Esophageal 68 18 100 % --   07/23/22 2331 -- -- -- 66 18 100 % --   07/23/22 2300 (!) 105/54 -- -- 66 15 100 % --   07/23/22 2200 (!) 112/54 (!) 95.9 °F (35.5 °C) Esophageal 65 14 100 % --   07/23/22 2100 (!) 109/52 -- -- 65 16 91 % --   07/23/22 2000 (!) 100/52 96.8 °F (36 °C) Esophageal 63 14 100 % --   07/23/22 1943 -- -- -- 62 14 100 % --   07/23/22 1938 (!) 95/41 -- -- 63 13 100 % --   07/23/22 1928 (!) 85/45 -- -- 63 13 100 % --   07/23/22 1920 (!) 88/38 -- -- 63 16 100 % --   07/23/22 1915 (!) 103/42 -- -- 62 13 100 % --   07/23/22 1908 (!) 95/41 -- -- 62 14 100 % --   07/23/22 1903 (!) 84/40 -- -- 62 14 100 % --   07/23/22 1900 (!) 91/55 (!) 96.6 °F (35.9 °C) Esophageal 63 15 100 % --   07/23/22 1845 (!) 94/31 -- -- 62 14 100 % --   07/23/22 1800 (!) 119/34 -- -- 61 14 100 % --   07/23/22 1738 (!) 114/56 -- -- 60 15 97 % --   07/23/22 1729 (!) 92/58 -- -- 65 14 100 % --   07/23/22 1713 97/61 -- -- 62 13 97 % --   07/23/22 1700 (!) 83/46 96.8 °F (36 °C) Esophageal 61 14 95 % --   07/23/22 1655 -- -- -- 67 15 98 % --   07/23/22 1648 (!) 98/37 -- -- 63 14 100 % --   07/23/22 1637 85/61 -- -- 64 14 100 % --   07/23/22 1622 (!) 71/60 -- -- 59 15 100 % --   07/23/22 1615 (!) 129/34 97 °F (36.1 °C) -- 57 14 100 % --   07/23/22 1605 (!) 129/34 97 °F (36.1 °C) -- 58 16 98 % --   07/23/22 1600 (!) 129/34 97 °F (36.1 °C) Esophageal 60 15 99 % --   07/23/22 1500 (!) 110/32 -- -- 56 17 100 % --   07/23/22 1400 (!) 146/43 (!) 96.6 °F (35.9 °C) Esophageal 55 14 100 % --   07/23/22 1300 (!) 124/56 (!) 96.6 °F (35.9 °C) Esophageal 55 15 100 % --   07/23/22 1245 -- -- -- 55 15 100 % --   07/23/22 1200 (!) 124/50 (!) 96.6 °F (35.9 °C) Esophageal 56 16 100 % --   07/23/22 1100 -- (!) 96.4 °F (35.8 °C) Esophageal 57 14 -- --   07/23/22 1000 -- 96.9 °F (36.1 °C) Axillary 58 14 100 % --   @      Intake/Output Summary (Last 24 hours) at 7/24/2022 0942  Last data filed at 7/24/2022 0853  Gross per 24 hour   Intake 9579.57 ml   Output 71 ml   Net 9508.57 ml         Wt Readings from Last 3 Encounters:   07/24/22 258 lb 11.2 oz (117.3 kg)   04/27/22 208 lb 8 oz (94.6 kg)   03/17/22 187 lb (84.8 kg)       Constitutional:  Pt is intubated  Head: normocephalic, atraumatic  Neck: no JVD  Cardiovascular: regular rate and rhythm, no murmurs, gallops, or rubs  Respiratory:  No rales, rhochi, or wheezes  Gastrointestinal:  Soft, nontender, nondistended, bowel sounds x 4  Ext: edema  Skin: dry, no rash  Neuro: sedated    MEDS (scheduled):    calcium gluconate IVPB  4,000 mg IntraVENous Once    insulin lispro  0-16 Units SubCUTAneous Q4H    sodium hypochlorite   Irrigation Daily    hydrocortisone sodium succinate PF  100 mg IntraVENous Q8H    sodium chloride flush  5-40 mL IntraVENous 2 times per day    heparin (porcine)  5,000 Units SubCUTAneous 3 times per day    polyethylene glycol  17 g Oral Daily    FLUoxetine  20 mg Oral Daily    chlorhexidine  15 mL Mouth/Throat BID    polyvinyl alcohol  1 drop Both Eyes Q4H    And    artificial tears   Both Eyes Q4H    clindamycin (CLEOCIN) IV  900 mg IntraVENous Q8H    piperacillin-tazobactam  4,500 mg IntraVENous Q12H    vancomycin (VANCOCIN) intermittent dosing (placeholder)   Other RX Placeholder    pantoprazole (PROTONIX) 40 mg injection  40 mg IntraVENous Daily rosuvastatin  10 mg Oral Daily       MEDS (infusions):   dextrose      sodium bicarbonate infusion      EPINEPHrine infusion 12 mcg/min (07/24/22 0709)    sodium chloride      sodium chloride      sodium chloride      vasopressin (Septic Shock) infusion 0.04 Units/min (07/24/22 0600)    fentaNYL 100 mcg/hr (07/24/22 0600)    dexmedetomidine (PRECEDEX) IV infusion 0.3 mcg/kg/hr (07/23/22 1343)    norepinephrine 45 mcg/min (07/24/22 0707)       MEDS (prn):  glucose, dextrose bolus **OR** dextrose bolus, glucagon (rDNA), dextrose, sodium chloride, acetaminophen, sodium chloride, sodium chloride flush, sodium chloride, ondansetron **OR** ondansetron, potassium chloride, magnesium sulfate    DATA:    Recent Labs     07/23/22  0605 07/23/22  1550 07/23/22  2305 07/24/22  0410   WBC 23.9* 26.0*  --  28.2*   HGB 9.6* 8.2* 6.8* 8.9*   HCT 27.9* 23.3* 19.6* 25.0*   MCV 79.7* 79.5*  --  79.1*   * 106*  --  102*     Recent Labs     07/22/22  0600 07/22/22  1000 07/23/22  0605 07/23/22  1033 07/23/22  1825 07/23/22  2305 07/24/22  0410   *   < > 125*   < > 123* 123* 122*  121*   K 4.9  4.9   < > 5.0  5.0   < > 4.8 4.5 4.7  4.7   CL 92*   < > 93*   < > 92* 91* 90*  90*   CO2 17*   < > 16*   < > 20* 19* 20*  19*   BUN 64*   < > 57*   < > 56* 54* 54*  55*   CREATININE 3.3*   < > 2.9*   < > 3.0* 3.1* 3.1*  3.2*   LABGLOM 17   < > 19   < > 18 18 18  17   GLUCOSE 295*   < > 281*   < > 244* 185* 97  97   CALCIUM 6.7*   < > 6.3*   < > 6.4* 6.6* 6.7*  6.7*   ALT 52*  --  43*  --   --   --  39*   *  --  115*  --   --   --  123*   BILITOT 1.0  --  1.2  --   --   --  1.1   ALKPHOS 144*  --  429*  --   --   --  491*   MG 1.9   < > 1.9   < > 1.9 2.0 2.0   PHOS 3.8   < > 4.2   < > 4.9* 4.5 4.6*    < > = values in this interval not displayed.        Lab Results   Component Value Date    LABALBU 1.1 (L) 07/24/2022    LABALBU 1.4 (L) 07/23/2022    LABALBU 1.2 (L) 07/22/2022     Lab Results   Component Value Date

## 2022-07-24 NOTE — PROGRESS NOTES
Pharmacy Consultation Note  (Antibiotic Dosing and Monitoring)    Initial consult date: 7/21/22  Consulting physician/provider: Dr Aroldo Dickinson  Drug: Vancomycin  Indication: NSTI    Age/  Gender Height Weight IBW  Allergy Information   72 y.o./female 5' 4\" (162.6 cm) 208 lb (94.3 kg)     Ideal body weight: 54.7 kg (120 lb 9.5 oz)  Adjusted ideal body weight: 79.8 kg (175 lb 13.4 oz)   Patient has no known allergies. Renal Function:  Recent Labs     07/23/22  1825 07/23/22  2305 07/24/22  0410   BUN 56* 54* 54*  55*   CREATININE 3.0* 3.1* 3.1*  3.2*         Intake/Output Summary (Last 24 hours) at 7/24/2022 1258  Last data filed at 7/24/2022 1100  Gross per 24 hour   Intake 8292.29 ml   Output 52 ml   Net 8240.29 ml         Vancomycin Monitoring:  Trough:  No results for input(s): VANCOTROUGH in the last 72 hours. Random:    Recent Labs     07/22/22  0005 07/23/22  0605 07/24/22  0410   VANCORANDOM 16.1 21.6 29.5         Vancomycin Administration Times:  Recent vancomycin administrations                     vancomycin (VANCOCIN) 1,000 mg in dextrose 5 % 250 mL IVPB ([id number]) (mg) 1,000 mg New Bag 07/23/22 2122    vancomycin 1500 mg in dextrose 5% 300 mL IVPB (mg) 1,500 mg New Bag 07/22/22 0802                      Assessment:  Patient is a 74 y.o. female who has been initiated on vancomycin  Estimated Creatinine Clearance: 19 mL/min (A) (based on SCr of 3.2 mg/dL (H)). To dose vancomycin, pharmacy will be utilizing dosing based off of levels because of patient's renal impairment/insufficiency  Vancomycin random 21.6 mcg/mL from 0605     Plan:   Will hold additional vancomycin today  Will check vancomycin random level with AM labs tomorrow  Will continue to monitor renal function   Clinical pharmacy to follow    Carla Linder PharmD, BCCCP  7/24/2022  12:58 PM

## 2022-07-24 NOTE — PROGRESS NOTES
07/24/22 0819   NICU Vent Information   Skin Assessment Clean, dry, & intact   Ventilator ID [id number]   Equipment Changed Humidification   Vent Type 980   Vent Mode AC/VC   Vt (Set, mL) 450 mL   Vt Exhaled 448 mL   Resp Rate (Set) 14 bmp   Rate Measured 13 br/min   Minute Volume 5.77 Liters   Peak Flow 60 L/min   Pressure Support 0 cmH20   FiO2  40 %   Peak Inspiratory Pressure 37 cmH2O   I:E Ratio 1:4.20   Sensitivity 3   PEEP/CPAP (cmH2O) 5   Mean Airway Pressure 11 cmH20   Plateau Pressure 32 [id number]   Static Compliance 14 mL/cmH2O   Total PEEP 7.3 cmH20   Auto PEEP 2.3 cmH20   Cough/Sputum   Sputum How Obtained Endotracheal;Suctioned   Cough Productive   Frequency Infrequent   Sputum Amount Small   Sputum Color White;Cloudy   Tenacity Thick   Breath Sounds   Right Upper Lobe Rhonchi;Fine Crackles   Right Middle Lobe Fine Crackles   Right Lower Lobe Fine Crackles   Left Upper Lobe Rhonchi;Fine Crackles   Additional Respiratory Assessments   Heart Rate 67   Resp 21   SpO2 100 %   Position Semi-Acharya's   Humidification Source Heated wire   Humidification Temp 36.9   Circuit Condensation Drained   Cuff Pressure (cm H2O) 29 cm H2O   Vent Alarm Settings   High Pressure  50 cmH2O   Low Minute Volume Alarm 5 L/min   Ve Max 17   Ve Min 5   Low Exhaled Vt  350 mL   Vt Low  350 mL   Vt High  800 mL   RR High 30 br/min   Apnea (Secs) 20 secs   Ambu Bag With Mask At Bedside Yes   NICU Ventilator Associated Pneumonia Bundle   Elevation of Head of Bed Yes   Oral Care Completed Yes   Oral Care Performed Mouth suctioned   Oral Suctioning Yes   ETT/Trach Suctioning Yes   ETT (adult)   Placement Date/Time: 07/21/22 0115   Present on Admission/Arrival: No  Placed By: In surgery  Placement Verified By: Auscultation;Capnometry  Mask Ventilation: Mask ventilation not attempted (0)  Technique: Rapid sequence;Video laryngoscopy  Airway Ty. ..    Secured At 23 cm   Measured From Lips   ETT Placement Center   Secured By Commercial tube atwood

## 2022-07-24 NOTE — PROGRESS NOTES
Palliative Care Department  [phone]  Palliative Care Progress Note  Provider RADHA Garcia - CNP      PATIENT: Savanah Kinney  : 1950  MRN: [id number]  ADMISSION DATE: 2022  3:13 PM  Referring Provider: Elana Davies MD    Palliative Medicine was consulted on hospital day 3 for assistance with Goals of care, Code Status Discussion    HPI:     Doris Bonilla is a 74 y.o. y/o female with a history of diabetes type 2, CHF, EF of 45 to 50%, fibromyalgia, glaucoma, hypertension who presented to [facility]) on 2022 from home with altered mental status. In the emergency room, laboratory findings shows lactic acid of 7.4, proBNP of 7528, cortisol level 33.69, BUN 61, creatinine 3.4, potassium 5.9. CT of the abdomen and pelvis shows soft tissues gas along the right perineum extending into the anterior lower abdominal wall, and right gluteal region concerning for Bakari's gangrene. Patient was also found to be hypotensive and bradycardic, Levophed and epinephrine were initiated. Patient required to be intubated, but patient's POA stated the patient does not want to be intubated. CODE STATUS was addressed at the moment, and she is a limited code. Neurosurgery was consulted, on 2022 patient had a excisional debridement of abdomen and perineum with findings of necrotizing fasciitis. Patient is currently intubated and sedated in ICU. 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   Capacity is time limited and situation/question specific  During encounter, Tara Betancourt, daughter, was surrogate medical decision-maker  Outcome of goals of care meetin [address] to limited code: okay for intubation and resuscitative medications only  Plan to withdraw care tomorrow    Code status Limited : okay for intubation and resuscitative medications only  Advanced Directives: no POA or living will in [facility]  Surrogate/Legal NOK:  Naman Winter 22 [id number]  Slade Chapa (Granddaughter) [phone]    Spiritual assessment: no spiritual distress identified  Bereavement and grief: to be determined  Referrals to: none today    Thank you for the opportunity to participate in the care of Ludin Villavicencio. RADHA Mathis CNP  Palliative Medicine     SUBJECTIVE:     Details of Conversation:   Family meeting at the bedside with patient's daughter, Sujatha Kirk, grandchild, and friend. Sujatha Kirk explains that she is patient's healthcare power of , states she will bring paperwork to the bedside. Patient is not  and Sujatha Kirk is patient's only living child. Sujatha Kirk is patient's legal next of kin. Sujatha Kirk states that she has been talking to patient's grandchildren and they have decided that patient would not want to continue to live like this. Plan is to change CODE STATUS to limited code: okay for intubation and resuscitative medications only and proceed with compassionate extubation tomorrow. Sujatha Kirk is going to discuss with the rest of the family a time for compassionate extubation tomorrow. Support provided and questions answered.     Prognosis: Guarded    OBJECTIVE:     BP 96/64   Pulse 64   Temp 97.7 °F (36.5 °C) (Esophageal)   Resp 19   Ht 5' 4\" (1.626 m)   Wt 258 lb 11.2 oz (117.3 kg)   SpO2 100%   BMI 44.41 kg/m²     Physical Examination:  Gen: Elderly, intermittently opens eyes, ill-appearing  Lungs: Intubated  Heart: RRR  Abdomen: Soft, nondistended  Extremities: +edema  Skin: Warm, dry  Neuro: Intubated and sedated, responsive to voice    Objective data reviewed: labs, images,

## 2022-07-24 NOTE — PROGRESS NOTES
07/24/22 1539   [facility] Vent Information   Skin Assessment Clean, dry, & intact   Ventilator ID [id number]   Vent Type 980   Vent Mode AC/VC   Vt (Set, mL) 450 mL   Vt Exhaled 472 mL   Resp Rate (Set) 14 bmp   Rate Measured 20 br/min   Minute Volume 7.14 Liters   Peak Flow 60 L/min   Pressure Support 0 cmH20   FiO2  40 %   Peak Inspiratory Pressure 40 cmH2O   I:E Ratio 1:3.70   Sensitivity 3   PEEP/CPAP (cmH2O) 5   Mean Airway Pressure 13 cmH20   Plateau Pressure 36 [id number]   Static Compliance 12 mL/cmH2O   Total PEEP 6.8 cmH20   Auto PEEP 2.6 cmH20   Cough/Sputum   Sputum How Obtained Endotracheal;Suctioned   Cough Productive;Weak   Frequency Infrequent   Sputum Amount Moderate   Sputum Color White;Cloudy   Tenacity Thick   Breath Sounds   Right Upper Lobe Rhonchi;Crackles   Right Middle Lobe Crackles   Right Lower Lobe Crackles   Left Upper Lobe Rhonchi;Crackles   Left Lower Lobe Crackles   Additional Respiratory Assessments   Heart Rate 64   Resp 15   SpO2 100 %   Position Semi-Acharya's   Humidification Source Heated wire   Humidification Temp 37.1   Circuit Condensation Drained   Vent Alarm Settings   High Pressure  50 cmH2O   Low Minute Volume Alarm 5 L/min   Ve Max 17   Ve Min 5   Low Exhaled Vt  350 mL   Vt Low  350 mL   Vt High  800 mL   RR High 30 br/min   Apnea (Secs) 20 secs   ETT (adult)   Placement Date/Time: 07/21/22 0115   Present on Admission/Arrival: No  Placed By: In surgery  Placement Verified By: Auscultation;Capnometry  Mask Ventilation: Mask ventilation not attempted (0)  Technique: Rapid sequence;Video laryngoscopy  Airway Ty. ..    Secured At 22 cm   Measured From Lips   Secured By Commercial tube atwood

## 2022-07-24 NOTE — PLAN OF CARE
Problem: Discharge Planning  Goal: Discharge to home or other facility with appropriate resources  Outcome: Not Progressing  Note: Family planning to withdraw care, tomorrow.  7-       Problem: Pain  Goal: Verbalizes/displays adequate comfort level or baseline comfort level  Outcome: Progressing  Flowsheets (Taken 7/24/2022 1844)  Verbalizes/displays adequate comfort level or baseline comfort level: Assess pain using appropriate pain scale     Problem: Skin/Tissue Integrity  Goal: Absence of new skin breakdown  Outcome: Progressing     Problem: Chronic Conditions and Co-morbidities  Goal: Patient's chronic conditions and co-morbidity symptoms are monitored and maintained or improved  Outcome: Not Progressing  Flowsheets (Taken 7/24/2022 0800 by Melvi Cantu)  Care Plan - Patient's Chronic Conditions and Co-Morbidity Symptoms are Monitored and Maintained or Improved:   Monitor and assess patient's chronic conditions and comorbid symptoms for stability, deterioration, or improvement   Collaborate with multidisciplinary team to address chronic and comorbid conditions and prevent exacerbation or deterioration  Note: Family withdrawing care 7-     Problem: Nutrition Deficit:  Goal: Optimize nutritional status  Outcome: Not Progressing  Note: Multiple pressors      Problem: Respiratory - Adult  Goal: Achieves optimal ventilation and oxygenation  Outcome: Progressing  Flowsheets (Taken 7/24/2022 0800 by Melvi Cantu)  Achieves optimal ventilation and oxygenation:   Assess for changes in respiratory status   Assess for changes in mentation and behavior   Position to facilitate oxygenation and minimize respiratory effort   Oxygen supplementation based on oxygen saturation or arterial blood gases   Assess the need for suctioning and aspirate as needed   Assess and instruct to report shortness of breath or any respiratory difficulty   Respiratory therapy support as indicated     Problem: Safety - Adult  Goal: Free from fall injury  Outcome: Progressing  Flowsheets (Taken 7/24/2022 0800 by Abbey Tadeo)  Free From Fall Injury: Maite Izzy family/caregiver on patient safety     Problem: ABCDS Injury Assessment  Goal: Absence of physical injury  Outcome: Progressing  Flowsheets (Taken 7/24/2022 0800 by Abbey Tadeo)  Absence of Physical Injury: Implement safety measures based on patient assessment     Problem: Discharge Planning  Goal: Discharge to home or other facility with appropriate resources  Outcome: Not Progressing  Note: Family planning to withdraw care, tomorrow. 7-       Problem: Chronic Conditions and Co-morbidities  Goal: Patient's chronic conditions and co-morbidity symptoms are monitored and maintained or improved  Outcome: Not Progressing  Flowsheets (Taken 7/24/2022 0800 by Abbey Tadeo)  Care Plan - Patient's Chronic Conditions and Co-Morbidity Symptoms are Monitored and Maintained or Improved:   Monitor and assess patient's chronic conditions and comorbid symptoms for stability, deterioration, or improvement   Collaborate with multidisciplinary team to address chronic and comorbid conditions and prevent exacerbation or deterioration  Note: Family withdrawing care 7-     Problem: Nutrition Deficit:  Goal: Optimize nutritional status  Outcome: Not Progressing  Note: Multiple pressors    Plan of care discussed with patient/family. Patient/family incorporated into plan of care.

## 2022-07-24 NOTE — PROGRESS NOTES
[facility]  SURGICAL INTENSIVE CARE UNIT    CRITICAL CARE ATTENDING PROGRESS NOTE    I have examined the patient, reviewed the record, and discussed the case with the APN/  Resident. I have reviewed all relevant labs and imaging data. Please refer to the  APN/ resident's note. I agree with the  assessment and plan with the following corrections/ additions. The following summarizes my clinical findings and independent assessment. CC: Septic shock after NSTI    HOSPITAL COURSE:  7/21 excisional debridement of perineum and right buttock by Dr. Chato Stauffer. Patient remains on 2 pressors and insulin drip  7/22 remains on 2 pressors vasopressin and Levophed. Also remains on insulin drip. 7/24 remains on 3 pressors. 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 *       PLAN:  Sedation/ Pain: Continue Precedex and fentanyl drip    CV: Septic shock-currently on 3 pressors. Remains on epinephrine drip at 12 mics per minute, Levophed drip at 45 mics per minute and vasopressin at 0.04 units/min  Titrate for MAP greater than 65 wean pressors as able. Pulmonary: Acute respiratory failure-continue full vent support    GI: N.p.o.  Status postdebridement of perineum genitalia on 7/21 for necrotizing soft tissue infection  Status post second excisional debridement of abdominal wall on July 22  Continue dressing changes daily    FEN: Acute renal injury-continue IV fluids.   Continue bicarb drip  With her drips, she is approaching almost 250 cc/h of fluids  Decrease bicarb drip to 100 cc/h  Patient is anuric and now-90 cc urine in 24 hours  Overall patient is 30 L positive  Nephrology has been consulted for CVVHD  Family is still deciding on renal replacement therapy      ID: Continue Zosyn, clinda, vancomycin for necrotizing soft tissue infection. Preliminary cultures are growing strep. Await full sensitivities    Heme: Monitor CBC    Endo: Monitor Blood Sugars. Target blood glucose less than 180 in the ICU. Patient is a poorly controlled diabetic. Her hemoglobin A1c is 9.9. DKA resolved. Weaned off insulin drip    Cortisol 10-continue hydrocortisone 100 mg IV every 8 stress steroids    DVT prophylaxis--SCDS, heparin 3 times daily  GI Prophylaxis--Protonix  Lines--arterial line, central line 7/21    CODE: Limited code-no intubation/reintubation, okay for defib cardioversion chest compression recessive medications     DISPOSITION-Continue ICU Care    Critical care time exclusive of teaching and procedures =41min     I provided critical care to a patient with septic shock and diabetic ketoacidosis in the setting of necrotizing soft tissue infection, multisystem organ failure requiring frequent and emergent imaging, lab studies, intensive monitoring, data review, and adjusting the clinical plan as well as urgent coordination with multiple specialists. Pt needs continuous ICU monitoring because the patient is at risk for deterioration from a multisystem organ failure standpoint    Family to come in today to discuss further goals of care. Daughter is unsure if her mom would want dialysis. Patient is now anuric and not making urine. Patient remains critical    Kory Forrester MD, [facility]  7/24/2022  9:16 AM    NOTE: This report was transcribed using voice recognition software. Every effort was made to ensure accuracy; however, inadvertent computerized transcription errors may be present.

## 2022-07-24 NOTE — PLAN OF CARE
Problem: Discharge Planning  Goal: Discharge to home or other facility with appropriate resources  Outcome: Not Progressing     Problem: Pain  Goal: Verbalizes/displays adequate comfort level or baseline comfort level  Outcome: Progressing     Problem: Skin/Tissue Integrity  Goal: Absence of new skin breakdown  Outcome: Progressing     Problem: Safety - Medical Restraint  Goal: Remains free of injury from restraints (Restraint for Interference with Medical Device)  Outcome: Completed     Problem: Chronic Conditions and Co-morbidities  Goal: Patient's chronic conditions and co-morbidity symptoms are monitored and maintained or improved  Outcome: Not Progressing     Problem: Nutrition Deficit:  Goal: Optimize nutritional status  Outcome: Not Progressing     Problem: Respiratory - Adult  Goal: Achieves optimal ventilation and oxygenation  Outcome: Progressing  Flowsheets (Taken 7/23/2022 0800 by Abbey Tadeo)  Achieves optimal ventilation and oxygenation:   Assess for changes in respiratory status   Assess for changes in mentation and behavior   Position to facilitate oxygenation and minimize respiratory effort   Oxygen supplementation based on oxygen saturation or arterial blood gases   Assess the need for suctioning and aspirate as needed   Assess and instruct to report shortness of breath or any respiratory difficulty   Respiratory therapy support as indicated     Problem: Safety - Adult  Goal: Free from fall injury  Outcome: Progressing  Flowsheets (Taken 7/23/2022 0800 by Abbey Tadeo)  Free From Fall Injury: Instruct family/caregiver on patient safety     Problem: ABCDS Injury Assessment  Goal: Absence of physical injury  Outcome: Progressing  Flowsheets (Taken 7/23/2022 0800 by Abbey Tadeo)  Absence of Physical Injury: Implement safety measures based on patient assessment     Problem: Discharge Planning  Goal: Discharge to home or other facility with appropriate resources  Outcome: Not Progressing Problem: Chronic Conditions and Co-morbidities  Goal: Patient's chronic conditions and co-morbidity symptoms are monitored and maintained or improved  Outcome: Not Progressing     Problem: Nutrition Deficit:  Goal: Optimize nutritional status  Outcome: Not Progressing   Plan of care discussed with patient/family. Patient/family incorporated into plan of care.

## 2022-07-25 PROBLEM — N17.9 ACUTE RENAL FAILURE (ARF) (HCC): Status: ACTIVE | Noted: 2022-01-01

## 2022-07-25 PROBLEM — E87.1 HYPONATREMIA: Status: ACTIVE | Noted: 2022-01-01

## 2022-07-25 PROBLEM — E83.51 HYPOCALCEMIA: Status: ACTIVE | Noted: 2022-01-01

## 2022-07-25 PROBLEM — E87.5 HYPERKALEMIA: Status: ACTIVE | Noted: 2022-01-01

## 2022-07-25 PROBLEM — E83.39 HYPERPHOSPHATEMIA: Status: ACTIVE | Noted: 2022-01-01

## 2022-07-25 PROBLEM — E88.09 HYPOALBUMINEMIA: Status: ACTIVE | Noted: 2022-01-01

## 2022-07-25 NOTE — PROGRESS NOTES
Hospitalist Progress Note      Synopsis: Patient admitted on 7/20/2022    Patient admitted for blood sugar of 305, increased abdominal pain and \"lethargy\" that occurred suddenly yesterday afternoon. Patient states that she has been experiencing low back/hip pain for the last month that is moderate in severity, worsened and relieved by nothing. Additionally, patient complains of abdominal pain that has been worsening over the last week. She was scheduled to go to her PCP on day of complaint but was too weak to leave her home. Patient denies chest pain, shortness of breath, fever, aches, chills. Patient was hypotensive upon arrival to the ED and was started on Levophed and epinephrine. Patient does have history of CHF with EF of 45 to 50%, diverticulitis and diabetes. 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 Surgery was consulted and patient was taken to surgery for excisional debridement of perineum, right buttocks, mons pubis, and possible lower abdominal wall. Patient was admitted to medicine for further observation and management of sepsis. Course significant for BMP with an decreased sodium of 131, decreased chloride at 93, decreased CO2 at 16, increased BUN at 68, increased creatinine to 3.7 and elevated anion gap at 22. Lactic acid was elevated at 3.6  Troponin on presentation was 47 and subsequently raised to 54. GI liver profile significant for decreased albumin at 2.3 elevated alk phos at 199, ALT elevated at 48, AST elevated at 113 and the patient's blood glucose level was 345    CBC significant for elevated WBC at 34.2, hemoglobin decreased at 9.3, and hematocrit 29.4    Blood cultures were drawn in ER    Urinalysis was positive for glucose with a moderate amount of blood and protein appreciated.   Leukocyte esterase was negative with many bacteria seen. Initial ABGs showed pH of 7.332, CO2 33.5 and bicarb 17.3    An EKG showed normal sinus rhythm, with possible left atrial enlargement, septal infarct age undetermined, possible lateral infarct age undetermined. A CT of the head was performed and showed no acute intercranial abnormality. 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 Chest x-ray showed no acute process and cardiomegaly      Subjective    Remains intubated, sedated and in 3 vasoactive agents. Code status has changed to [facility]. Family is at bedside, her niece and granddaughter. Family was updated on current clinical status and prognosis    Exam:  BP (!) 106/29   Pulse 58   Temp 97.7 °F (36.5 °C) (Esophageal)   Resp 14   Ht 5' 4\" (1.626 m)   Wt 264 lb (119.7 kg)   SpO2 100%   BMI 45.32 kg/m²   General appearance: Ill appearance  HEENT: Pupils equal, round, and reactive to light. Conjunctivae/corneas clear. Neck: Orally intubated  Respiratory: distant breath sounds  Cardiovascular: Regular rate and rhythm with normal S1/S2 without murmurs, rubs or gallops. Abdomen: Large postop area covered with bandages on the right   side musculoskeletal: No clubbing, cyanosis tr edema bilaterally.    Neurologic: Sedated    Medications:  Reviewed    Infusion Medications    sodium chloride      dextrose      sodium bicarbonate infusion 100 mL/hr at 07/25/22 0622    EPINEPHrine infusion 10 mcg/min (07/25/22 0622)    sodium chloride      vasopressin (Septic Shock) infusion 0.04 Units/min (07/25/22 0600)    fentaNYL 125 mcg/hr (07/25/22 0600)    dexmedetomidine (PRECEDEX) IV infusion Stopped (07/24/22 2101)    norepinephrine 15 mcg/min (07/25/22 0600)     Scheduled Medications    calcium gluconate IVPB  4,000 mg IntraVENous Once    insulin lispro  0-16 Units SubCUTAneous Q4H    sodium hypochlorite TROPONINI in the last 72 hours. Chronic labs:  Lab Results   Component Value Date    TSH 0.260 (L) 10/21/2019    INR 1.2 10/24/2019    LABA1C 9.9 (H) 07/21/2022       Microbiology:  Pending  No results for input(s): BC in the last 72 hours. No results for input(s): ORG in the last 72 hours. No results for input(s): [place] in the last 72 hours. No results for input(s): STREPNEUMAGU in the last 72 hours. No results for input(s): LP1UAG in the last 72 hours. No results for input(s): ASO in the last 72 hours. No results for input(s): CULTRESP in the last 72 hours. No results for input(s): PROCAL in the last 72 hours. Radiology:  CT ABDOMEN PELVIS WO CONTRAST Additional Contrast? 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 No drainable fluid collection. Clinical correlation recommended. No renal or ureteral stone. No hydronephrosis. Cholelithiasis without CT evidence of acute cholecystitis. Critical results were called by Dr. Gary Hopper MD to Dr. Tran Mathis on 7/20/2022 at 22:22. CT HEAD WO CONTRAST    Result Date: 7/20/2022  No acute intracranial abnormality. XR CHEST PORTABLE    Result Date: 7/23/2022  Interval worsening of the aeration of the mid and lower lung fields bilaterally. Lines and tubes are stable. Small left pleural effusion unchanged     XR CHEST PORTABLE    Result Date: 7/22/2022  Hazy left basilar opacities, concerning for pneumonia/aspiration. XR CHEST PORTABLE    Result Date: 7/21/2022  Endotracheal tube extends into the proximal right main bronchus. Recommend repositioning. Right upper extremity PICC, with tip projecting over the cavoatrial junction. The findings were sent to the Radiology Results [address] at 4:14 am on 7/21/2022 to be communicated to a licensed caregiver.      XR CHEST PORTABLE    Result Date: 7/20/2022  No acute process. Cardiomegaly. 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 *       PLAN:    1. Patient remains orally intubated  2. Pressor support and ventilator support per ICU team   3. Necrotizing fasciitis involving abdomen and perineum now status post OR  4. Critical care support including steroids and antibiotics  5. Prognosis guarded. Patient is now on 3 vasoactive agents and sedation, continue intubated, continue to deteriorate. Diet: Diet NPO Exceptions are: Sips of Water with Meds  Code Status: Limited  PT/OT Eval Status:   Unable yet  DVT Prophylaxis:   In place  Recommended disposition at discharge: To be determined    +++++++++++++++++++++++++++++++++++++++++++++++++  Lance Morales MD   [facility].  +++++++++++++++++++++++++++++++++++++++++++++++++  NOTE: This report was transcribed using voice recognition software. Every effort was made to ensure accuracy; however, inadvertent computerized transcription errors may be present.

## 2022-07-25 NOTE — DEATH NOTES
Death Pronouncement Note  Patient's Name: Fransisca Concepcion   Patient's YOB: 1950  MRN Number: [id number]    Admitting Provider: Daylin Murphy DO  Attending Provider: Addy Burleson MD    Patient was examined and the following were absent: Pulses, Blood Pressure, and Respiratory effort    I declared the patient dead on 7/25/2022 at 3:41 PM    Preliminary Cause of Death: Septic shock [facility])     Electronically signed by Jorge Alberto Steiner MD on 7/25/22 at 4:40 PM EDT

## 2022-07-25 NOTE — PLAN OF CARE
Problem: Respiratory - Adult  Goal: Achieves optimal ventilation and oxygenation  7/25/2022 0421 by Eduardo Ramires  Outcome: Progressing

## 2022-07-25 NOTE — PROGRESS NOTES
[facility]  SURGICAL INTENSIVE CARE UNIT (SICU)  ATTENDING PHYSICIAN CRITICAL CARE PROGRESS NOTE     I have examined the patient, reviewed the record, and discussed the case with the APN/ resident. Please refer to the APN/ resident's note. I agree with the assessment and plan. I have reviewed all relevant labs and imaging data. The following summarizes my clinical findings and independent assessment. CC:  critical care management after NSTI    Hospital Course/Overnight Events:  7/21: Admitted with Bakari's. Taken to OR for excisional debridement. On multiple pressors. Admitted to SICU post-op. Glucose remains uncontrolled, started on DKA protocol. 7/22: Several boluses yesterday for low UOP with appropriate response. No acute events overnight. Remains on bicarb drip, levo, and vaso. Glucose better controlled. 7/23: Patient's renal and kidney function worsening. Patient oliguric and extremely volume overloaded. Nephrology consulted, they recommend initiation of dialysis. Discussion with family regarding extremely poor prognosis, they may withdraw care. Palliative consulted. 7/24: Remains on 3 pressors. DKA resolved, now off insulin drip.  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 07/21/2022    Septic shock ([address] 07/21/2022    Elevated LFTs 07/21/2022    TOMÁS (acute kidney injury) ([address] 39/01/5497    Acute systolic CHF (congestive heart failure) ([address]     Dilated cardiomyopathy (HCC)     Acute and chronic respiratory failure with hypoxia ([facility] 10/20/2019    Bacterial pneumonia     Essential hypertension     Tachycardia     Acute respiratory failure with hypoxia ([facility] 09/19/2019    Chronic back pain 09/19/2019    Community acquired pneumonia     GI bleed 05/09/2016    Acute blood loss anemia 05/08/2016    Diabetes mellitus type 2, uncontrolled ([facility] 05/08/2016     Status post debridement necrotizing soft tissue infection--continue Zosyn/Vanco/clinda  Septic shock--on Levophed/vasopressin/epi drips--weaning as able  Adrenal insufficiency--continue stress dose steroids  Acute renal failure--poor urine output--monitor BUN/Cr/UO--continue bicarb drip; likely needs CVVHD  Acute respiratory failure--continue mechanical vent support; attempt spontaneous breathing trial (did not tolerate today)  Acute blood loss anemia--monitor H/H; transfuse today  Thrombocytopenia--likely consumptive--continue to monitor  Hypoalbuminemia/moderate protein calorie malnutrition--NPO for now; likely will need TPN  Elevated LFTs--monitor labs  Electrolyte imbalance (hyponatremia; hyperkalemia; hypocalcemia; hyperphosphatemia)--correct as able  Hyperglycemia--SSI  DVT risk--PCDs/subcu heparin    Patient is at risk for hemodynamic/metabolic/respiratory deterioration and death and requires ongoing ICU care    Arely Luna MD, FACS  7/25/2022  7:59 AM    Critical care time exclusive of teaching and procedures = 42 minutes     NOTE: This report was transcribed using voice recognition software. Every effort was made to ensure accuracy; however, inadvertent computerized transcription errors may be present.

## 2022-07-25 NOTE — FLOWSHEET NOTE
Patient asystole on monitor. Myself and another RN, Raul Garcia, verified implications of death including asystole on monitor, pupils nonreactive, no breath or cardiac sounds.

## 2022-07-25 NOTE — FLOWSHEET NOTE
Spoke with MILTON who states that the plan is still for withdraw this morning at noon. [place] denies administration of blood product this morning.

## 2022-07-25 NOTE — PROGRESS NOTES
Palliative Care Department  [phone]  Palliative Care Progress Note  Provider Sami Pires, APRN - CNP      PATIENT: Michael Salazar  : 1950  MRN: [id number]  ADMISSION DATE: 2022  3:13 PM  Referring Provider: Philip Morales MD    Palliative Medicine was consulted on hospital day 4 for assistance with Goals of care, Code Status Discussion    HPI:     Miguel Angel Lee is a 74 y.o. y/o female with a history of diabetes type 2, CHF, EF of 45 to 50%, fibromyalgia, glaucoma, hypertension who presented to [facility]) on 2022 from home with altered mental status. In the emergency room, laboratory findings shows lactic acid of 7.4, proBNP of 7528, cortisol level 33.69, BUN 61, creatinine 3.4, potassium 5.9. CT of the abdomen and pelvis shows soft tissues gas along the right perineum extending into the anterior lower abdominal wall, and right gluteal region concerning for Bakari's gangrene. Patient was also found to be hypotensive and bradycardic, Levophed and epinephrine were initiated. Patient required to be intubated, but patient's POA stated the patient does not want to be intubated. CODE STATUS was addressed at the moment, and she is a limited code. Neurosurgery was consulted, on 2022 patient had a excisional debridement of abdomen and perineum with findings of necrotizing fasciitis. Patient is currently intubated and sedated in ICU. 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   Capacity is time limited and situation/question specific  During encounter, Neema Carty, daughter, was surrogate medical decision-maker  Outcome of goals of care meeting:  Family still want to move forward with compassionate extubation today  Signed and held orders for medications placed    Code status Limited : okay for intubation and resuscitative medications only  Advanced Directives: no POA or living will in [facility]  Surrogate/Legal NOK:  [facility] [id number]  Meredith Pritchard (Granddaughter) [phone]    Spiritual assessment: no spiritual distress identified  Bereavement and grief: to be determined  Referrals to: none today    Thank you for the opportunity to participate in the care of Neelima Dunbar. RADHA Hunt CNP  Palliative Medicine     SUBJECTIVE:     Details of Conversation:     Met with patient's daughter [address], and multiple family members in the waiting room. [address] stated the family still want to move forward with compassionate extubation today, however she is requesting  more time as she still has family members [place] from the airport who want to see the patient before extubation. Comfort support was provided. Signed and held orders for compassionate extubation were placed. Bedside nurse notified to release order when family is ready. We will continue to follow    Prognosis: Guarded    OBJECTIVE:     BP (!) 112/32   Pulse 55   Temp 97.2 °F (36.2 °C)   Resp 14   Ht 5' 4\" (1.626 m)   Wt 264 lb (119.7 kg)   SpO2 100%   BMI 45.32 kg/m²     Physical Examination:  Gen: Elderly, intermittently opens eyes, ill-appearing  Lungs: Intubated  Heart: RRR  Abdomen: Soft, nondistended  Extremities: +edema  Skin: Warm, dry  Neuro: Intubated and sedated, responsive to voice    Objective data reviewed: labs, images, records, medication use, vitals, and chart    Time/Communication  Greater than 50% of time spent, total 35 minutes in counseling and coordination of care at the bedside regarding  CODE STATUS discussion and goals of care.     Thank you for allowing Palliative Medicine to participate in the care of Gail Rodriguez. Note: This report was completed using computerStylr voiced recognition software. Every effort has been made to ensure accuracy; however, inadvertent computerized transcription errors may be present.

## 2022-07-25 NOTE — PROGRESS NOTES
Diabetes education consulted for uncontrolled type 2 DM. Per palliative care note, \"at this time, Phoebe Alcantar, Does Not have capacity for medical decision-making. Family still wants to move forward with compassionate extubation today. \"  Please re-consult diabetes education if needed in the future.  Thank you. -Bo Crowe RN, BSN, Olga Banerjee

## 2022-07-25 NOTE — PROGRESS NOTES
Pharmacy Consultation Note  (Antibiotic Dosing and Monitoring)    Initial consult date: 7/21/22  Consulting physician/provider: Dr Anthony Stinson  Drug: Vancomycin  Indication: NSTI    Age/  Gender Height Weight IBW  Allergy Information   72 y.o./female 5' 4\" (162.6 cm) 208 lb (94.3 kg)     Ideal body weight: 54.7 kg (120 lb 9.5 oz)  Adjusted ideal body weight: 80.7 kg (177 lb 15.3 oz)   Patient has no known allergies. Renal Function:  Recent Labs     07/24/22  1631 07/24/22  2300 07/25/22  0500   BUN 56* 58* 61*   CREATININE 3.3* 3.4* 3.5*         Intake/Output Summary (Last 24 hours) at 7/25/2022 1134  Last data filed at 7/25/2022 0900  Gross per 24 hour   Intake 4875.04 ml   Output 556 ml   Net 4319.04 ml         Vancomycin Monitoring:  Trough:  No results for input(s): VANCOTROUGH in the last 72 hours. Random:    Recent Labs     07/23/22  0605 07/24/22  0410 07/25/22  0500   VANCORANDOM 21.6 29.5 21.3         Vancomycin Administration Times:  Recent vancomycin administrations                     vancomycin (VANCOCIN) 1,000 mg in dextrose 5 % 250 mL IVPB ([id number]) (mg) 1,000 mg New Bag 07/23/22 2122                    Assessment:  Patient is a 74 y.o. female who has been initiated on vancomycin  Estimated Creatinine Clearance: 18 mL/min (A) (based on SCr of 3.5 mg/dL (H)). To dose vancomycin, pharmacy will be utilizing dosing based off of levels because of patient's renal impairment/insufficiency  Vancomycin random 21.3 mcg/mL from 0500    Plan:   Will hold additional vancomycin today  Will check vancomycin random level with AM labs tomorrow  Will continue to monitor renal function   Clinical pharmacy to follow    Elva Brand PharmD, BCPS 7/25/2022 11:35 AM    Addendum:     Vancomycin has been discontinued   Clinical Pharmacy to sign-off  Physician to re-consult pharmacy if future dosing is needed    Thank you for the consult,    Elva Brand PharmD, BCPS 7/25/2022 2:55 PM

## 2022-07-25 NOTE — PROGRESS NOTES
Surgical Intensive Care Unit  Daily Progress Note  Date of admission:  7/20/2022  Reason for ICU transfer:  Bakari's gangrene    Subjective:  No acute events overnight. Received several boluses for low UOP with appropriate response. No pain this morning, is able to follow commands. Hospital Course:  7/21: Admitted with Bakari's. Taken to OR for excisional debridement. On multiple pressors. Admitted to SICU post-op. Glucose remains uncontrolled, started on DKA protocol. 7/22: Several boluses yesterday for low UOP with appropriate response. No acute events overnight. Remains on bicarb drip, levo, and vaso. Glucose better controlled. 7/23: Patient's renal and kidney function worsening. Patient oliguric and extremely volume overloaded. Nephrology consulted, they recommend initiation of dialysis. Discussion with family regarding extremely poor prognosis, they may withdraw care. Palliative consulted. 7/24: Remains on 3 pressors. DKA resolved, now off insulin drip. Family still discussing further goals of care. 7/25: No acute events overnight. Weaning off levo. UOP remains extremely poor. Now minimally responsive. Physical Exam:  /67   Pulse 62   Temp 97.9 °F (36.6 °C) (Esophageal)   Resp 15   Ht 5' 4\" (1.626 m)   Wt 258 lb 11.2 oz (117.3 kg)   SpO2 100%   BMI 44.41 kg/m²     CONSTITUTIONAL:  Intubated, minimally responsive, critically ill  EYES:  Lids and lashes normal, pupils equal, round and reactive to light, extra ocular muscles intact, sclera clear, conjunctiva normal  NECK:  supple, symmetrical, trachea midline  LUNGS:  On mechanical ventilation  CARDIOVASCULAR:  RR and normotensive on multiple pressors  ABDOMEN:  Soft, non-tender, non-distended. No rebound or guarding. SKIN:  Large wound to right perineum and labia with heavy drainage pack, foul smelling but no sushma purulence    ASSESSMENT / PLAN:  Neuro: Acute pain secondary to surgery, pain control PRN. Precedex and fentanyl. CV: Septic shock, continue IVFs and pressors, wean as able for goal MAP > 65. Elevated BNP secondary to CHF, hold home meds for now. Elevated troponin, likely demand ischemia, monitor. Hx HLD, continue Crestor. Pulm: Acute hypoxemic respiratory failure, on mechanical ventilation, wean as able. GI: Elevated LFTs likely secondary to hypotension, relatively stable. Monitor. Renal: Acute renal failure, urine lytes pre-renal, continue IVFs, monitor UOP and Cr. Cr now trending down, continue with prn boluses. Hyponatremia worsening  ID:       Bakari's s/p excisional debridement, continue Vanc/Zosyn and Clinda. WBC trending down. Endo: DKA, now resolved. Off insulin drip. Continue with SSI.   MSK: Large perineal wound now s/p debridement 7/21, 7/22, continue local wound care. Further takeback timing TBD. Bowel regimen: Glycolax   Pain control/Sedation: Tylenol, Precedex, Fentanyl gtt   DVT prophylaxis: SCDs, SQH  GI: Pepcid   Glucose protocol: Insulin gtt. Transition to basal + SSI if able today. Mouth/Eye care: As needed  [place]: Keep in place for critical care monitoring of fluid balance.   CVC sites: R subclavian TLC Day #4, R radial A-line Day #4  Ancillary consults: IM  Family Update: As available     Code status:   Limited    Electronically signed by Vern Bunn MD on 7/21/22 at 5:50 AM EDT

## 2022-07-25 NOTE — DISCHARGE SUMMARY
Hospitalist Discharge Summary    Patient ID: [id number]   Patient : [id number]  Patient's PCP: Kay Myers DO    Admit Date: 2022   Admitting Physician: Beatrice Whitley DO    Discharge Date:  2022   Discharge Physician: Omar Saenz MD   Discharge Condition:  Expires  Discharge Disposition: [facility], [place], operated by [facility] course in brief:  (Please refer to daily progress notes for a comprehensive review of the hospitalization by requesting medical records)  Patient admitted for blood sugar of 305, increased abdominal pain and \"lethargy\" that occurred suddenly yesterday afternoon. Patient states that she has been experiencing low back/hip pain for the last month that is moderate in severity, worsened and relieved by nothing. Additionally, patient complains of abdominal pain that has been worsening over the last week. She was scheduled to go to her PCP on day of complaint but was too weak to leave her home. Patient denies chest pain, shortness of breath, fever, aches, chills. Patient was hypotensive upon arrival to the ED and was started on Levophed and epinephrine. Patient does have history of CHF with EF of 45 to 50%, diverticulitis and diabetes. 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 Surgery was consulted and patient was taken to surgery for excisional debridement of perineum, right buttocks, mons pubis, and possible lower abdominal wall. Patient was admitted to medicine for further observation and management of sepsis. Course significant for BMP with an decreased sodium of 131, decreased chloride at 93, decreased CO2 at 16, increased BUN at 68, increased creatinine to 3.7 and elevated anion gap at 22. Lactic acid was elevated at 3.6. Troponin on presentation was 47 and subsequently raised to 54.   GI liver profile significant for decreased albumin at 2.3 elevated alk phos at 199, ALT elevated at 48, AST elevated at 113 and the patient's blood glucose level was 345. CBC significant for elevated WBC at 34.2, hemoglobin decreased at 9.3, and hematocrit 29.4. Blood cultures were drawn in ER. Urinalysis was positive for glucose with a moderate amount of blood and protein appreciated. Leukocyte esterase was negative with many bacteria seen. Initial ABGs showed pH of 7.332, CO2 33.5 and bicarb 17.3 A CT of the head was performed and showed no acute intercranial abnormality. 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 Chest x-ray showed no acute process and cardiomegaly. Patient was admitted to ICU for further management of septic shock. Her clinical course was complicated and further deteriorated. Patient remained intubated, sedated and on 3 pressors. Finally decided for withdrawing of care. Patient's past 20 few minutes later on 7/25/2022 at 3:41 PM.  Ended pronouncement and note created by direct provider. [facility] that is for septic shock  +++++++++++++++++++++++++++++++++++++++++++++++++  Miladis Samuel MD  [facility] Physician - [address] [place] [address]  +++++++++++++++++++++++++++++++++++++++++++++++++  NOTE: This report was transcribed using voice recognition software. Every effort was made to ensure accuracy; however, inadvertent computerized transcription errors may be present.

## 2022-07-25 NOTE — FLOWSHEET NOTE
See MAR for medication administration. Palliative medicine orders followed.  Respiratory notified of extubate at [phone]

## 2022-07-25 NOTE — PROGRESS NOTES
[facility]  Nephrology Attending Progress Note          SUBJECTIVE:     7/23: the pt is a a 73 yo female with a pmh of HFrEF, dm, htn, glaucoma, fibromyalgia, neuropathy who was admitted 7/20/22 with hyperglycemia and lethargy. history is obtained from the chart and the family. family says she recently was on abc for a uti and chart says she had abd pain for a week prior to admission. 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 She was started on 3 pressors, intubated, and is on clinda, vanco, and zosyn. Her cr on admission was 3.3>3.7>2.9. baseline cr from 6/8/21 was 1.6 and was 1.1-1.3 in months prior. She is  on a bicarb drip and is 20 L positive. Uo is oliguric and she is in dka. Outpt entresto and aldactone are on hold.      7/24: pt seen in icu, intubated on 3 pressors, oliguric  7/25: remains intubated, no other acute changes overnight; informed family considering withdrawal of support      PROBLEM LIST:    Patient Active Problem List   Diagnosis    Acute blood loss anemia    Diabetes mellitus type 2, uncontrolled (HCC)    GI bleed    Acute respiratory failure with hypoxia (HCC)    Chronic back pain    Community acquired pneumonia    Bacterial pneumonia    Essential hypertension    Tachycardia    Acute and chronic respiratory failure with hypoxia (HCC)    Dilated cardiomyopathy (HCC)    Acute systolic CHF (congestive heart failure) (HCC)    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    Septic shock (HCC)    Elevated LFTs    TOMÁS (acute kidney injury) ([facility] [address]        PAST MEDICAL HISTORY:    Past Medical History:   Diagnosis Date    Asthma     CHF (congestive heart failure) (HCC)     Diabetes mellitus ([address]     Diverticular disease     Fibromyalgia muscle pain     Glaucoma     Hypertension     Neuropathy        DIET:    Diet NPO Exceptions are: Sips of Water with Meds     PHYSICAL EXAM:     Patient Vitals for the past 24 hrs:   BP Temp Temp src Pulse Resp SpO2 Weight   07/24/22 0900 (!) 118/53 97.7 °F (36.5 °C) Esophageal 62 16 100 % --   07/24/22 0819 -- -- -- 67 21 100 % --   07/24/22 0700 -- -- -- 71 28 96 % --   07/24/22 0600 (!) 124/51 97.5 °F (36.4 °C) Esophageal 71 18 100 % 258 lb 11.2 oz (117.3 kg)   07/24/22 0500 -- -- -- 69 24 99 % --   07/24/22 0400 96/76 98.4 °F (36.9 °C) Esophageal 69 19 100 % --   07/24/22 0310 -- -- -- 70 19 100 % --   07/24/22 0300 (!) 113/47 -- -- 69 22 100 % --   07/24/22 0200 (!) 131/42 97.7 °F (36.5 °C) Esophageal 70 21 100 % --   07/24/22 0100 107/60 -- -- 68 22 100 % --   07/24/22 0030 (!) 113/50 97.2 °F (36.2 °C) Esophageal 67 19 100 % --   07/24/22 0015 (!) 109/46 97.2 °F (36.2 °C) Esophageal 68 15 100 % --   07/24/22 0000 (!) 119/52 (!) 96.4 °F (35.8 °C) Esophageal 68 18 100 % --   07/23/22 2331 -- -- -- 66 18 100 % --   07/23/22 2300 (!) 105/54 -- -- 66 15 100 % --   07/23/22 2200 (!) 112/54 (!) 95.9 °F (35.5 °C) Esophageal 65 14 100 % --   07/23/22 2100 (!) 109/52 -- -- 65 16 91 % --   07/23/22 2000 (!) 100/52 96.8 °F (36 °C) Esophageal 63 14 100 % --   07/23/22 1943 -- -- -- 62 14 100 % --   07/23/22 1938 (!) 95/41 -- -- 63 13 100 % --   07/23/22 1928 (!) 85/45 -- -- 63 13 100 % --   07/23/22 1920 (!) 88/38 -- -- 63 16 100 % --   07/23/22 1915 (!) 103/42 -- -- 62 13 100 % --   07/23/22 1908 (!) 95/41 -- -- 62 14 100 % --   07/23/22 1903 (!) 84/40 -- -- 62 14 100 % --   07/23/22 1900 (!) 91/55 (!) 96.6 °F (35.9 °C) Esophageal 63 15 100 % --   07/23/22 1845 (!) 94/31 -- -- 62 14 100 % --   07/23/22 1800 (!) 119/34 -- -- 61 14 100 % --   07/23/22 1738 (!) 114/56 -- -- 60 15 97 % --   07/23/22 1729 (!) 92/58 -- -- 65 14 100 % --   07/23/22 1713 97/61 -- -- 62 13 97 % --   07/23/22 1700 (!) 83/46 96.8 °F (36 °C) Esophageal 61 14 95 % --   07/23/22 1655 -- -- -- 67 15 98 % --   07/23/22 1648 (!) 98/37 -- -- 63 14 100 % --   07/23/22 1637 85/61 -- -- 64 14 100 % --   07/23/22 1622 (!) 71/60 -- -- 59 15 100 % --   07/23/22 1615 (!) 129/34 97 °F (36.1 °C) -- 57 14 100 % --   07/23/22 1605 (!) 129/34 97 °F (36.1 °C) -- 58 16 98 % --   07/23/22 1600 (!) 129/34 97 °F (36.1 °C) Esophageal 60 15 99 % --   07/23/22 1500 (!) 110/32 -- -- 56 17 100 % --   07/23/22 1400 (!) 146/43 (!) 96.6 °F (35.9 °C) Esophageal 55 14 100 % --   07/23/22 1300 (!) 124/56 (!) 96.6 °F (35.9 °C) Esophageal 55 15 100 % --   07/23/22 1245 -- -- -- 55 15 100 % --   07/23/22 1200 (!) 124/50 (!) 96.6 °F (35.9 °C) Esophageal 56 16 100 % --   07/23/22 1100 -- (!) 96.4 °F (35.8 °C) Esophageal 57 14 -- --   07/23/22 1000 -- 96.9 °F (36.1 °C) Axillary 58 14 100 % --   @      Intake/Output Summary (Last 24 hours) at 7/24/2022 0942  Last data filed at 7/24/2022 0853  Gross per 24 hour   Intake 9579.57 ml   Output 71 ml   Net 9508.57 ml         Wt Readings from Last 3 Encounters:   07/24/22 258 lb 11.2 oz (117.3 kg)   04/27/22 208 lb 8 oz (94.6 kg)   03/17/22 187 lb (84.8 kg)       Constitutional:  Pt is intubated  Head: normocephalic, atraumatic  Neck: no JVD  Cardiovascular: regular rate and rhythm, no murmurs, gallops, or rubs  Respiratory:  No rales, rhochi, or wheezes  Gastrointestinal:  Soft, nontender, nondistended, bowel sounds x 4  Ext: edema  Skin: dry, no rash  Neuro: sedated    MEDS (scheduled):    calcium gluconate IVPB  4,000 mg IntraVENous Once    insulin lispro  0-16 Units SubCUTAneous Q4H    sodium hypochlorite   Irrigation Daily    hydrocortisone sodium succinate PF  100 mg IntraVENous Q8H    sodium chloride flush  5-40 mL IntraVENous 2 times per day    heparin (porcine)  5,000 Units SubCUTAneous 3 times per day    polyethylene glycol  17 g Oral Daily    FLUoxetine  20 mg Oral Daily    chlorhexidine  15 mL Mouth/Throat BID    polyvinyl alcohol  1 drop Both Eyes Q4H    And    artificial tears   Both Eyes Q4H    clindamycin (CLEOCIN) IV  900 mg IntraVENous Q8H    piperacillin-tazobactam  4,500 mg IntraVENous Q12H    vancomycin (VANCOCIN) intermittent dosing (placeholder)   Other RX Placeholder    pantoprazole (PROTONIX) 40 mg injection  40 mg IntraVENous Daily    rosuvastatin  10 mg Oral Daily       MEDS (infusions):   dextrose      sodium bicarbonate infusion      EPINEPHrine infusion 12 mcg/min (07/24/22 0709)    sodium chloride      sodium chloride      sodium chloride      vasopressin (Septic Shock) infusion 0.04 Units/min (07/24/22 0600)    fentaNYL 100 mcg/hr (07/24/22 0600)    dexmedetomidine (PRECEDEX) IV infusion 0.3 mcg/kg/hr (07/23/22 1343)    norepinephrine 45 mcg/min (07/24/22 0707)       MEDS (prn):  glucose, dextrose bolus **OR** dextrose bolus, glucagon (rDNA), dextrose, sodium chloride, acetaminophen, sodium chloride, sodium chloride flush, sodium chloride, ondansetron **OR** ondansetron, potassium chloride, magnesium sulfate    DATA:    Recent Labs     07/23/22  0605 07/23/22  1550 07/23/22  2305 07/24/22  0410   WBC 23.9* 26.0*  --  28.2*   HGB 9.6* 8.2* 6.8* 8.9*   HCT 27.9* 23.3* 19.6* 25.0*   MCV 79.7* 79.5*  --  79.1*   * 106*  --  102*     Recent Labs     07/22/22  0600 07/22/22  1000 07/23/22  0605 07/23/22  1033 07/23/22  1825 07/23/22  2305 07/24/22  0410   *   < > 125*   < > 123* 123* 122*  121*   K 4.9  4.9   < > 5.0  5.0   < > 4.8 4.5 4.7  4.7   CL 92*   < > 93*   < > 92* 91* 90*  90*   CO2 17*   < > 16*   < > 20* 19* 20*  19*   BUN 64*   < > 57*   < > 56* 54* 54*  55*   CREATININE 3.3*   < > 2.9*   < > 3.0* 3.1* 3.1*  3.2*   LABGLOM 17   < > 19   < > 18 18 18  17   GLUCOSE 295*   < > 281*   < > 244* 185* 97  97   CALCIUM 6.7*   < > 6.3*   < > 6.4* 6.6* 6.7*  6.7*   ALT 52*  --  43*  --   --   --  39*   *  --  115*  --   --   --  123*   BILITOT 1.0  --  1.2  --   --   --  1.1   ALKPHOS 144*  --  429*  --   --   --  491*   MG 1.9   < > 1.9   < > 1.9 2.0 2.0   PHOS 3.8   < > 4.2   < > 4.9* 4.5 4.6*    < > = values in this interval not displayed.        Lab Results   Component Value Date    LABALBU 1.1 (L) 07/24/2022    LABALBU 1.4 (L) 07/23/2022    LABALBU 1.2 (L) 07/22/2022     Lab Results   Component Value Date    TSH 0.260 (L) 10/21/2019       Iron Studies  No results found for: IRON, TIBC, FERRITIN  No results found for: RSQSONXF25  No results found for: FOLATE    No results found for: VITD25  No results found for: PTH    No components found for: URIC    Lab Results   Component Value Date/Time    COLORU Yellow 07/20/2022 04:13 PM    NITRU Negative 07/20/2022 04:13 PM    GLUCOSEU 250 07/20/2022 04:13 PM    KETUA Negative 07/20/2022 04:13 PM    UROBILINOGEN 1.0 07/20/2022 04:13 PM    BILIRUBINUR Negative 07/20/2022 04:13 PM       No results found for: QYJFHDID      IMPRESSION/RECOMMENDATIONS:      Arf on ckd 3a  Baseline 1.1-1.6  In setting of septic shock on 3 pressors, outpt entresto  Decreased renal perfusion  Fena <1  Ct without no hydro  Holding entresto  Continue hemodynamic support  Oliguric  Discussed dialysis with family and they are deciding  She wouldt want long term hd  Hd may be temporary  Cr 3.7>3.1:2.9>3.2     2. Metabolic acidosis  Due to arf/sepsis  Lactic 2.1  On hco3 drip     3. Septic shock  Fourniers gangrene  Wbc 23>28  Sp I and d 7/21 and 7/22  Abx vanco zosyn clinda  Per gen surg     4. Dka  Per icu protocol     5. Hyonatremia  In part due to hyperglycemia  And hypotonic fluids  Decreasing sodium level  Will give course of hypertonic saline  Check tsh cortisol       6. Vol overload  30L positive  Oliguric  On 3 pressors  Likley wont tolerate lasix drip  Family considering hd,  need cvvh  Follow uo  Hi input from drips     6. Hypocalemia  Replace prn  Alb 1.4>1. 1  Ionized low  Check pth vit d    Family deciding  on comfort care; further discussion later today    D/W Dr Kasey Landry MD

## 2022-07-26 LAB
CULTURE SURGICAL: ABNORMAL
ORGANISM: ABNORMAL

## 2022-07-27 LAB
BLOOD BANK DISPENSE STATUS: NORMAL
BLOOD BANK PRODUCT CODE: NORMAL
BPU ID: NORMAL
DESCRIPTION BLOOD BANK: NORMAL

## (undated) DEVICE — GLOVE SURG SZ 8 L12IN FNGR THK79MIL GRN LTX FREE

## (undated) DEVICE — SWAB SPEC COLL SHFT L5.25IN POLYUR FOAM TIP SFT DBL MEDIA

## (undated) DEVICE — READY WET SKIN SCRUB TRAY-LF: Brand: MEDLINE INDUSTRIES, INC.

## (undated) DEVICE — ELECTRODE PT RET AD L9FT HI MOIST COND ADH HYDRGEL CORDED

## (undated) DEVICE — MEDIBRIEF MESH BRIEF - SIZE X-LARGE - SIZE COLOR CODE: GREEN - 2EA/BG, 50BG/CS: Brand: MEDIBRIEF

## (undated) DEVICE — CLEANER,CAUTERY TIP,2X2",STERILE: Brand: MEDLINE

## (undated) DEVICE — INTENDED FOR TISSUE SEPARATION, AND OTHER PROCEDURES THAT REQUIRE A SHARP SURGICAL BLADE TO PUNCTURE OR CUT.: Brand: BARD-PARKER ® STAINLESS STEEL BLADES

## (undated) DEVICE — GLOVE SURG SZ 75 L12IN FNGR THK94MIL TRNSLUC YEL LTX

## (undated) DEVICE — UNIVERSAL DRAPE: Brand: MEDLINE INDUSTRIES, INC.

## (undated) DEVICE — GLOVE ORANGE PI 7 1/2   MSG9075

## (undated) DEVICE — TOWEL,OR,DSP,ST,BLUE,DLX,10/PK,8PK/CS: Brand: MEDLINE

## (undated) DEVICE — SPONGE LAP W18XL18IN WHT COT 4 PLY FLD STRUNG RADPQ DISP ST

## (undated) DEVICE — CONTAINER VACUTAINER ANAER CULTURE SWAB

## (undated) DEVICE — SOLUTION IV IRRIG POUR BRL 0.9% SODIUM CHL 2F7124

## (undated) DEVICE — DRAPE,UNDERBUTTOCKS,STERILE: Brand: MEDLINE

## (undated) DEVICE — YANKAUER,POOLE TIP,STERILE,50/CS: Brand: MEDLINE

## (undated) DEVICE — GLOVE ORANGE PI 7   MSG9070

## (undated) DEVICE — APPLICATOR PREP 26ML 0.7% IOD POVACRYLEX 74% ISO ALC ST

## (undated) DEVICE — DRAPE LITHOTOMY W/POUCH/LEGGINGS

## (undated) DEVICE — E-Z CLEAN, NON-STICK, PTFE COATED, ELECTROSURGICAL BLADE ELECTRODE, 6.5 INCH (16.5 CM): Brand: MEGADYNE

## (undated) DEVICE — GLOVE SURG SZ 65 THK91MIL LTX FREE SYN POLYISOPRENE

## (undated) DEVICE — SOLUTION IV IRRIG WATER 1000ML POUR BRL 2F7114

## (undated) DEVICE — PACK,HEAVY DRAINAGE,STERILE: Brand: MEDLINE INDUSTRIES, INC.

## (undated) DEVICE — BANDAGE,GAUZE,4.5"X4.1YD,STERILE,LF: Brand: MEDLINE

## (undated) DEVICE — TOWEL,OR,DSP,ST,BLUE,STD,6/PK,12PK/CS: Brand: MEDLINE